# Patient Record
Sex: FEMALE | Race: WHITE | Employment: OTHER | URBAN - METROPOLITAN AREA
[De-identification: names, ages, dates, MRNs, and addresses within clinical notes are randomized per-mention and may not be internally consistent; named-entity substitution may affect disease eponyms.]

---

## 2017-01-03 ENCOUNTER — OFFICE VISIT (OUTPATIENT)
Dept: PULMONOLOGY | Age: 52
End: 2017-01-03

## 2017-01-03 VITALS
BODY MASS INDEX: 24.13 KG/M2 | DIASTOLIC BLOOD PRESSURE: 71 MMHG | HEART RATE: 86 BPM | WEIGHT: 145 LBS | SYSTOLIC BLOOD PRESSURE: 106 MMHG

## 2017-01-03 DIAGNOSIS — J38.3 VOCAL CORD DYSFUNCTION: ICD-10-CM

## 2017-01-03 DIAGNOSIS — R05.3 CHRONIC COUGH: Primary | ICD-10-CM

## 2017-01-03 DIAGNOSIS — J45.40 MODERATE PERSISTENT ASTHMA WITHOUT COMPLICATION: ICD-10-CM

## 2017-01-03 DIAGNOSIS — J18.9 CAP (COMMUNITY ACQUIRED PNEUMONIA): ICD-10-CM

## 2017-01-03 DIAGNOSIS — J45.909 RAD (REACTIVE AIRWAY DISEASE), UNSPECIFIED ASTHMA SEVERITY, UNCOMPLICATED: ICD-10-CM

## 2017-01-03 PROCEDURE — 99214 OFFICE O/P EST MOD 30 MIN: CPT | Performed by: INTERNAL MEDICINE

## 2017-01-05 RX ORDER — IPRATROPIUM BROMIDE AND ALBUTEROL SULFATE 2.5; .5 MG/3ML; MG/3ML
SOLUTION RESPIRATORY (INHALATION)
Qty: 90 ML | Refills: 1 | OUTPATIENT
Start: 2017-01-05

## 2017-01-17 DIAGNOSIS — J45.909 RAD (REACTIVE AIRWAY DISEASE), UNSPECIFIED ASTHMA SEVERITY, UNCOMPLICATED: ICD-10-CM

## 2017-01-17 RX ORDER — LEVALBUTEROL INHALATION SOLUTION 1.25 MG/3ML
SOLUTION RESPIRATORY (INHALATION)
Qty: 288 ML | Refills: 0 | OUTPATIENT
Start: 2017-01-17

## 2017-01-17 RX ORDER — IPRATROPIUM BROMIDE AND ALBUTEROL SULFATE 2.5; .5 MG/3ML; MG/3ML
1 SOLUTION RESPIRATORY (INHALATION) EVERY 6 HOURS PRN
Qty: 360 ML | Refills: 11 | Status: SHIPPED | OUTPATIENT
Start: 2017-01-17 | End: 2018-02-21 | Stop reason: SDUPTHER

## 2017-01-18 ENCOUNTER — TELEPHONE (OUTPATIENT)
Dept: PULMONOLOGY | Age: 52
End: 2017-01-18

## 2017-02-01 ENCOUNTER — TELEPHONE (OUTPATIENT)
Dept: PULMONOLOGY | Age: 52
End: 2017-02-01

## 2017-02-13 ENCOUNTER — TELEPHONE (OUTPATIENT)
Dept: PULMONOLOGY | Age: 52
End: 2017-02-13

## 2017-03-23 ENCOUNTER — HOSPITAL ENCOUNTER (OUTPATIENT)
Dept: OTHER | Age: 52
Discharge: HOME | End: 2017-03-23
Attending: NURSE PRACTITIONER | Admitting: NURSE PRACTITIONER

## 2017-03-23 ENCOUNTER — OFFICE VISIT (OUTPATIENT)
Dept: PULMONOLOGY | Age: 52
End: 2017-03-23

## 2017-03-23 VITALS
RESPIRATION RATE: 20 BRPM | SYSTOLIC BLOOD PRESSURE: 123 MMHG | DIASTOLIC BLOOD PRESSURE: 80 MMHG | WEIGHT: 148.8 LBS | BODY MASS INDEX: 24.76 KG/M2 | TEMPERATURE: 98.2 F | OXYGEN SATURATION: 97 % | HEART RATE: 67 BPM

## 2017-03-23 DIAGNOSIS — J18.9 CAP (COMMUNITY ACQUIRED PNEUMONIA): ICD-10-CM

## 2017-03-23 DIAGNOSIS — R05.3 CHRONIC COUGH: ICD-10-CM

## 2017-03-23 DIAGNOSIS — J45.40 MODERATE PERSISTENT ASTHMA WITHOUT COMPLICATION: ICD-10-CM

## 2017-03-23 DIAGNOSIS — J38.3 VOCAL CORD DYSFUNCTION: ICD-10-CM

## 2017-03-23 DIAGNOSIS — R05.3 CHRONIC COUGH: Primary | ICD-10-CM

## 2017-03-23 LAB
ANION GAP SERPL CALCULATED.3IONS-SCNC: 14 MMOL/L (ref 3–16)
BASOPHILS ABSOLUTE: 0.1 K/UL (ref 0–0.2)
BASOPHILS RELATIVE PERCENT: 0.8 %
BUN BLDV-MCNC: 12 MG/DL (ref 7–20)
CALCIUM SERPL-MCNC: 9.2 MG/DL (ref 8.3–10.6)
CHLORIDE BLD-SCNC: 102 MMOL/L (ref 99–110)
CO2: 24 MMOL/L (ref 21–32)
CREAT SERPL-MCNC: 0.6 MG/DL (ref 0.6–1.1)
EOSINOPHILS ABSOLUTE: 0.5 K/UL (ref 0–0.6)
EOSINOPHILS RELATIVE PERCENT: 6.1 %
GFR AFRICAN AMERICAN: >60
GFR NON-AFRICAN AMERICAN: >60
GLUCOSE BLD-MCNC: 96 MG/DL (ref 70–99)
HCT VFR BLD CALC: 45.7 % (ref 36–48)
HEMOGLOBIN: 15.2 G/DL (ref 12–16)
LYMPHOCYTES ABSOLUTE: 1.9 K/UL (ref 1–5.1)
LYMPHOCYTES RELATIVE PERCENT: 25.7 %
MCH RBC QN AUTO: 29.8 PG (ref 26–34)
MCHC RBC AUTO-ENTMCNC: 33.2 G/DL (ref 31–36)
MCV RBC AUTO: 89.6 FL (ref 80–100)
MONOCYTES ABSOLUTE: 0.5 K/UL (ref 0–1.3)
MONOCYTES RELATIVE PERCENT: 6.8 %
NEUTROPHILS ABSOLUTE: 4.6 K/UL (ref 1.7–7.7)
NEUTROPHILS RELATIVE PERCENT: 60.6 %
PDW BLD-RTO: 14.2 % (ref 12.4–15.4)
PLATELET # BLD: 184 K/UL (ref 135–450)
PMV BLD AUTO: 9.6 FL (ref 5–10.5)
POTASSIUM SERPL-SCNC: 4.3 MMOL/L (ref 3.5–5.1)
RBC # BLD: 5.1 M/UL (ref 4–5.2)
SODIUM BLD-SCNC: 140 MMOL/L (ref 136–145)
WBC # BLD: 7.6 K/UL (ref 4–11)

## 2017-03-23 PROCEDURE — 99214 OFFICE O/P EST MOD 30 MIN: CPT | Performed by: NURSE PRACTITIONER

## 2017-03-23 RX ORDER — DOXYCYCLINE HYCLATE 100 MG
100 TABLET ORAL 2 TIMES DAILY
Qty: 20 TABLET | Refills: 0 | Status: SHIPPED | OUTPATIENT
Start: 2017-03-23 | End: 2017-04-02

## 2017-06-08 ENCOUNTER — TELEPHONE (OUTPATIENT)
Dept: PULMONOLOGY | Age: 52
End: 2017-06-08

## 2017-06-08 ENCOUNTER — OFFICE VISIT (OUTPATIENT)
Dept: PULMONOLOGY | Age: 52
End: 2017-06-08

## 2017-06-08 VITALS
TEMPERATURE: 99 F | DIASTOLIC BLOOD PRESSURE: 85 MMHG | SYSTOLIC BLOOD PRESSURE: 135 MMHG | WEIGHT: 148.4 LBS | BODY MASS INDEX: 24.7 KG/M2 | HEART RATE: 110 BPM | OXYGEN SATURATION: 93 % | RESPIRATION RATE: 22 BRPM

## 2017-06-08 DIAGNOSIS — J44.1 ACUTE EXACERBATION OF COPD WITH ASTHMA (HCC): ICD-10-CM

## 2017-06-08 DIAGNOSIS — J45.901 ACUTE EXACERBATION OF COPD WITH ASTHMA (HCC): ICD-10-CM

## 2017-06-08 DIAGNOSIS — J96.01 ACUTE RESPIRATORY FAILURE WITH HYPOXIA (HCC): Primary | ICD-10-CM

## 2017-06-08 PROCEDURE — 99214 OFFICE O/P EST MOD 30 MIN: CPT | Performed by: NURSE PRACTITIONER

## 2017-06-08 RX ORDER — DOXYCYCLINE HYCLATE 100 MG/1
100 CAPSULE ORAL 2 TIMES DAILY
Qty: 20 CAPSULE | Refills: 0 | Status: SHIPPED | OUTPATIENT
Start: 2017-06-08 | End: 2017-06-18

## 2017-08-16 ENCOUNTER — TELEPHONE (OUTPATIENT)
Dept: PULMONOLOGY | Age: 52
End: 2017-08-16

## 2017-10-24 RX ORDER — ALBUTEROL SULFATE 90 MCG
HFA AEROSOL WITH ADAPTER (GRAM) INHALATION
Qty: 13.4 INHALER | Refills: 3 | OUTPATIENT
Start: 2017-10-24

## 2018-02-21 DIAGNOSIS — J45.909 RAD (REACTIVE AIRWAY DISEASE), UNSPECIFIED ASTHMA SEVERITY, UNCOMPLICATED: ICD-10-CM

## 2018-02-22 RX ORDER — IPRATROPIUM BROMIDE AND ALBUTEROL SULFATE 2.5; .5 MG/3ML; MG/3ML
SOLUTION RESPIRATORY (INHALATION)
Qty: 360 ML | Refills: 0 | Status: SHIPPED | OUTPATIENT
Start: 2018-02-22 | End: 2018-02-26 | Stop reason: ALTCHOICE

## 2018-02-23 ENCOUNTER — TELEPHONE (OUTPATIENT)
Dept: PULMONOLOGY | Age: 53
End: 2018-02-23

## 2018-02-23 NOTE — TELEPHONE ENCOUNTER
TIMUR for pt can see her Monday at 1:15 and that I went ahead and scheduled her. Asked for her to call us back on Monday morning if that is not going to work otherwise she is all set.

## 2018-02-26 ENCOUNTER — OFFICE VISIT (OUTPATIENT)
Dept: PULMONOLOGY | Age: 53
End: 2018-02-26

## 2018-02-26 VITALS
DIASTOLIC BLOOD PRESSURE: 72 MMHG | SYSTOLIC BLOOD PRESSURE: 116 MMHG | HEART RATE: 68 BPM | WEIGHT: 153 LBS | BODY MASS INDEX: 25.46 KG/M2

## 2018-02-26 DIAGNOSIS — J45.909 RAD (REACTIVE AIRWAY DISEASE), UNSPECIFIED ASTHMA SEVERITY, UNCOMPLICATED: ICD-10-CM

## 2018-02-26 PROCEDURE — 99213 OFFICE O/P EST LOW 20 MIN: CPT | Performed by: INTERNAL MEDICINE

## 2018-02-26 RX ORDER — LEVALBUTEROL INHALATION SOLUTION 1.25 MG/3ML
1.25 SOLUTION RESPIRATORY (INHALATION) 3 TIMES DAILY
Qty: 100 ML | Refills: 11 | Status: SHIPPED | OUTPATIENT
Start: 2018-02-26 | End: 2019-05-08

## 2018-02-26 RX ORDER — ALBUTEROL SULFATE 90 UG/1
AEROSOL, METERED RESPIRATORY (INHALATION)
Qty: 6.7 INHALER | Refills: 11 | Status: SHIPPED | OUTPATIENT
Start: 2018-02-26 | End: 2018-07-25 | Stop reason: SDUPTHER

## 2018-02-26 NOTE — PROGRESS NOTES
 Sulfacetamide Sodium Hives     Breathing problems     Prior to Visit Medications    Medication Sig Taking? Authorizing Provider   ipratropium (ATROVENT) 0.02 % nebulizer solution INHALE 1 AMPULE BY NEBULIZER EVERY 6 HOURS AS NEEDED FOR COUGH  C Naya Augustine MD   ZOLMitriptan (ZOMIG) 5 MG tablet Take 1 tablet by mouth as needed for Migraine  Vilma Blood MD   albuterol sulfate HFA (PROVENTIL HFA) 108 (90 BASE) MCG/ACT inhaler TAKE 2 PUFFS BY INHALATION EVERY 4-6 HOURS AS NEEDED FOR SHORTHNESS OF BREATH OR WHEEZING  [place]   acetaminophen (TYLENOL) 325 MG tablet Take 650 mg by mouth as needed  Historical Provider, MD   Fexofenadine-Pseudoephedrine (ALLEGRA-D 12 HOUR PO) Take  by mouth. Historical Provider, MD       Vitals:    02/26/18 1406   BP: 116/72   Pulse: 68   Weight: 153 lb (69.4 kg)     Body mass index is 25.46 kg/m².      Wt Readings from Last 3 Encounters:   02/26/18 153 lb (69.4 kg)   06/08/17 148 lb 6.4 oz (67.3 kg)   03/23/17 148 lb 12.8 oz (67.5 kg)     BP Readings from Last 3 Encounters:   02/26/18 116/72   06/08/17 135/85   03/23/17 123/80        History   Smoking Status    Former Smoker    Packs/day: 0.50    Years: 11.00    Quit date: 1/1/2000   Smokeless Tobacco    Never Used

## 2018-03-08 ENCOUNTER — OFFICE VISIT (OUTPATIENT)
Dept: PULMONOLOGY | Age: 53
End: 2018-03-08

## 2018-03-08 VITALS
OXYGEN SATURATION: 96 % | DIASTOLIC BLOOD PRESSURE: 69 MMHG | SYSTOLIC BLOOD PRESSURE: 120 MMHG | RESPIRATION RATE: 14 BRPM | WEIGHT: 157 LBS | HEART RATE: 62 BPM | BODY MASS INDEX: 26.13 KG/M2

## 2018-03-08 DIAGNOSIS — R05.9 COUGH: ICD-10-CM

## 2018-03-08 DIAGNOSIS — J45.41 MODERATE PERSISTENT ASTHMA WITH ACUTE EXACERBATION: Primary | ICD-10-CM

## 2018-03-08 DIAGNOSIS — T17.908A ASPIRATION INTO RESPIRATORY TRACT, INITIAL ENCOUNTER: ICD-10-CM

## 2018-03-08 PROCEDURE — 99214 OFFICE O/P EST MOD 30 MIN: CPT | Performed by: NURSE PRACTITIONER

## 2018-03-08 RX ORDER — DOXYCYCLINE HYCLATE 100 MG
100 TABLET ORAL 2 TIMES DAILY
Qty: 20 TABLET | Refills: 0 | Status: SHIPPED | OUTPATIENT
Start: 2018-03-08 | End: 2018-03-18

## 2018-03-08 NOTE — PROGRESS NOTES
INHALATION EVERY 4-6 HOURS AS NEEDED FOR SHORTHNESS OF BREATH OR WHEEZING 6.7 Inhaler 11    ZOLMitriptan (ZOMIG) 5 MG tablet Take 1 tablet by mouth as needed for Migraine 12 tablet 1    acetaminophen (TYLENOL) 325 MG tablet Take 650 mg by mouth as needed      Fexofenadine-Pseudoephedrine (ALLEGRA-D 12 HOUR PO) Take  by mouth. No current facility-administered medications on file prior to visit. Review of Systems   Constitutional: Positive for fatigue. Negative for chills and fever (but sweating some at night). HENT: Positive for sore throat and trouble swallowing. Negative for congestion and postnasal drip. Respiratory: Positive for cough, chest tightness, shortness of breath and wheezing. Cardiovascular: Negative for chest pain and leg swelling. Gastrointestinal: Negative for abdominal pain, constipation, diarrhea, nausea and vomiting. Genitourinary: Negative for difficulty urinating and dysuria. Musculoskeletal: Negative for arthralgias and myalgias. Skin: Negative for color change and pallor. Psychiatric/Behavioral: Negative for agitation and confusion. Objective:       VITALS:  /69   Pulse 62   Resp 14   Wt 157 lb (71.2 kg)   SpO2 96%   BMI 26.13 kg/m²  on RA    Physical Exam   Constitutional: She is oriented to person, place, and time. She appears well-developed and well-nourished. No distress. HENT:   Head: Normocephalic. Mouth/Throat: No oropharyngeal exudate. Eyes: Conjunctivae are normal. Right eye exhibits no discharge. Left eye exhibits no discharge. Neck: Normal range of motion. Neck supple. No tracheal deviation present. Cardiovascular: Normal rate and regular rhythm. Pulmonary/Chest: Effort normal. No accessory muscle usage or stridor. No tachypnea. No respiratory distress. She has wheezes. She exhibits no tenderness and no crepitus. Abdominal: Soft. Bowel sounds are normal. She exhibits no distension. There is no tenderness. Musculoskeletal: Normal range of motion. She exhibits no edema. Lymphadenopathy:     She has no cervical adenopathy. Neurological: She is alert and oriented to person, place, and time. Skin: Skin is warm and dry. No erythema. Psychiatric: She has a normal mood and affect. Thought content normal.   Vitals reviewed. DATA:      Radiology Review:  Pertinent images / reports were reviewed as a part of this visit. CT chest done 12/24/16 reveals the following:  No evidence of a pulmonary embolus. Diffuse bronchial wall thickening with scattered mucous plugs particularly in the left lower lobe. Bilateral patchy ground-glass opacity most pronounced in the lower lobes. Ground-glass opacity may represent atelectasis related to the mucus plugging and/or pneumonia. There is mild confluent left lower lobe airspace disease. Last PFTs done Oct 2016:  1. Spirometry revealed evidence of severe obstructive defect. FEV1 is 1.24  L, which is 43% of predicted. There is significant response to  bronchodilators in FEV1 and FVC. FEV1/FVC ratio of 52%. 2.  Lung volume revealed normal total lung capacity 6.01 L, which is 110% of  predicted. Evidence for air-trapping with residual volume of 3.26 L, which is  181% of predicted. 3.  Diffusion capacity is mildly decreased at 19.62, which is 79% of  predicted. 4.  Flow volume loops consistent with obstructive defect. CONCLUSION:  Severe obstructive defect with bronchodilator response,  air-trapping and mildly decreased diffusion capacity. Assessment / Plan:   1. Cough  - Acute on chronic issue for her  - She has historically not tolerated ICS or Prednisone    2.  Aspiration into respiratory tract, initial encounter  - She feels she aspirated water and has had issues with choking when drinking eating  - She refuses imaging  - Add Doxy as she reports this has helped when cough / aspiration has been a problem in the past  - She should f/u with GI for further evaluation    3.  Moderate persistent asthma with acute exacerbation  - Continue Xopenex / Atrovent as this is the only medication she can tolerate    RTC 4 weeks if symptoms persist.    Fouzia Davis MSN APRN-ACNP CCRN

## 2018-03-09 ASSESSMENT — ENCOUNTER SYMPTOMS
SORE THROAT: 1
ABDOMINAL PAIN: 0
COLOR CHANGE: 0
TROUBLE SWALLOWING: 1
WHEEZING: 1
DIARRHEA: 0
CHEST TIGHTNESS: 1
SHORTNESS OF BREATH: 1
VOMITING: 0
CONSTIPATION: 0
NAUSEA: 0
COUGH: 1

## 2018-04-02 ENCOUNTER — TELEPHONE (OUTPATIENT)
Dept: PULMONOLOGY | Age: 53
End: 2018-04-02

## 2018-07-25 ENCOUNTER — TELEPHONE (OUTPATIENT)
Dept: PULMONOLOGY | Age: 53
End: 2018-07-25

## 2018-07-25 RX ORDER — ALBUTEROL SULFATE 90 UG/1
AEROSOL, METERED RESPIRATORY (INHALATION)
Qty: 2 INHALER | Refills: 4 | Status: SHIPPED | OUTPATIENT
Start: 2018-07-25 | End: 2019-02-19 | Stop reason: SDUPTHER

## 2018-09-20 ENCOUNTER — TELEPHONE (OUTPATIENT)
Dept: PULMONOLOGY | Age: 53
End: 2018-09-20

## 2018-09-20 NOTE — TELEPHONE ENCOUNTER
REFILLS:     Type of Medication: ProVentil     Dosage: 108 mcg    How are you taking: inhaler    Pharmacy and Location: [facility]. Pharmacy phone number: [phone]    How many doses do you have left: 10 dose    When was the last appointment: 7/25/18    Patient said that she normally receive 2 inhaler a month but this time she only receive enough to cover two months and she's now out of refills.

## 2018-10-09 ENCOUNTER — TELEPHONE (OUTPATIENT)
Dept: PULMONOLOGY | Age: 53
End: 2018-10-09

## 2018-11-12 ENCOUNTER — TELEPHONE (OUTPATIENT)
Dept: PULMONOLOGY | Age: 53
End: 2018-11-12

## 2018-12-18 ENCOUNTER — TELEPHONE (OUTPATIENT)
Dept: PULMONOLOGY | Age: 53
End: 2018-12-18

## 2019-02-19 ENCOUNTER — OFFICE VISIT (OUTPATIENT)
Dept: PULMONOLOGY | Age: 54
End: 2019-02-19
Payer: COMMERCIAL

## 2019-02-19 VITALS
DIASTOLIC BLOOD PRESSURE: 72 MMHG | SYSTOLIC BLOOD PRESSURE: 116 MMHG | BODY MASS INDEX: 26.29 KG/M2 | HEART RATE: 66 BPM | WEIGHT: 158 LBS

## 2019-02-19 DIAGNOSIS — J45.40 MODERATE PERSISTENT ASTHMA WITHOUT COMPLICATION: ICD-10-CM

## 2019-02-19 DIAGNOSIS — J38.3 VOCAL CORD DYSFUNCTION: ICD-10-CM

## 2019-02-19 DIAGNOSIS — R05.3 CHRONIC COUGH: Primary | ICD-10-CM

## 2019-02-19 PROCEDURE — G8427 DOCREV CUR MEDS BY ELIG CLIN: HCPCS | Performed by: INTERNAL MEDICINE

## 2019-02-19 PROCEDURE — 99213 OFFICE O/P EST LOW 20 MIN: CPT | Performed by: INTERNAL MEDICINE

## 2019-02-19 PROCEDURE — G8484 FLU IMMUNIZE NO ADMIN: HCPCS | Performed by: INTERNAL MEDICINE

## 2019-02-19 PROCEDURE — 1036F TOBACCO NON-USER: CPT | Performed by: INTERNAL MEDICINE

## 2019-02-19 PROCEDURE — G8419 CALC BMI OUT NRM PARAM NOF/U: HCPCS | Performed by: INTERNAL MEDICINE

## 2019-02-19 PROCEDURE — 3017F COLORECTAL CA SCREEN DOC REV: CPT | Performed by: INTERNAL MEDICINE

## 2019-02-19 RX ORDER — FORMOTEROL FUMARATE 20 UG/2ML
20 SOLUTION RESPIRATORY (INHALATION) 2 TIMES DAILY
Qty: 360 ML | Refills: 11 | Status: SHIPPED | OUTPATIENT
Start: 2019-02-19 | End: 2019-05-08

## 2019-02-19 RX ORDER — ALBUTEROL SULFATE 90 UG/1
AEROSOL, METERED RESPIRATORY (INHALATION)
Qty: 2 INHALER | Refills: 4 | Status: SHIPPED | OUTPATIENT
Start: 2019-02-19 | End: 2019-09-13 | Stop reason: SDUPTHER

## 2019-04-11 ENCOUNTER — TELEPHONE (OUTPATIENT)
Dept: PULMONOLOGY | Age: 54
End: 2019-04-11

## 2019-04-11 RX ORDER — DOXYCYCLINE HYCLATE 100 MG/1
100 CAPSULE ORAL 2 TIMES DAILY
Qty: 14 CAPSULE | Refills: 0 | OUTPATIENT
Start: 2019-04-11 | End: 2019-04-18

## 2019-04-11 NOTE — TELEPHONE ENCOUNTER
Spoke to patient and informed of notes, patient would like to know if we can also prescribe diflucan for yeast infection which she gets after taking antibiotics?

## 2019-04-11 NOTE — TELEPHONE ENCOUNTER
Patient called in stated she is confused who she needs to call Dr. Wilfredo De La Rosa or her ENT. She stated her pcp suggested for her to call us. For the past 2 weeks patient has been having cough and sore throat, she was tested for strep which came back negative. Patient is coughing less now, but is still coughing up clear phlegm, but has drainage from her nose which is yellow. Patient did have low grade fever on Tuesday. She has been using her nebulizer and rescue inhaler more because she feels she is wheezing.   Patient would like to know what she can do?  [phone]

## 2019-04-11 NOTE — TELEPHONE ENCOUNTER
I normally don't do that. There is more of a risk if steroids are used. She can call in if she has any symptoms that come up.

## 2019-04-11 NOTE — TELEPHONE ENCOUNTER
Prescribe a short course of doxycyline 100mg bid x 7 days for URI. I see that she is intolerant to prednisone. Continue with inhalers and nebulizers. May need to be seen if her symptoms get worse.

## 2019-05-07 ENCOUNTER — TELEPHONE (OUTPATIENT)
Dept: PULMONOLOGY | Age: 54
End: 2019-05-07

## 2019-05-07 NOTE — TELEPHONE ENCOUNTER
Went to [facility] told sinus infection and going into Bronchitis Given Azithromycin 500 mg a day x 3 days and she is starting to feel better but still has a tight scratchy feeling in her lower lungs. O2 sat 91% at [facility]. Should she see Juan Jose Antony for a follow up? Using nebulizer q4h Fever broke on Sunday. Phlegm starts out dark green in morning s and then goes to clear by midday.

## 2019-05-07 NOTE — TELEPHONE ENCOUNTER
Pt called in requesting a call back from Scott. Pt stated that she is currently in the middle of taking antibiotics, she has bronchitis and sinus infection. Needing some advice.      Pt # [phone]

## 2019-05-08 ENCOUNTER — OFFICE VISIT (OUTPATIENT)
Dept: PULMONOLOGY | Age: 54
End: 2019-05-08
Payer: COMMERCIAL

## 2019-05-08 VITALS
SYSTOLIC BLOOD PRESSURE: 116 MMHG | DIASTOLIC BLOOD PRESSURE: 68 MMHG | BODY MASS INDEX: 25.79 KG/M2 | OXYGEN SATURATION: 95 % | WEIGHT: 155 LBS | HEART RATE: 63 BPM

## 2019-05-08 DIAGNOSIS — J20.9 ACUTE BRONCHITIS, UNSPECIFIED ORGANISM: Primary | ICD-10-CM

## 2019-05-08 DIAGNOSIS — J45.40 MODERATE PERSISTENT ASTHMA WITHOUT COMPLICATION: ICD-10-CM

## 2019-05-08 DIAGNOSIS — J38.3 VOCAL CORD DYSFUNCTION: ICD-10-CM

## 2019-05-08 PROCEDURE — 99214 OFFICE O/P EST MOD 30 MIN: CPT | Performed by: NURSE PRACTITIONER

## 2019-05-08 PROCEDURE — 3017F COLORECTAL CA SCREEN DOC REV: CPT | Performed by: NURSE PRACTITIONER

## 2019-05-08 PROCEDURE — 1036F TOBACCO NON-USER: CPT | Performed by: NURSE PRACTITIONER

## 2019-05-08 PROCEDURE — G8419 CALC BMI OUT NRM PARAM NOF/U: HCPCS | Performed by: NURSE PRACTITIONER

## 2019-05-08 PROCEDURE — G8427 DOCREV CUR MEDS BY ELIG CLIN: HCPCS | Performed by: NURSE PRACTITIONER

## 2019-05-08 RX ORDER — AZITHROMYCIN 250 MG/1
TABLET, FILM COATED ORAL
Qty: 1 PACKET | Refills: 0 | Status: SHIPPED | OUTPATIENT
Start: 2019-05-08 | End: 2019-05-18

## 2019-05-08 ASSESSMENT — ENCOUNTER SYMPTOMS
ABDOMINAL PAIN: 0
COLOR CHANGE: 0
CONSTIPATION: 0
SHORTNESS OF BREATH: 1
COUGH: 1

## 2019-05-08 NOTE — PROGRESS NOTES
[facility]    Subjective:   CHIEF COMPLAINT / HPI: Asthma, pleuritic pain, recent flare / bronchitis / sinusitis    The patient is 46 y.o. female who presents today for a routine follow up visit related to the above mentioned issues. There is a PMH of asthma / chronic cough, vocal cord dysfunction and previous sinus surgery. She was recently treated for presumed bronchitis / sinusitis at the [facility] Zithromax. Prior to that we prescribed her Doxy x 7 days on . Presently she is improved since she was seen at the [facility] but is still not back to baseline. She is frustrated because she has been slow to improve and she cannot tolerate many medications. She continues to cough up mucous. Sometimes this is clear, other times is green or brown. She denies fevers and chills. She feels like the Doxycyline has made issues with her vocal cords worse though she does seem to feel better on antibiotics. She feels her chest is tight but her head congestion is much better. She is only taking Proventil and regular Allergra. She hasn't tolerated other inhaled medications or Prednisone.          Past Medical History:   Diagnosis Date    Asthma     Migraines     Vocal cord dysfunction      Social History:    Social History     Tobacco Use   Smoking Status Former Smoker    Packs/day: 0.50    Years: 11.00    Pack years: 5.50    Last attempt to quit: 2000    Years since quittin.3   Smokeless Tobacco Never Used     Current Medications:     Current Outpatient Medications on File Prior to Visit   Medication Sig Dispense Refill    albuterol sulfate HFA (PROVENTIL HFA) 108 (90 Base) MCG/ACT inhaler TAKE 2 PUFFS BY INHALATION EVERY 4-6 HOURS AS NEEDED FOR SHORTHNESS OF BREATH OR WHEEZING 2 Inhaler 4    ZOLMitriptan (ZOMIG) 5 MG tablet Take 1 tablet by mouth as needed for Migraine 12 tablet 1    acetaminophen (TYLENOL) 325 MG tablet Take 650 mg by mouth as needed      Fexofenadine-Pseudoephedrine (ALLEGRA-D 12 HOUR PO) Take  by mouth. No current facility-administered medications on file prior to visit. Review of Systems   Constitutional: Negative for chills and fever. HENT: Negative for congestion and postnasal drip. Respiratory: Positive for cough and shortness of breath. Cardiovascular: Negative for chest pain and leg swelling. Gastrointestinal: Negative for abdominal pain and constipation. Musculoskeletal: Negative for arthralgias and joint swelling. Skin: Negative for color change and pallor. Allergic/Immunologic: Negative for environmental allergies and food allergies. Psychiatric/Behavioral: Negative for agitation and confusion. Objective:       VITALS:  /68   Pulse 63   Wt 155 lb (70.3 kg)   SpO2 95%   BMI 25.79 kg/m²  on RA    Physical Exam   Constitutional: She is oriented to person, place, and time. She appears well-developed and well-nourished. No distress. HENT:   Head: Normocephalic. Mouth/Throat: No oropharyngeal exudate. Eyes: Pupils are equal, round, and reactive to light. Conjunctivae are normal. Right eye exhibits no discharge. Left eye exhibits no discharge. Neck: Normal range of motion. Neck supple. No tracheal deviation present. Cardiovascular: Normal rate, regular rhythm and intact distal pulses. Exam reveals no friction rub. Pulmonary/Chest: Effort normal. No accessory muscle usage or stridor. No tachypnea. No respiratory distress. She has wheezes. She has no rales. She exhibits no tenderness and no crepitus. Abdominal: Soft. Bowel sounds are normal. She exhibits no distension. There is no tenderness. Musculoskeletal: Normal range of motion. She exhibits no edema. Lymphadenopathy:     She has no cervical adenopathy. Neurological: She is alert and oriented to person, place, and time. Skin: Skin is warm and dry. No erythema. Psychiatric: She has a normal mood and affect.  Thought content normal.   Vitals reviewed. DATA:      Radiology Review:  Pertinent images / reports were reviewed as a part of this visit. CT chest done 2016 reveals the following:  No evidence of a pulmonary embolus. Diffuse bronchial wall thickening with scattered mucous plugs particularly in the left lower lobe. Bilateral patchy ground-glass opacity most pronounced in the lower lobes. Ground-glass opacity may represent atelectasis related to the mucus plugging and/or pneumonia. There is mild confluent left lower lobe airspace disease. Last PFTs done 2016:  1. Spirometry revealed evidence of severe obstructive defect. FEV1 is 1.24  L, which is 43% of predicted. There is significant response to  bronchodilators in FEV1 and FVC. FEV1/FVC ratio of 52%. 2.  Lung volume revealed normal total lung capacity 6.01 L, which is 110% of  predicted. Evidence for air-trapping with residual volume of 3.26 L, which is  181% of predicted. 3.  Diffusion capacity is mildly decreased at 19.62, which is 79% of  predicted. 4.  Flow volume loops consistent with obstructive defect. CONCLUSION:  Severe obstructive defect with bronchodilator response,  air-trapping and mildly decreased diffusion capacity. Assessment / Plan:   1. Moderate persistent asthma without complication  - She has struggled this spring to manage symptoms  - She does not tolerate inhaled medications apart from Albuterol HFA  - She is wheezing on exam but does not tolerate Prednisone    2.  Acute bronchitis, unspecified organism  - She has had a few rounds of antibiotics and is improved but worsens again when she is off of them  - We discussed chest imaging + culture, she would like to proceed with just culture  - Sputum culture  - We also discussed a longer course of abx but she has reservations because she can only tolerate a couple antibiotics and is afraid of resistance which is a reasonable concern  - Add azithromycin (ZITHROMAX Z-MARITZA) 250 MG tablet; Take 2 tabs on day 1, then take 1 tab daily until complete  Dispense: 1 packet; Refill: 0  - Could consider longer course if needed, await culture data    3. Vocal cord dysfunction  - She feels Doxy has made her vocal cord issues worse and plans to see her ENT next week     Return in about 2 weeks (around 5/22/2019) for short term follow up of symptoms. RTC sooner if symptoms worsen acutely.     Carlin Salcido MSN APRN-ACNP CCRN

## 2019-06-14 ENCOUNTER — TELEPHONE (OUTPATIENT)
Dept: PULMONOLOGY | Age: 54
End: 2019-06-14

## 2019-06-24 ENCOUNTER — TELEPHONE (OUTPATIENT)
Dept: PULMONOLOGY | Age: 54
End: 2019-06-24

## 2019-09-13 RX ORDER — ALBUTEROL SULFATE 90 MCG
HFA AEROSOL WITH ADAPTER (GRAM) INHALATION
Qty: 2 INHALER | Refills: 3 | Status: SHIPPED | OUTPATIENT
Start: 2019-09-13 | End: 2020-02-05 | Stop reason: SDUPTHER

## 2020-02-05 RX ORDER — ALBUTEROL SULFATE 90 MCG
HFA AEROSOL WITH ADAPTER (GRAM) INHALATION
Qty: 2 INHALER | Refills: 0 | Status: SHIPPED | OUTPATIENT
Start: 2020-02-05 | End: 2021-03-08 | Stop reason: SINTOL

## 2020-02-20 ENCOUNTER — TELEPHONE (OUTPATIENT)
Dept: PULMONOLOGY | Age: 55
End: 2020-02-20

## 2020-02-20 RX ORDER — AZITHROMYCIN 250 MG/1
250 TABLET, FILM COATED ORAL SEE ADMIN INSTRUCTIONS
Qty: 6 PACKET | Refills: 0 | OUTPATIENT
Start: 2020-02-20 | End: 2020-02-25

## 2020-03-06 ENCOUNTER — OFFICE VISIT (OUTPATIENT)
Dept: PULMONOLOGY | Age: 55
End: 2020-03-06
Payer: COMMERCIAL

## 2020-03-06 VITALS — SYSTOLIC BLOOD PRESSURE: 119 MMHG | OXYGEN SATURATION: 97 % | DIASTOLIC BLOOD PRESSURE: 67 MMHG | HEART RATE: 74 BPM

## 2020-03-06 PROCEDURE — 99213 OFFICE O/P EST LOW 20 MIN: CPT | Performed by: INTERNAL MEDICINE

## 2020-03-06 PROCEDURE — 1036F TOBACCO NON-USER: CPT | Performed by: INTERNAL MEDICINE

## 2020-03-06 PROCEDURE — G8419 CALC BMI OUT NRM PARAM NOF/U: HCPCS | Performed by: INTERNAL MEDICINE

## 2020-03-06 PROCEDURE — 3017F COLORECTAL CA SCREEN DOC REV: CPT | Performed by: INTERNAL MEDICINE

## 2020-03-06 PROCEDURE — G8427 DOCREV CUR MEDS BY ELIG CLIN: HCPCS | Performed by: INTERNAL MEDICINE

## 2020-03-06 PROCEDURE — G8484 FLU IMMUNIZE NO ADMIN: HCPCS | Performed by: INTERNAL MEDICINE

## 2020-03-06 RX ORDER — ARFORMOTEROL TARTRATE 15 UG/2ML
1 SOLUTION RESPIRATORY (INHALATION) 2 TIMES DAILY
Qty: 120 ML | Refills: 3 | Status: SHIPPED | OUTPATIENT
Start: 2020-03-06 | End: 2021-03-08

## 2020-03-06 RX ORDER — LEVALBUTEROL TARTRATE 45 UG/1
2 AEROSOL, METERED ORAL EVERY 6 HOURS PRN
Qty: 1 INHALER | Refills: 5 | Status: SHIPPED | OUTPATIENT
Start: 2020-03-06 | End: 2020-05-28 | Stop reason: SDUPTHER

## 2020-03-06 NOTE — PROGRESS NOTES
[facility]  Progress Note  Soheila Nascimento MD       [facility]   YOB: 1965    Date of Visit:  3/6/2020    Assessment/Plan:  1. Chronic cough  She is at baseline    2. Moderate persistent asthma without complication  Continue nebulizer treatment  This has worked the best for her. I think she would benefit from nebulized Brovana or Perforomist. She is scared of this because Celso Mink \"sent me to the ER. \"  She is mostly afraid of the steroid  Will give her Brovana to try, certainly would be better with steroid but she is not willing to try that. 3. Vocal cord dysfunction  Stable but does continue to have symptoms    FOLLOW UP: 12 months      Chief Complaint   Patient presents with    Cough     having good and bad days but nebulizer helps Still has \"attacks\" everday       HPI  The patient presents with a chief complaint of moderate shortness of breath related to mild asthma of many years duration. He has mild associated cough. Exertion is a modifying factor. She has good days and bad days but overall pretty stable. She continues to take Atrovent/Xopenex in the [facility]. Had prescribed Brovana and Pulmicort but she was scared of it because of a reaction she had to Celso Mink. She is not having fevers or chills. Review of Systems  As documented in HPI     Physical Exam:  Well developed, well nourished  Alert and oriented  Sclera is clear  No cervical adenopathy  No JVD. Chest examination is few inspiratory squeaks. Cardiac examination reveals regular rate and rhythm without murmur, gallop or rub. The abdomen is soft, nontender and nondistended. There is no clubbing, cyanosis or edema of the extremities. There is no obvious skin rash.   No focal neuro deficicts  Normal mood and affect    Allergies   Allergen Reactions    Breo Ellipta [Fluticasone Furoate-Vilanterol] Shortness Of Breath and Other (See Comments)     Difficulty breathing    Penicillins Anaphylaxis    Pulmicort [Budesonide] Shortness Of Breath     Difficulty breathing/asthma attack per patient    Avelox [Moxifloxacin]     Cephalosporins     Clindamycin/Lincomycin      Stomach pain      Codeine Itching    Levaquin [Levofloxacin In D5w]      Joint pains      Lincomycin Hcl      Stomach pain    Prednisone Other (See Comments)     Swelling head hurts neause  Skin hurts    Sulfa Antibiotics Hives    Sulfacetamide Sodium Hives     Breathing problems     Prior to Visit Medications    Medication Sig Taking? Authorizing Provider   PROVENTIL  (90 Base) MCG/ACT inhaler INHALE 2 PUFFS EVERY 4 HOURS AS NEEDED  Serge Cogan, MD   ZOLMitriptan (ZOMIG) 5 MG tablet Take 1 tablet by mouth as needed for Migraine  Patito Bonilla MD   acetaminophen (TYLENOL) 325 MG tablet Take 650 mg by mouth as needed  Historical Provider, MD   Fexofenadine-Pseudoephedrine (ALLEGRA-D 12 HOUR PO) Take  by mouth. Historical Provider, MD       Vitals:    20 1617   BP: 119/67   Pulse: 74   SpO2: 97%     There is no height or weight on file to calculate BMI.      Wt Readings from Last 3 Encounters:   19 155 lb (70.3 kg)   19 158 lb (71.7 kg)   18 157 lb (71.2 kg)     BP Readings from Last 3 Encounters:   20 119/67   19 116/68   19 116/72        Social History     Tobacco Use   Smoking Status Former Smoker    Packs/day: 0.50    Years: 11.00    Pack years: 5.50    Last attempt to quit: 2000    Years since quittin.1   Smokeless Tobacco Never Used

## 2020-03-12 ENCOUNTER — TELEPHONE (OUTPATIENT)
Dept: PULMONOLOGY | Age: 55
End: 2020-03-12

## 2020-03-12 RX ORDER — LEVALBUTEROL INHALATION SOLUTION 1.25 MG/3ML
3 SOLUTION RESPIRATORY (INHALATION) EVERY 4 HOURS PRN
Qty: 360 ML | Refills: 11 | Status: SHIPPED | OUTPATIENT
Start: 2020-03-12 | End: 2022-01-14

## 2020-03-12 NOTE — TELEPHONE ENCOUNTER
Pt called because she is waiting on approval for her Xopenex Inhaler and does not know what to do in the mean time. Suggest to her to use her nebulizer in the meant time. She stated she would do this but needs refills on these medications.  Per Dr Candance Mall refills sent

## 2020-05-29 RX ORDER — LEVALBUTEROL TARTRATE 45 UG/1
2 AEROSOL, METERED ORAL EVERY 6 HOURS PRN
Qty: 1 INHALER | Refills: 6 | Status: SHIPPED | OUTPATIENT
Start: 2020-05-29 | End: 2020-07-24

## 2020-09-02 ENCOUNTER — TELEPHONE (OUTPATIENT)
Dept: PULMONOLOGY | Age: 55
End: 2020-09-02

## 2020-09-02 NOTE — TELEPHONE ENCOUNTER
Patient's  has been tested for [address] and they are awaiting his results. She is self-quarantining until then. She wanted to know if Dr. Harriet Randall would write an order for her if he comes back positive. Please call patient at [phone].

## 2020-12-10 ENCOUNTER — TELEPHONE (OUTPATIENT)
Dept: PULMONOLOGY | Age: 55
End: 2020-12-10

## 2020-12-10 NOTE — TELEPHONE ENCOUNTER
Pt c/o coughing up dark yellow for 2 to 3 weeks / no fever / chest congestion / clear to dark yellow sinus drainage / fatigue / some sob /     Pt can take Doxycycline or Nelma Ko     Dr Jennifer Lopez please advise - pt knows that this will be addressed tomorrow since [employer] has left for the day

## 2020-12-10 NOTE — TELEPHONE ENCOUNTER
Pt left a VM requesting a call back from Meli Echeverria or Javier Gaucher about how her \"lungs are acting\".     Pt # [phone]

## 2020-12-11 ENCOUNTER — TELEPHONE (OUTPATIENT)
Dept: PULMONOLOGY | Age: 55
End: 2020-12-11

## 2020-12-11 RX ORDER — DOXYCYCLINE HYCLATE 100 MG
100 TABLET ORAL DAILY
Qty: 10 TABLET | Refills: 0 | Status: SHIPPED | OUTPATIENT
Start: 2020-12-11 | End: 2020-12-21

## 2020-12-11 RX ORDER — FLUCONAZOLE 100 MG/1
100 TABLET ORAL DAILY
Qty: 7 TABLET | Refills: 0 | Status: SHIPPED | OUTPATIENT
Start: 2020-12-11 | End: 2020-12-18

## 2020-12-11 RX ORDER — LEVALBUTEROL TARTRATE 45 UG/1
1 AEROSOL, METERED ORAL EVERY 4 HOURS PRN
Qty: 1 INHALER | Refills: 6 | Status: SHIPPED | OUTPATIENT
Start: 2020-12-11 | End: 2021-03-08 | Stop reason: SDUPTHER

## 2020-12-11 NOTE — TELEPHONE ENCOUNTER
Pt requesting a refill on Levalbuterol HFA. Pt # [phone]    Pt uses [facility] on [address].

## 2020-12-28 ENCOUNTER — TELEPHONE (OUTPATIENT)
Dept: PULMONOLOGY | Age: 55
End: 2020-12-28

## 2020-12-28 RX ORDER — AZITHROMYCIN 250 MG/1
250 TABLET, FILM COATED ORAL SEE ADMIN INSTRUCTIONS
Qty: 6 TABLET | Refills: 0 | Status: SHIPPED | OUTPATIENT
Start: 2020-12-28 | End: 2021-01-02

## 2020-12-28 NOTE — TELEPHONE ENCOUNTER
Doxycycline was prescribed 12-11-20 for 10 days was feeling better and then over Jayden sx's returned and she had to use nebulizer more(5x's a day along with her rescue inhaler). ? If she should ride this out or if she should have another round of antibiotics. 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 Current sx's are wheezing, no fever, no sinus drainage, more short of breath then usual and tired.  O2 sats are in the 90's

## 2020-12-31 ENCOUNTER — TELEPHONE (OUTPATIENT)
Dept: PULMONOLOGY | Age: 55
End: 2020-12-31

## 2020-12-31 ENCOUNTER — VIRTUAL VISIT (OUTPATIENT)
Dept: PULMONOLOGY | Age: 55
End: 2020-12-31
Payer: COMMERCIAL

## 2020-12-31 PROCEDURE — 99442 PR PHYS/QHP TELEPHONE EVALUATION 11-20 MIN: CPT | Performed by: NURSE PRACTITIONER

## 2020-12-31 RX ORDER — PREDNISONE 20 MG/1
20 TABLET ORAL DAILY
Qty: 5 TABLET | Refills: 0 | Status: SHIPPED | OUTPATIENT
Start: 2020-12-31 | End: 2021-01-05

## 2020-12-31 NOTE — PROGRESS NOTES
[facility] Follow Up Note  Pulmonology Telephone Visit    Pursuant to the emergency declaration under the [facility], [facility] waiver authority and the [employer] Act this Telephone Visit was insisted, with patient's consent, to reduce the patient's risk of exposure to COVID-19 and provide continuity of care for an established patient. The patient was at home, while the provider was at the clinic. Services were provided through a synchronous discussion through a Telephone Call to substitute for in-person clinic visit, and coded as such. Total time spent with patient was 15 minutes. Subjective:   CHIEF COMPLAINT / HPI: Vocal cord irritation    The patient is 63 y.o. female who presents today for a routine follow up visit related to the above mentioned issues. She has a PMH significant for Asthma, vocal cord dysfunction. She has chronic cough and had a flare of her asthma last week. We ordered Janit Boris. She reports she is much improved with regard to breathing but her vocal cords feel inflamed from all the coughing she has done. She historically has tolerated oral and inhaled steroids poorly but has tolerated lower doses a bit better. There are no fevers or chills. She does report some PND but hasn't tolerated nasal sprays either. She does have an ENT and plans video visit next week. She is using Albuterol. She has not tolerated other inhaled medications.         Past Medical History:   Diagnosis Date    Asthma     Migraines     Vocal cord dysfunction      Social History:    Social History     Tobacco Use   Smoking Status Former Smoker    Packs/day: 0.50    Years: 11.00    Pack years: 5.50    Quit date: 2000    Years since quittin.0   Smokeless Tobacco Never Used     Current Medications:     Current Outpatient Medications on File Prior to Visit   Medication Sig Dispense Refill  azithromycin (ZITHROMAX) 250 MG tablet Take 1 tablet by mouth See Admin Instructions for 5 days 500mg on day 1 followed by 250mg on days 2 - 5 6 tablet 0    levalbuterol (XOPENEX HFA) 45 MCG/ACT inhaler Inhale 1 puff into the lungs every 4 hours as needed for Wheezing 1 Inhaler 6    levalbuterol (XOPENEX HFA) 45 MCG/ACT inhaler INHALE 2 PUFFS BY MOUTH EVERY 6 HOURS AS NEEDED FOR WHEEZING 15 g 9    ipratropium (ATROVENT) 0.02 % nebulizer solution Take 2.5 mLs by nebulization 4 times daily DX:J45.40 Asthma 360 mL 11    Arformoterol Tartrate (BROVANA) 15 MCG/2ML NEBU Take 1 ampule by nebulization 2 times daily 120 mL 3    PROVENTIL  (90 Base) MCG/ACT inhaler INHALE 2 PUFFS EVERY 4 HOURS AS NEEDED 2 Inhaler 0    ZOLMitriptan (ZOMIG) 5 MG tablet Take 1 tablet by mouth as needed for Migraine 12 tablet 1    acetaminophen (TYLENOL) 325 MG tablet Take 650 mg by mouth as needed      Fexofenadine-Pseudoephedrine (ALLEGRA-D 12 HOUR PO) Take  by mouth.  levalbuterol (XOPENEX) 1.25 MG/3ML nebulizer solution Take 3 mLs by nebulization every 4 hours as needed for Wheezing DX:J45.40 Asthma 360 mL 11     No current facility-administered medications on file prior to visit. Review of Systems   Constitutional: Negative for chills and fever. HENT: Positive for congestion and postnasal drip. Respiratory: Positive for cough, choking and wheezing. Negative for shortness of breath. Cardiovascular: Negative for chest pain and leg swelling. Gastrointestinal: Negative for abdominal pain and constipation. Musculoskeletal: Negative for arthralgias and joint swelling. Skin: Negative for color change and pallor. Allergic/Immunologic: Negative for environmental allergies and food allergies. Psychiatric/Behavioral: Negative for agitation and confusion. Objective:       VITALS:  There were no vitals taken for this visit.      Physical exam: No in-personal physical exam was conducted as visit was done via telephone. The patient was alert and oriented throughout our conversation. She was not SOB with speaking or otherwise distressed but could hear her voice breaking during our discussion. While on the phone I did talk her through exam of the back of her throat. She reports there are no white patches or exudate. DATA:      Radiology Review:  Pertinent images / reports were reviewed as a part of this visit. CT chest done 12/24/16 reveals the following:  No evidence of a pulmonary embolus. Diffuse bronchial wall thickening with scattered mucous plugs particularly in the left lower lobe. Bilateral patchy ground-glass opacity most pronounced in the lower lobes. Ground-glass opacity may represent atelectasis related to the mucus plugging and/or pneumonia. There is mild confluent left lower lobe airspace disease. Last PFTs done 2016:  1. Spirometry revealed evidence of severe obstructive defect. FEV1 is 1.24  L, which is 43% of predicted. There is significant response to  bronchodilators in FEV1 and FVC. FEV1/FVC ratio of 52%. 2.  Lung volume revealed normal total lung capacity 6.01 L, which is 110% of  predicted. Evidence for air-trapping with residual volume of 3.26 L, which is  181% of predicted. 3.  Diffusion capacity is mildly decreased at 19.62, which is 79% of  predicted. 4.  Flow volume loops consistent with obstructive defect. CONCLUSION:  Severe obstructive defect with bronchodilator response,  air-trapping and mildly decreased diffusion capacity. Assessment / Plan:   1. Chronic cough  - Had been worse but improved s/p Zpak    2. Moderate persistent asthma without complication  - SOB is stable  - She does have some allergy symptoms and is using Mucinex D presently    3.  Vocal cord dysfunction  - Can hear her voice breaking on the line  - She denies exudate on the guided exam she conducted on herself

## 2021-03-08 ENCOUNTER — OFFICE VISIT (OUTPATIENT)
Dept: PULMONOLOGY | Age: 56
End: 2021-03-08
Payer: COMMERCIAL

## 2021-03-08 VITALS — HEART RATE: 75 BPM | OXYGEN SATURATION: 95 %

## 2021-03-08 DIAGNOSIS — J38.3 VOCAL CORD DYSFUNCTION: ICD-10-CM

## 2021-03-08 DIAGNOSIS — J45.40 MODERATE PERSISTENT ASTHMA WITHOUT COMPLICATION: ICD-10-CM

## 2021-03-08 DIAGNOSIS — R05.3 CHRONIC COUGH: Primary | ICD-10-CM

## 2021-03-08 PROCEDURE — 3017F COLORECTAL CA SCREEN DOC REV: CPT | Performed by: INTERNAL MEDICINE

## 2021-03-08 PROCEDURE — G8421 BMI NOT CALCULATED: HCPCS | Performed by: INTERNAL MEDICINE

## 2021-03-08 PROCEDURE — 99213 OFFICE O/P EST LOW 20 MIN: CPT | Performed by: INTERNAL MEDICINE

## 2021-03-08 PROCEDURE — G8484 FLU IMMUNIZE NO ADMIN: HCPCS | Performed by: INTERNAL MEDICINE

## 2021-03-08 PROCEDURE — 1036F TOBACCO NON-USER: CPT | Performed by: INTERNAL MEDICINE

## 2021-03-08 PROCEDURE — G8427 DOCREV CUR MEDS BY ELIG CLIN: HCPCS | Performed by: INTERNAL MEDICINE

## 2021-03-08 RX ORDER — FEXOFENADINE HCL 180 MG/1
180 TABLET ORAL DAILY
COMMUNITY

## 2021-03-08 RX ORDER — LEVALBUTEROL TARTRATE 45 UG/1
1 AEROSOL, METERED ORAL EVERY 4 HOURS PRN
Qty: 1 INHALER | Refills: 6 | Status: SHIPPED | OUTPATIENT
Start: 2021-03-08 | End: 2022-01-14

## 2021-03-08 RX ORDER — LEVALBUTEROL INHALATION SOLUTION 1.25 MG/3ML
1 SOLUTION RESPIRATORY (INHALATION) EVERY 4 HOURS PRN
COMMUNITY
End: 2022-01-14

## 2021-03-08 NOTE — PROGRESS NOTES
[facility]  Progress Note  Georgina Dooley MD       Kenisha Garrido   YOB: 1965    Date of Visit:  3/8/2021    Assessment/Plan:  1. Chronic cough  She is at baseline    2. Moderate persistent asthma without complication  Continue nebulizer treatment  This has worked the best for her. She never tried the [place] Mg 2/2 reaction she had previously to [address] has kept her stable. 3. Vocal cord dysfunction  Stable but does continue to have symptoms    FOLLOW UP: 12 months      Chief Complaint   Patient presents with    Cough     1 year       HPI  The patient presents with a chief complaint of moderate shortness of breath related to mild asthma of many years duration. He has mild associated cough. Exertion is a modifying factor. She has good days and bad days but overall pretty stable. She continues to take Atrovent/Xopenex in the [facility]. Had prescribed Brovana and Pulmicort but she was scared of it because of a reaction she had to [place]. She is not having fevers or chills. Review of Systems  As documented in HPI     Physical Exam:  Well developed, well nourished  Alert and oriented  Sclera is clear  No cervical adenopathy  No JVD. Chest examination is few inspiratory squeaks. Cardiac examination reveals regular rate and rhythm without murmur, gallop or rub. The abdomen is soft, nontender and nondistended. There is no clubbing, cyanosis or edema of the extremities. There is no obvious skin rash.   No focal neuro deficicts  Normal mood and affect    Allergies   Allergen Reactions    Breo Ellipta [Fluticasone Furoate-Vilanterol] Shortness Of Breath and Other (See Comments)     Difficulty breathing    Penicillins Anaphylaxis    Pulmicort [Budesonide] Shortness Of Breath     Difficulty breathing/asthma attack per patient    Avelox [Moxifloxacin]     Cephalosporins     Clindamycin/Lincomycin      Stomach pain      Codeine Itching  Levaquin [Levofloxacin In D5w]      Joint pains      Lincomycin Hcl      Stomach pain    Prednisone Other (See Comments)     Swelling head hurts neause  Skin hurts    Sulfa Antibiotics Hives    Sulfacetamide Sodium Hives     Breathing problems     Prior to Visit Medications    Medication Sig Taking? Authorizing Provider   levalbuterol (XOPENEX) 1.25 MG/3ML nebulizer solution Take 1 ampule by nebulization every 4 hours as needed for Wheezing Yes Historical Provider, MD   fexofenadine (ALLEGRA ALLERGY) 180 MG tablet Take 180 mg by mouth daily Yes Historical Provider, MD   levalbuterol (XOPENEX HFA) 45 MCG/ACT inhaler Inhale 1 puff into the lungs every 4 hours as needed for Wheezing Yes Delonte Coto MD   ipratropium (ATROVENT) 0.02 % nebulizer solution Take 2.5 mLs by nebulization 4 times daily DX:J45.40 Asthma Yes Delonte Coto MD   acetaminophen (TYLENOL) 325 MG tablet Take 650 mg by mouth as needed Yes Historical Provider, MD   levalbuterol (XOPENEX) 1.25 MG/3ML nebulizer solution Take 3 mLs by nebulization every 4 hours as needed for Wheezing DX:J45.40 Asthma  Delonte Coto MD   Arformoterol Tartrate (BROVANA) 15 MCG/2ML NEBU Take 1 ampule by nebulization 2 times daily  Patient not taking: Reported on 3/8/2021  Delonte Coto MD   ZOLMitriptan (ZOMIG) 5 MG tablet Take 1 tablet by mouth as needed for Migraine  Selene Patterson MD       Vitals:    21 1604   Pulse: 75   SpO2: 95%     There is no height or weight on file to calculate BMI.      Wt Readings from Last 3 Encounters:   19 155 lb (70.3 kg)   19 158 lb (71.7 kg)   18 157 lb (71.2 kg)     BP Readings from Last 3 Encounters:   20 119/67   19 116/68   19 116/72        Social History     Tobacco Use   Smoking Status Former Smoker    Packs/day: 0.50    Years: 11.00    Pack years: 5.50    Quit date: 2000    Years since quittin.1   Smokeless Tobacco Never Used

## 2021-06-28 ENCOUNTER — TELEPHONE (OUTPATIENT)
Dept: PULMONOLOGY | Age: 56
End: 2021-06-28

## 2021-06-28 RX ORDER — LEVALBUTEROL TARTRATE 45 UG/1
1 AEROSOL, METERED ORAL EVERY 4 HOURS PRN
Qty: 1 INHALER | Refills: 6 | Status: SHIPPED | OUTPATIENT
Start: 2021-06-28 | End: 2021-07-26

## 2021-06-28 NOTE — TELEPHONE ENCOUNTER
Received PA request for Xopenex [facility] Inhaler Called [phone] to place PA Pt DM#[id number]  Told it will go onto review Case # [id number]

## 2021-06-28 NOTE — TELEPHONE ENCOUNTER
Pt needs a refill of levalbuterol [facility]) 45 MCG/ACT inhaler [[id number]] sent to [facility], [facility]

## 2021-09-01 RX ORDER — LEVALBUTEROL TARTRATE 45 UG/1
2 AEROSOL, METERED ORAL EVERY 4 HOURS PRN
Qty: 1 EACH | Refills: 11 | Status: SHIPPED | OUTPATIENT
Start: 2021-09-01 | End: 2022-01-14

## 2021-11-22 ENCOUNTER — TELEPHONE (OUTPATIENT)
Dept: PULMONOLOGY | Age: 56
End: 2021-11-22

## 2021-11-22 RX ORDER — DOXYCYCLINE HYCLATE 100 MG
100 TABLET ORAL DAILY
Qty: 10 TABLET | Refills: 0 | Status: SHIPPED | OUTPATIENT
Start: 2021-11-22 | End: 2021-12-02

## 2021-11-22 NOTE — TELEPHONE ENCOUNTER
Patient called said she is having some congestion in her chest which is normal for her this time of year, like it is the start of bronchitis. She was exposed to COVID, but tested negative. She is using her nebulizer 4 times a day now. She has no other symptoms. Please call patient at [phone].

## 2022-01-13 DIAGNOSIS — J45.40 MODERATE PERSISTENT ASTHMA WITHOUT COMPLICATION: Primary | ICD-10-CM

## 2022-01-14 RX ORDER — LEVALBUTEROL TARTRATE 45 UG/1
1 AEROSOL, METERED ORAL EVERY 4 HOURS PRN
Qty: 15 G | Refills: 2 | Status: SHIPPED | OUTPATIENT
Start: 2022-01-14 | End: 2023-01-14

## 2022-02-18 RX ORDER — LEVALBUTEROL TARTRATE 45 UG/1
2 AEROSOL, METERED ORAL EVERY 4 HOURS PRN
Qty: 1 EACH | Refills: 3 | Status: SHIPPED | OUTPATIENT
Start: 2022-02-18 | End: 2022-03-14 | Stop reason: SDUPTHER

## 2022-03-14 ENCOUNTER — OFFICE VISIT (OUTPATIENT)
Dept: PULMONOLOGY | Age: 57
End: 2022-03-14
Payer: COMMERCIAL

## 2022-03-14 VITALS — OXYGEN SATURATION: 94 % | HEART RATE: 74 BPM

## 2022-03-14 DIAGNOSIS — J38.3 VOCAL CORD DYSFUNCTION: ICD-10-CM

## 2022-03-14 DIAGNOSIS — J45.40 MODERATE PERSISTENT ASTHMA WITHOUT COMPLICATION: ICD-10-CM

## 2022-03-14 DIAGNOSIS — R05.3 CHRONIC COUGH: Primary | ICD-10-CM

## 2022-03-14 PROCEDURE — 99213 OFFICE O/P EST LOW 20 MIN: CPT | Performed by: INTERNAL MEDICINE

## 2022-03-14 PROCEDURE — 1036F TOBACCO NON-USER: CPT | Performed by: INTERNAL MEDICINE

## 2022-03-14 PROCEDURE — G8427 DOCREV CUR MEDS BY ELIG CLIN: HCPCS | Performed by: INTERNAL MEDICINE

## 2022-03-14 PROCEDURE — G8484 FLU IMMUNIZE NO ADMIN: HCPCS | Performed by: INTERNAL MEDICINE

## 2022-03-14 PROCEDURE — 3017F COLORECTAL CA SCREEN DOC REV: CPT | Performed by: INTERNAL MEDICINE

## 2022-03-14 PROCEDURE — G8421 BMI NOT CALCULATED: HCPCS | Performed by: INTERNAL MEDICINE

## 2022-03-14 RX ORDER — LEVALBUTEROL TARTRATE 45 UG/1
2 AEROSOL, METERED ORAL EVERY 4 HOURS PRN
Qty: 1 EACH | Refills: 11 | Status: SHIPPED | OUTPATIENT
Start: 2022-03-14 | End: 2023-03-14

## 2022-03-14 NOTE — PROGRESS NOTES
[facility] [place]  Progress Note  Karly Bronson MD       Inessa Abdi   YOB: 1965    Date of Visit:  3/14/2022    Assessment/Plan:  1. Chronic cough  She is at baseline    2. Moderate persistent asthma without complication  Continue nebulizer treatment  This has worked the best for her. She never tried the [place] Mg 2/2 reaction she had previously to [address] has kept her stable. She takes NAC at times and feels like it benefits her  Discussed medicine such as to pick sent but she is afraid to try anything new    3. Vocal cord dysfunction  Stable but does continue to have symptoms    FOLLOW UP: 12 months      Chief Complaint   Patient presents with    Cough     yearly        HPI  The patient presents with a chief complaint of moderate shortness of breath related to mild asthma of many years duration. He has mild associated cough. Exertion is a modifying factor. She has good days and bad days but overall pretty stable. She continues to take Atrovent[facility]openex in the [facility]. Had prescribed Brovana and Pulmicort but she was scared of it because of a reaction she had to [place]. She is not having fevers or chills. Review of Systems  As documented in HPI     Physical Exam:  Well developed, well nourished  Alert and oriented  Sclera is clear  No cervical adenopathy  No JVD. Chest examination is few inspiratory squeaks. Cardiac examination reveals regular rate and rhythm without murmur, gallop or rub. The abdomen is soft, nontender and nondistended. There is no clubbing, cyanosis or edema of the extremities. There is no obvious skin rash.   No focal neuro deficicts  Normal mood and affect    Allergies   Allergen Reactions    Breo Ellipta [Fluticasone Furoate-Vilanterol] Shortness Of Breath and Other (See Comments)     Difficulty breathing    Penicillins Anaphylaxis    Pulmicort [Budesonide] Shortness Of Breath     Difficulty breathing/asthma attack per patient    Avelox [Moxifloxacin]     Cephalosporins     Clindamycin/Lincomycin      Stomach pain      Codeine Itching    Levaquin [Levofloxacin In D5w]      Joint pains      Lincomycin Hcl      Stomach pain    Prednisone Other (See Comments)     Swelling head hurts neause  Skin hurts    Sulfa Antibiotics Hives    Sulfacetamide Sodium Hives     Breathing problems     Prior to Visit Medications    Medication Sig Taking? Authorizing Provider   levalbuterol [facility] HFA) 45 MCG/ACT inhaler Inhale 2 puffs into the lungs every 4 hours as needed for Wheezing  Pennie Vance MD   levalbuterol (XOPENEX HFA) 45 MCG/ACT inhaler INHALE 1 PUFF INTO THE LUNGS EVERY 4 HOURS AS NEEDED FOR WHEEZING  Pennie Vance MD   fexofenadine (ALLEGRA ALLERGY) 180 MG tablet Take 180 mg by mouth daily  Historical Provider, MD   ipratropium (ATROVENT) 0.02 % nebulizer solution Take 2.5 mLs by nebulization 4 times daily DX:J45.40 Asthma  Pennie Vance MD   ZOLMitriptan (ZOMIG) 5 MG tablet Take 1 tablet by mouth as needed for Migraine  Scherry MD Kevin   acetaminophen (TYLENOL) 325 MG tablet Take 650 mg by mouth as needed  Historical Provider, MD       Vitals:    22 1553   Pulse: 74   SpO2: 94%     There is no height or weight on file to calculate BMI.      Wt Readings from Last 3 Encounters:   19 155 lb (70.3 kg)   19 158 lb (71.7 kg)   18 157 lb (71.2 kg)     BP Readings from Last 3 Encounters:   20 119/67   19 116/68   19 116/72        Social History     Tobacco Use   Smoking Status Former Smoker    Packs/day: 0.50    Years: 11.00    Pack years: 5.50    Quit date: 2000    Years since quittin.2   Smokeless Tobacco Never Used

## 2022-07-29 ENCOUNTER — TELEPHONE (OUTPATIENT)
Dept: PULMONOLOGY | Age: 57
End: 2022-07-29

## 2022-07-29 NOTE — TELEPHONE ENCOUNTER
Pt called and needs refills on:    levalbuterol [facility]) 45 MCG/ACT inhaler       Send to:    [facility] [address] [facility], [address] [place] [id number]   Phone:  [phone]  Fax:  [phone]

## 2022-09-07 ENCOUNTER — TELEPHONE (OUTPATIENT)
Dept: PULMONOLOGY | Age: 57
End: 2022-09-07

## 2022-09-07 NOTE — TELEPHONE ENCOUNTER
Pt called francine and I went over the options of getting a nebulizer Either thru [facility](bill insurance) or local pharmacy(out of pocket) She is going to check with her new insurance and see what the better option is and call us back

## 2022-09-07 NOTE — TELEPHONE ENCOUNTER
Patient called and said she needs and new nebulizer and just has a few questions before an order would be placed for a new nebulizer.       Please give her a call at    [facility] 30: [phone]

## 2022-09-19 ENCOUNTER — TELEPHONE (OUTPATIENT)
Dept: PULMONOLOGY | Age: 57
End: 2022-09-19

## 2022-09-19 NOTE — TELEPHONE ENCOUNTER
Patient called and wanted to know if her diagnosis for COPD is on record she is about to apply for a supplement insurance and she needs something that states she has COPD.        [facility]: [phone]

## 2022-11-11 ENCOUNTER — APPOINTMENT (OUTPATIENT)
Dept: GENERAL RADIOLOGY | Age: 57
DRG: 871 | End: 2022-11-11
Payer: COMMERCIAL

## 2022-11-11 ENCOUNTER — HOSPITAL ENCOUNTER (INPATIENT)
Age: 57
LOS: 4 days | Discharge: HOME | DRG: 871 | End: 2022-11-15
Attending: EMERGENCY MEDICINE | Admitting: INTERNAL MEDICINE
Payer: COMMERCIAL

## 2022-11-11 ENCOUNTER — TELEPHONE (OUTPATIENT)
Dept: PULMONOLOGY | Age: 57
End: 2022-11-11

## 2022-11-11 DIAGNOSIS — R09.02 HYPOXIA: ICD-10-CM

## 2022-11-11 DIAGNOSIS — E87.1 HYPONATREMIA: ICD-10-CM

## 2022-11-11 DIAGNOSIS — J69.0 ACUTE ASPIRATION PNEUMONIA (HCC): Primary | ICD-10-CM

## 2022-11-11 LAB
A/G RATIO: 1.4 (ref 1.1–2.2)
ALBUMIN SERPL-MCNC: 4.3 G/DL (ref 3.4–5)
ALP BLD-CCNC: 59 U/L (ref 40–129)
ALT SERPL-CCNC: 42 U/L (ref 10–40)
AMORPHOUS: NORMAL /HPF
ANION GAP SERPL CALCULATED.3IONS-SCNC: 14 MMOL/L (ref 3–16)
AST SERPL-CCNC: 52 U/L (ref 15–37)
BASOPHILS ABSOLUTE: 0 K/UL (ref 0–0.2)
BASOPHILS RELATIVE PERCENT: 0.3 %
BILIRUB SERPL-MCNC: 0.5 MG/DL (ref 0–1)
BILIRUBIN URINE: NEGATIVE
BLOOD, URINE: ABNORMAL
BUN BLDV-MCNC: 9 MG/DL (ref 7–20)
CALCIUM SERPL-MCNC: 9.1 MG/DL (ref 8.3–10.6)
CHLORIDE BLD-SCNC: 96 MMOL/L (ref 99–110)
CLARITY: CLEAR
CO2: 21 MMOL/L (ref 21–32)
COLOR: ABNORMAL
CREAT SERPL-MCNC: 0.6 MG/DL (ref 0.6–1.1)
EOSINOPHILS ABSOLUTE: 0 K/UL (ref 0–0.6)
EOSINOPHILS RELATIVE PERCENT: 0.2 %
GFR SERPL CREATININE-BSD FRML MDRD: >60 ML/MIN/{1.73_M2}
GLUCOSE BLD-MCNC: 101 MG/DL (ref 70–99)
GLUCOSE URINE: NEGATIVE MG/DL
HCT VFR BLD CALC: 43.5 % (ref 36–48)
HEMOGLOBIN: 14.3 G/DL (ref 12–16)
KETONES, URINE: ABNORMAL MG/DL
LACTIC ACID, SEPSIS: 0.9 MMOL/L (ref 0.4–1.9)
LEUKOCYTE ESTERASE, URINE: NEGATIVE
LYMPHOCYTES ABSOLUTE: 0.5 K/UL (ref 1–5.1)
LYMPHOCYTES RELATIVE PERCENT: 6.8 %
MCH RBC QN AUTO: 29.4 PG (ref 26–34)
MCHC RBC AUTO-ENTMCNC: 32.9 G/DL (ref 31–36)
MCV RBC AUTO: 89.4 FL (ref 80–100)
MICROSCOPIC EXAMINATION: YES
MONOCYTES ABSOLUTE: 0.7 K/UL (ref 0–1.3)
MONOCYTES RELATIVE PERCENT: 10.7 %
NEUTROPHILS ABSOLUTE: 5.6 K/UL (ref 1.7–7.7)
NEUTROPHILS RELATIVE PERCENT: 82 %
NITRITE, URINE: NEGATIVE
PDW BLD-RTO: 13.7 % (ref 12.4–15.4)
PH UA: 6 (ref 5–8)
PLATELET # BLD: 217 K/UL (ref 135–450)
PMV BLD AUTO: 7.8 FL (ref 5–10.5)
POTASSIUM REFLEX MAGNESIUM: 4 MMOL/L (ref 3.5–5.1)
PROCALCITONIN: 0.23 NG/ML (ref 0–0.15)
PROTEIN UA: NEGATIVE MG/DL
RAPID INFLUENZA  B AGN: NEGATIVE
RAPID INFLUENZA A AGN: NEGATIVE
RBC # BLD: 4.87 M/UL (ref 4–5.2)
RBC UA: NORMAL /HPF (ref 0–4)
SARS-COV-2, NAAT: NOT DETECTED
SODIUM BLD-SCNC: 131 MMOL/L (ref 136–145)
SPECIFIC GRAVITY UA: <=1.005 (ref 1–1.03)
TOTAL PROTEIN: 7.3 G/DL (ref 6.4–8.2)
URINE REFLEX TO CULTURE: ABNORMAL
URINE TYPE: ABNORMAL
UROBILINOGEN, URINE: 0.2 E.U./DL
WBC # BLD: 6.9 K/UL (ref 4–11)
WBC UA: NORMAL /HPF (ref 0–5)

## 2022-11-11 PROCEDURE — 2580000003 HC RX 258: Performed by: INTERNAL MEDICINE

## 2022-11-11 PROCEDURE — 85025 COMPLETE CBC W/AUTO DIFF WBC: CPT

## 2022-11-11 PROCEDURE — 6360000002 HC RX W HCPCS: Performed by: INTERNAL MEDICINE

## 2022-11-11 PROCEDURE — 94761 N-INVAS EAR/PLS OXIMETRY MLT: CPT

## 2022-11-11 PROCEDURE — 2500000003 HC RX 250 WO HCPCS: Performed by: EMERGENCY MEDICINE

## 2022-11-11 PROCEDURE — 93005 ELECTROCARDIOGRAM TRACING: CPT | Performed by: PHYSICIAN ASSISTANT

## 2022-11-11 PROCEDURE — 96365 THER/PROPH/DIAG IV INF INIT: CPT

## 2022-11-11 PROCEDURE — 6370000000 HC RX 637 (ALT 250 FOR IP): Performed by: INTERNAL MEDICINE

## 2022-11-11 PROCEDURE — 71045 X-RAY EXAM CHEST 1 VIEW: CPT

## 2022-11-11 PROCEDURE — 2580000003 HC RX 258: Performed by: PHYSICIAN ASSISTANT

## 2022-11-11 PROCEDURE — 96367 TX/PROPH/DG ADDL SEQ IV INF: CPT

## 2022-11-11 PROCEDURE — 6360000002 HC RX W HCPCS: Performed by: PHYSICIAN ASSISTANT

## 2022-11-11 PROCEDURE — 83605 ASSAY OF LACTIC ACID: CPT

## 2022-11-11 PROCEDURE — 81001 URINALYSIS AUTO W/SCOPE: CPT

## 2022-11-11 PROCEDURE — 2580000003 HC RX 258: Performed by: EMERGENCY MEDICINE

## 2022-11-11 PROCEDURE — 87635 SARS-COV-2 COVID-19 AMP PRB: CPT

## 2022-11-11 PROCEDURE — 2700000000 HC OXYGEN THERAPY PER DAY

## 2022-11-11 PROCEDURE — 2500000003 HC RX 250 WO HCPCS: Performed by: INTERNAL MEDICINE

## 2022-11-11 PROCEDURE — 87040 BLOOD CULTURE FOR BACTERIA: CPT

## 2022-11-11 PROCEDURE — 80053 COMPREHEN METABOLIC PANEL: CPT

## 2022-11-11 PROCEDURE — 84145 PROCALCITONIN (PCT): CPT

## 2022-11-11 PROCEDURE — 1200000000 HC SEMI PRIVATE

## 2022-11-11 PROCEDURE — 99285 EMERGENCY DEPT VISIT HI MDM: CPT

## 2022-11-11 PROCEDURE — 87804 INFLUENZA ASSAY W/OPTIC: CPT

## 2022-11-11 PROCEDURE — 94640 AIRWAY INHALATION TREATMENT: CPT

## 2022-11-11 RX ORDER — ONDANSETRON 4 MG/1
4 TABLET, ORALLY DISINTEGRATING ORAL EVERY 8 HOURS PRN
Status: DISCONTINUED | OUTPATIENT
Start: 2022-11-11 | End: 2022-11-15 | Stop reason: HOSPADM

## 2022-11-11 RX ORDER — ACETAMINOPHEN 650 MG/1
650 SUPPOSITORY RECTAL EVERY 6 HOURS PRN
Status: DISCONTINUED | OUTPATIENT
Start: 2022-11-11 | End: 2022-11-15 | Stop reason: HOSPADM

## 2022-11-11 RX ORDER — SODIUM CHLORIDE 9 MG/ML
INJECTION, SOLUTION INTRAVENOUS PRN
Status: DISCONTINUED | OUTPATIENT
Start: 2022-11-11 | End: 2022-11-15 | Stop reason: HOSPADM

## 2022-11-11 RX ORDER — ENOXAPARIN SODIUM 100 MG/ML
40 INJECTION SUBCUTANEOUS DAILY
Status: DISCONTINUED | OUTPATIENT
Start: 2022-11-12 | End: 2022-11-15 | Stop reason: HOSPADM

## 2022-11-11 RX ORDER — SODIUM CHLORIDE 9 MG/ML
INJECTION, SOLUTION INTRAVENOUS CONTINUOUS
Status: ACTIVE | OUTPATIENT
Start: 2022-11-11 | End: 2022-11-12

## 2022-11-11 RX ORDER — ALBUTEROL SULFATE 2.5 MG/3ML
5 SOLUTION RESPIRATORY (INHALATION) ONCE
Status: COMPLETED | OUTPATIENT
Start: 2022-11-11 | End: 2022-11-11

## 2022-11-11 RX ORDER — GUAIFENESIN 600 MG/1
600 TABLET, EXTENDED RELEASE ORAL 2 TIMES DAILY
Status: DISCONTINUED | OUTPATIENT
Start: 2022-11-11 | End: 2022-11-15 | Stop reason: HOSPADM

## 2022-11-11 RX ORDER — LEVALBUTEROL TARTRATE 45 UG/1
2 AEROSOL, METERED ORAL EVERY 6 HOURS PRN
Status: DISCONTINUED | OUTPATIENT
Start: 2022-11-11 | End: 2022-11-15 | Stop reason: HOSPADM

## 2022-11-11 RX ORDER — ACETAMINOPHEN 325 MG/1
650 TABLET ORAL EVERY 6 HOURS PRN
Status: DISCONTINUED | OUTPATIENT
Start: 2022-11-11 | End: 2022-11-15 | Stop reason: HOSPADM

## 2022-11-11 RX ORDER — LEVALBUTEROL TARTRATE 45 UG/1
AEROSOL, METERED ORAL
Qty: 15 EACH | Refills: 6 | Status: SHIPPED | OUTPATIENT
Start: 2022-11-11

## 2022-11-11 RX ORDER — SODIUM CHLORIDE 0.9 % (FLUSH) 0.9 %
5-40 SYRINGE (ML) INJECTION PRN
Status: DISCONTINUED | OUTPATIENT
Start: 2022-11-11 | End: 2022-11-15 | Stop reason: HOSPADM

## 2022-11-11 RX ORDER — ONDANSETRON 2 MG/ML
4 INJECTION INTRAMUSCULAR; INTRAVENOUS EVERY 6 HOURS PRN
Status: DISCONTINUED | OUTPATIENT
Start: 2022-11-11 | End: 2022-11-15 | Stop reason: HOSPADM

## 2022-11-11 RX ORDER — FEXOFENADINE HCL 180 MG/1
180 TABLET ORAL DAILY
Status: DISCONTINUED | OUTPATIENT
Start: 2022-11-11 | End: 2022-11-15 | Stop reason: HOSPADM

## 2022-11-11 RX ORDER — ZOLMITRIPTAN 5 MG/1
5 TABLET, FILM COATED ORAL PRN
Status: DISCONTINUED | OUTPATIENT
Start: 2022-11-11 | End: 2022-11-15 | Stop reason: HOSPADM

## 2022-11-11 RX ORDER — SODIUM CHLORIDE, SODIUM LACTATE, POTASSIUM CHLORIDE, AND CALCIUM CHLORIDE .6; .31; .03; .02 G/100ML; G/100ML; G/100ML; G/100ML
30 INJECTION, SOLUTION INTRAVENOUS ONCE
Status: COMPLETED | OUTPATIENT
Start: 2022-11-11 | End: 2022-11-11

## 2022-11-11 RX ORDER — SODIUM CHLORIDE 0.9 % (FLUSH) 0.9 %
5-40 SYRINGE (ML) INJECTION EVERY 12 HOURS SCHEDULED
Status: DISCONTINUED | OUTPATIENT
Start: 2022-11-11 | End: 2022-11-15 | Stop reason: HOSPADM

## 2022-11-11 RX ORDER — POLYETHYLENE GLYCOL 3350 17 G/17G
17 POWDER, FOR SOLUTION ORAL DAILY PRN
Status: DISCONTINUED | OUTPATIENT
Start: 2022-11-11 | End: 2022-11-15 | Stop reason: HOSPADM

## 2022-11-11 RX ADMIN — ACETAMINOPHEN 650 MG: 325 TABLET ORAL at 21:13

## 2022-11-11 RX ADMIN — IPRATROPIUM BROMIDE 0.5 MG: 0.5 SOLUTION RESPIRATORY (INHALATION) at 20:56

## 2022-11-11 RX ADMIN — SODIUM CHLORIDE: 9 INJECTION, SOLUTION INTRAVENOUS at 21:20

## 2022-11-11 RX ADMIN — AZTREONAM 2000 MG: 2 INJECTION, POWDER, LYOPHILIZED, FOR SOLUTION INTRAMUSCULAR; INTRAVENOUS at 16:09

## 2022-11-11 RX ADMIN — SODIUM CHLORIDE, PRESERVATIVE FREE 10 ML: 5 INJECTION INTRAVENOUS at 23:04

## 2022-11-11 RX ADMIN — ALBUTEROL SULFATE 5 MG: 2.5 SOLUTION RESPIRATORY (INHALATION) at 15:24

## 2022-11-11 RX ADMIN — SODIUM CHLORIDE, POTASSIUM CHLORIDE, SODIUM LACTATE AND CALCIUM CHLORIDE 2235 ML: 600; 310; 30; 20 INJECTION, SOLUTION INTRAVENOUS at 16:09

## 2022-11-11 RX ADMIN — DOXYCYCLINE 200 MG: 100 INJECTION, POWDER, LYOPHILIZED, FOR SOLUTION INTRAVENOUS at 16:43

## 2022-11-11 RX ADMIN — AZTREONAM 2000 MG: 2 INJECTION, POWDER, LYOPHILIZED, FOR SOLUTION INTRAMUSCULAR; INTRAVENOUS at 23:15

## 2022-11-11 ASSESSMENT — ENCOUNTER SYMPTOMS
COUGH: 1
BACK PAIN: 0
NAUSEA: 1
VOMITING: 0
CONSTIPATION: 0
SORE THROAT: 0
SHORTNESS OF BREATH: 1
DIARRHEA: 0
ABDOMINAL PAIN: 0
RHINORRHEA: 0
EYE PAIN: 0
TACHYPNEA: 1

## 2022-11-11 ASSESSMENT — LIFESTYLE VARIABLES: HOW OFTEN DO YOU HAVE A DRINK CONTAINING ALCOHOL: NEVER

## 2022-11-11 ASSESSMENT — PAIN - FUNCTIONAL ASSESSMENT: PAIN_FUNCTIONAL_ASSESSMENT: NONE - DENIES PAIN

## 2022-11-11 NOTE — TELEPHONE ENCOUNTER
Patient called and said she started coughing yesterday. Said overnight felt like she aspirating moo a pop or rattle in vocal cord. Coughing up phlemCarmell Car) tested negative for covid, Pulse ox is 88 or below, Using nebulizer and rescue in hailer. Feels like she is going down hill.        Please give her a call at  [facility] 30: [phone]

## 2022-11-11 NOTE — H&P
Hospital Medicine History & Physical      PCP: No primary care provider on file. Date of Admission: 11/11/2022    Date of Service: Pt seen/examined on 11/11/22 and Admitted to Inpatient with expected LOS greater than two midnights due to medical therapy. Chief Complaint:  I was aspirating      History Of Present Illness:     61 y.o. female with vocal cord dysfcn, asthma who presented to [facility] with concerns for sob. Pt had possible aspiration event on Wed around mid-morning where she inhaled saliva. She then noted gradually worsening sob(baseline o2 is around 94%). She then dropped to 88% and noted inc'd DORADO. She would note sats in high 70s intermittently. She tried her nebulizer/rescue inhaler/oxygen from [facility], w/o much improvement. She noted low fever of 99.9 today. No n/v/diarrhea. She had noted clear sputum but then today had coughing spell and expectorated brownish sputum. She called her pulmonologist who instructed her to come to ER given hypoxia. -of note, she recalls Last dilated esophagus 5 years ago for hx of esophagial webs    Past Medical History:          Diagnosis Date    Asthma     Migraines     Vocal cord dysfunction        Past Surgical History:          Procedure Laterality Date    CERVICAL DISCECTOMY      CERVICAL FUSION  3/29/10    C5,6,7; [facility]    SINUS SURGERY      x2    TUBAL LIGATION         Medications Prior to Admission:      Prior to Admission medications    Medication Sig Start Date End Date Taking?  Authorizing Provider   levalbuterol [facility]) 45 MCG/ACT inhaler TAKE 2 PUFFS BY MOUTH EVERY 6 HOURS AS NEEDED FOR WHEEZE 11/11/22   Demetrio Toledo MD   levalbuterol [facility] HFA) 45 MCG/ACT inhaler Inhale 2 puffs into the lungs every 4 hours as needed for Wheezing 3/14/22 3/14/23  Demetrio Toledo MD   ipratropium (ATROVENT) 0.02 % nebulizer solution Take 2.5 mLs by nebulization 4 times daily DX:J45.40 Asthma 3/14/22   Demetrio Toledo MD levalbuterol (XOPENEX HFA) 45 MCG/ACT inhaler INHALE 1 PUFF INTO THE LUNGS EVERY 4 HOURS AS NEEDED FOR WHEEZING 1/14/22 1/14/23  Aleta Guerra MD   fexofenadine [facility] ALLERGY) 180 MG tablet Take 180 mg by mouth daily    Historical Provider, MD   ZOLMitriptan (ZOMIG) 5 MG tablet Take 1 tablet by mouth as needed for Migraine 11/14/16   Nickie Rucker MD   acetaminophen (TYLENOL) 325 MG tablet Take 650 mg by mouth as needed 5/6/16   Historical Provider, MD       Allergies:  Gayl Blonder [fluticasone furoate-vilanterol], Penicillins, Pulmicort [budesonide], Avelox [moxifloxacin], Cephalosporins, Clindamycin/lincomycin, Codeine, Levaquin [levofloxacin in d5w], Lincomycin hcl, Prednisone, Sulfa antibiotics, and Sulfacetamide sodium    Social History:      The patient currently lives at home    TOBACCO:   reports that she quit smoking about 22 years ago. Her smoking use included cigarettes. She has a 5.50 pack-year smoking history. She has never used smokeless tobacco.  ETOH:   reports current alcohol use of about 4.0 standard drinks per week. E-cigarette/Vaping       Questions Responses    E-cigarette/Vaping Use Never User    Start Date     Passive Exposure     Quit Date     Counseling Given     Comments               Family History:       Reviewed and negative in regards to presenting illness/complaint. Problem Relation Age of Onset    Alzheimer's Disease Mother     Rheum Arthritis Mother     Kidney Disease Mother     Heart Surgery Mother     Heart Disease Mother         MI in early 63's    Diabetes Maternal Grandmother     Thyroid Disease Sister        REVIEW OF SYSTEMS COMPLETED:   Pertinent positives as noted in the HPI. All other systems reviewed and negative.     PHYSICAL EXAM PERFORMED:    /68   Pulse 91   Temp 98.9 °F (37.2 °C) (Oral)   Resp 18   Ht 5' 5\" (1.651 m)   Wt 164 lb 3.2 oz (74.5 kg)   LMP 11/02/2016 Comment: prior to this one, it was 2 years ago  SpO2 98%   BMI 27.32 kg/m²     General appearance:  No apparent distress, appears stated age and cooperative. HEENT:  Normal cephalic, atraumatic without obvious deformity. Pupils equal, round, and reactive to light. Extra ocular muscles intact. Conjunctivae/corneas clear. Neck: Supple, with full range of motion. No jugular venous distention. Trachea midline. Respiratory:  Normal respiratory effort. Clear to auscultation, bilaterally without Rales/Wheezes/Rhonchi. Cardiovascular:  Regular rate and rhythm with normal S1/S2 without murmurs, rubs or gallops. Abdomen: Soft, non-tender, non-distended with normal bowel sounds. Musculoskeletal:  No clubbing, cyanosis or edema bilaterally. Full range of motion without deformity. Skin: Skin color, texture, turgor normal.  No rashes or lesions. Neurologic:  Neurovascularly intact without any focal sensory/motor deficits. Cranial nerves: II-XII intact, grossly non-focal.  Psychiatric:  Alert and oriented, thought content appropriate, normal insight  Capillary Refill: Brisk,3 seconds, normal  Peripheral Pulses: +2 palpable, equal bilaterally       Labs:     Recent Labs     11/11/22  1542   WBC 6.9   HGB 14.3   HCT 43.5        Recent Labs     11/11/22  1542   *   K 4.0   CL 96*   CO2 21   BUN 9   CREATININE 0.6   CALCIUM 9.1     Recent Labs     11/11/22  1542   AST 52*   ALT 42*   BILITOT 0.5   ALKPHOS 59     No results for input(s): INR in the last 72 hours. No results for input(s): Randene Holes in the last 72 hours.     Urinalysis:      Lab Results   Component Value Date/Time    NITRU Negative 11/11/2022 03:14 PM    WBCUA None seen 11/11/2022 03:14 PM    RBCUA 0-2 11/11/2022 03:14 PM    BLOODU TRACE-INTACT 11/11/2022 03:14 PM    SPECGRAV <=1.005 11/11/2022 03:14 PM    GLUCOSEU Negative 11/11/2022 03:14 PM       Radiology:     EKG:  I have reviewed the EKG with the following interpretation: nsr, rate of 90, nl axis, no gross ischemic changes noted    XR CHEST PORTABLE Final Result   Patchy left perihilar airspace disease may indicate an evolving pneumonia. Close radiographic follow-up recommended to ensure resolution. Consults:    None    ASSESSMENT:    Active Hospital Problems    Diagnosis Date Noted    Hypoxia [R09.02] 11/11/2022     Priority: Medium         PLAN:      Hypoxia- 86% on ra, unclear etiology, possible pneumonitis vs early aspiration pneumonia  -procal was slightly up  -multiple drug allergies noted  -stasrte on doxycycline q12h/aztreonam q8h  -albuterol nebs  -on home Prn xopenex  -bcx ordered in ER  -repeat am cxr  -mucinex  -given gentle ivfs    Asthma- moderate persistent per note, sees [facility] pulm(Dr. Hendrix)  -mgmt as outpt  -pt cannot get steroids as does not tolerate(was seen by allergy/immunology for 1 week of inpt densensitization but pt declined)    Vocal cord dysfcn-mgmt per outpt      DVT Prophylaxis: lovenox  Diet: General  Code Status: FULL    PT/OT Eval Status: not ordered    Dispo - pending workup, off oxygen       Ashley Ochoa MD    Thank you No primary care provider on file. for the opportunity to be involved in this patient's care. If you have any questions or concerns please feel free to contact me at [phone].

## 2022-11-11 NOTE — ED NOTES
Post ambulation on RA, O2 sat 89%.  No icreased WOB, no s/s of distress     Mary Grace Sanchez RN  11/11/22 1770

## 2022-11-11 NOTE — ED PROVIDER NOTES
[address] [facility]  ED  EMERGENCY DEPARTMENT ENCOUNTER        Pt Name: John Phan  MRN: [id number]  Sravanigfluis 1965  Date of evaluation: 11/11/2022  Provider: Shauna Zavala PA-C  PCP: No primary care provider on file. Note Started: 5:16 PM EST11/11/2022        I have seen and evaluated this patient with my supervising physician Pau Ureña MD.      Juaquin Mendoza.       Chief Complaint   Patient presents with    Shortness of Breath     Patient states \"she aspirated saliva yesterday\" due to vocal cord dysfunction. Patient with cough, shortness of breath and states had brown phlegm today. Patient with inhalers and nebulizer treatments without improvement; spoke with pulmonologist (advised to come in to ER). Patient states O2 sats at 90% or below with home oximeter. HISTORY OF PRESENT ILLNESS   (Location/Symptom, Timing/Onset, Context/Setting, Quality, Duration, Modifying Factors, Severity)  Note limiting factors. Chief Complaint: Shortness of breath    John Phan is a 61 y.o. female who presents to the emergency department, this patient has a history of vocal cord dysfunction, and on Monday she felt as if she choked and aspirated a significant amount of saliva. She states that throughout the week she watched her oxygen saturation fall from the mid to high 90s all the way down to the 70s at times. She does have a history of asthma, she has been using her lev albuterol and it is not maintaining her oxygen saturation rate. She sees Dr. Neeta Dickerson at [facility], as her ENT. She admits to fevers, cough, shortness of breath, nausea and generally not feeling well. She is concerned that she might have aspiration pneumonia. Nursing Notes were all reviewed and agreed with or any disagreements were addressed in the HPI.     REVIEW OF SYSTEMS    (2-9 systems for level 4, 10 or more for level 5)     Review of Systems   Constitutional:  Positive for fever. Negative for chills and diaphoresis. HENT:  Negative for congestion, ear pain, rhinorrhea and sore throat. Eyes:  Negative for pain and visual disturbance. Respiratory:  Positive for cough and shortness of breath. Cardiovascular:  Negative for chest pain, palpitations and leg swelling. Gastrointestinal:  Positive for nausea. Negative for abdominal pain, constipation, diarrhea and vomiting. Genitourinary:  Negative for decreased urine volume, dysuria, frequency and urgency. Musculoskeletal:  Negative for back pain and neck pain. Skin:  Negative for rash and wound. Neurological:  Negative for dizziness and light-headedness.      PAST MEDICAL HISTORY     Past Medical History:   Diagnosis Date    Asthma     Migraines     Vocal cord dysfunction        SURGICAL HISTORY     Past Surgical History:   Procedure Laterality Date    CERVICAL DISCECTOMY      CERVICAL FUSION  3/29/10    C5,6,7; [facility]    SINUS SURGERY      x2    TUBAL LIGATION         CURRENTMEDICATIONS       Previous Medications    ACETAMINOPHEN (TYLENOL) 325 MG TABLET    Take 650 mg by mouth as needed    FEXOFENADINE (ALLEGRA ALLERGY) 180 MG TABLET    Take 180 mg by mouth daily    IPRATROPIUM (ATROVENT) 0.02 % NEBULIZER SOLUTION    Take 2.5 mLs by nebulization 4 times daily DX:J45.40 Asthma    LEVALBUTEROL (XOPENEX HFA) 45 MCG/ACT INHALER    INHALE 1 PUFF INTO THE LUNGS EVERY 4 HOURS AS NEEDED FOR WHEEZING    LEVALBUTEROL (XOPENEX HFA) 45 MCG/ACT INHALER    Inhale 2 puffs into the lungs every 4 hours as needed for Wheezing    LEVALBUTEROL (XOPENEX HFA) 45 MCG/ACT INHALER    TAKE 2 PUFFS BY MOUTH EVERY 6 HOURS AS NEEDED FOR WHEEZE    ZOLMITRIPTAN (ZOMIG) 5 MG TABLET    Take 1 tablet by mouth as needed for Migraine       ALLERGIES     Breo ellipta [fluticasone furoate-vilanterol], Penicillins, Pulmicort [budesonide], Avelox [moxifloxacin], Cephalosporins, Clindamycin/lincomycin, Codeine, Levaquin [levofloxacin in d5w], Lincomycin hcl, Prednisone, Sulfa antibiotics, and Sulfacetamide sodium    FAMILYHISTORY       Family History   Problem Relation Age of Onset    Alzheimer's Disease Mother     Rheum Arthritis Mother     Kidney Disease Mother     Heart Surgery Mother     Heart Disease Mother         MI in early 63's    Diabetes Maternal Grandmother     Thyroid Disease Sister         SOCIAL HISTORY       Social History     Socioeconomic History    Marital status:    Occupational History    Occupation: cleaning [employer]   Tobacco Use    Smoking status: Former     Packs/day: 0.50     Years: 11.00     Pack years: 5.50     Types: Cigarettes     Quit date: 2000     Years since quittin.8    Smokeless tobacco: Never   Vaping Use    Vaping Use: Never used   Substance and Sexual Activity    Alcohol use: Yes     Alcohol/week: 4.0 standard drinks     Types: 4 Cans of beer per week    Drug use: No       SCREENINGS    Mirian Coma Scale  Eye Opening: Spontaneous  Best Verbal Response: Oriented  Best Motor Response: Obeys commands  Mirian Coma Scale Score: 15        PHYSICAL EXAM    (up to 7 for level 4, 8 or more for level 5)     ED Triage Vitals [22 1414]   BP Temp Temp Source Heart Rate Resp SpO2 Height Weight   124/75 98.9 °F (37.2 °C) Oral 99 20 (!) 86 % 5' 5\" (1.651 m) 164 lb 3.2 oz (74.5 kg)       Physical Exam  Vitals and nursing note reviewed. Constitutional:       Appearance: Normal appearance. She is well-developed. She is not ill-appearing or diaphoretic. HENT:      Head: Normocephalic and atraumatic. Right Ear: External ear normal.      Left Ear: External ear normal.      Nose: Nose normal.   Eyes:      General:         Right eye: No discharge. Left eye: No discharge. Cardiovascular:      Rate and Rhythm: Normal rate and regular rhythm. Heart sounds: Normal heart sounds. No murmur heard. No friction rub. No gallop. Pulmonary:      Effort: Pulmonary effort is normal. No respiratory distress. Breath sounds: No stridor. Wheezing and rhonchi present. No rales. Chest:      Chest wall: No tenderness. Abdominal:      General: Bowel sounds are normal. There is no distension. Palpations: Abdomen is soft. There is no mass. Tenderness: There is no abdominal tenderness. There is no guarding or rebound. Musculoskeletal:         General: Normal range of motion. Cervical back: Normal range of motion and neck supple. Skin:     General: Skin is warm and dry. Coloration: Skin is not pale. Neurological:      General: No focal deficit present. Mental Status: She is alert and oriented to person, place, and time. Psychiatric:         Mood and Affect: Mood normal.         Behavior: Behavior normal.       DIAGNOSTIC RESULTS   LABS:    Labs Reviewed   CBC WITH AUTO DIFFERENTIAL - Abnormal; Notable for the following components:       Result Value    Lymphocytes Absolute 0.5 (*)     All other components within normal limits   COMPREHENSIVE METABOLIC PANEL W/ REFLEX TO MG FOR LOW K - Abnormal; Notable for the following components:    Sodium 131 (*)     Chloride 96 (*)     Glucose 101 (*)     ALT 42 (*)     AST 52 (*)     All other components within normal limits   PROCALCITONIN - Abnormal; Notable for the following components:    Procalcitonin 0.23 (*)     All other components within normal limits   URINALYSIS WITH REFLEX TO CULTURE - Abnormal; Notable for the following components:    Ketones, Urine TRACE (*)     Blood, Urine TRACE-INTACT (*)     All other components within normal limits   COVID-19, RAPID   RAPID INFLUENZA A/B ANTIGENS   CULTURE, BLOOD 1   CULTURE, BLOOD 2   LACTATE, SEPSIS   MICROSCOPIC URINALYSIS       When ordered, only abnormal lab results are displayed. All other labs were within normal range or not returned as of this dictation. EKG:  When ordered, EKG's are interpreted by the Emergency Department Physician in the absence of a cardiologist.  Please see their note for interpretation of EKG. RADIOLOGY:   Non-plain film images such as CT, Ultrasound and MRI are read by the radiologist. [facility] radiographic images are visualized andpreliminarily interpreted by the  ED Provider with the below findings:  Portable chest reviewed before the final reading came on, concern for left lower lobe pneumonia. Interpretation perthe Radiologist below, if available at the time of this note:    XR CHEST PORTABLE   Final Result   Patchy left perihilar airspace disease may indicate an evolving pneumonia. Close radiographic follow-up recommended to ensure resolution. XR CHEST PORTABLE    Result Date: 11/11/2022  EXAMINATION: ONE XRAY VIEW OF THE CHEST 11/11/2022 2:19 pm COMPARISON: December 24, 2016. HISTORY: ORDERING SYSTEM PROVIDED HISTORY: shortness of breath TECHNOLOGIST PROVIDED HISTORY: Reason for exam:->shortness of breath Reason for Exam: SOB FINDINGS: The cardiomediastinal silhouette is normal in size. Patchy airspace disease present involving the left perihilar region may indicate an evolving pneumonia. Right lung is clear. No pleural effusion or pneumothorax is present. Patchy left perihilar airspace disease may indicate an evolving pneumonia. Close radiographic follow-up recommended to ensure resolution. PROCEDURES   Unless otherwise noted below, none     Procedures    CRITICAL CARE TIME     There was a high probability of life-threatening clinical deterioration in the patient's condition requiring my urgent intervention. I personally saw the patient and independently provided 35 minutes of non-concurrent critical care out of the total shared critical care time provided, excluding separately reportable procedures. CONSULTS:  I consulted with the pharmacist given that the patient had a significant number of allergies.   Consulted with the hospitalist    EMERGENCY DEPARTMENT COURSE and DIFFERENTIAL DIAGNOSIS/MDM:   Vitals:    Vitals:    11/11/22 1418 11/11/22 1440 11/11/22 1550 11/11/22 1620   BP:  128/85 132/73 137/76   Pulse:  96 91 96   Resp:  18 17 18   Temp:       TempSrc:       SpO2: 91% 96% 97% 96%   Weight:       Height:           Patient was given thefollowing medications:  Medications   lactated ringers bolus (2,235 mLs IntraVENous New Bag 11/11/22 1609)   doxycycline (VIBRAMYCIN) 200 mg in dextrose 5 % 250 mL IVPB (200 mg IntraVENous New Bag 11/11/22 1643)   aztreonam (AZACTAM) 2000 mg IVPB mini-bag (0 mg IntraVENous Stopped 11/11/22 1642)   albuterol (PROVENTIL) nebulizer solution 5 mg (5 mg Nebulization Given 11/11/22 1524)         Is this patient to be included in the SEP-1 Core Measure due to severe sepsis or septic shock? No   Exclusion criteria - the patient is NOT to be included for SEP-1 Core Measure due to:  2+ SIRS criteria are not met    This patient most likely has aspiration pneumonia, she was profoundly hypoxic, we did apply oxygen and routinely monitored her, multiple reassessments of the patient showed that she was maintaining her oxygen at this time. Given this patient's hypoxia we will admit the patient for continued oxygen, and IV antibiotics    I am the Primary Clinician of Record. FINAL IMPRESSION      1. Acute aspiration pneumonia ([address]    2. Hypoxia          DISPOSITION/PLAN   DISPOSITION Decision To Admit 11/11/2022 05:15:56 PM      PATIENT REFERREDTO:  No follow-up provider specified.     DISCHARGE MEDICATIONS:  New Prescriptions    No medications on file       DISCONTINUED MEDICATIONS:  Discontinued Medications    No medications on file              (Please note that portions ofthis note were completed with a voice recognition program.  Efforts were made to edit the dictations but occasionally words are mis-transcribed.)    Benjamin Herman PA-C (electronically signed)             Benjamin Herman PA-C  11/11/22 [id number]

## 2022-11-12 ENCOUNTER — APPOINTMENT (OUTPATIENT)
Dept: GENERAL RADIOLOGY | Age: 57
DRG: 871 | End: 2022-11-12
Payer: COMMERCIAL

## 2022-11-12 PROBLEM — J38.5 LARYNGOSPASMS: Status: ACTIVE | Noted: 2022-11-12

## 2022-11-12 PROBLEM — R06.02 SOB (SHORTNESS OF BREATH): Status: ACTIVE | Noted: 2022-11-12

## 2022-11-12 LAB
ANION GAP SERPL CALCULATED.3IONS-SCNC: 10 MMOL/L (ref 3–16)
BASOPHILS ABSOLUTE: 0 K/UL (ref 0–0.2)
BASOPHILS RELATIVE PERCENT: 0.5 %
BUN BLDV-MCNC: 9 MG/DL (ref 7–20)
CALCIUM SERPL-MCNC: 8.4 MG/DL (ref 8.3–10.6)
CHLORIDE BLD-SCNC: 103 MMOL/L (ref 99–110)
CO2: 24 MMOL/L (ref 21–32)
CREAT SERPL-MCNC: 0.6 MG/DL (ref 0.6–1.1)
EKG ATRIAL RATE: 90 BPM
EKG DIAGNOSIS: NORMAL
EKG P AXIS: 70 DEGREES
EKG P-R INTERVAL: 116 MS
EKG Q-T INTERVAL: 376 MS
EKG QRS DURATION: 86 MS
EKG QTC CALCULATION (BAZETT): 459 MS
EKG R AXIS: 64 DEGREES
EKG T AXIS: 32 DEGREES
EKG VENTRICULAR RATE: 90 BPM
EOSINOPHILS ABSOLUTE: 0 K/UL (ref 0–0.6)
EOSINOPHILS RELATIVE PERCENT: 0 %
GFR SERPL CREATININE-BSD FRML MDRD: >60 ML/MIN/{1.73_M2}
GLUCOSE BLD-MCNC: 111 MG/DL (ref 70–99)
HCT VFR BLD CALC: 40.2 % (ref 36–48)
HEMOGLOBIN: 13.7 G/DL (ref 12–16)
LYMPHOCYTES ABSOLUTE: 0.5 K/UL (ref 1–5.1)
LYMPHOCYTES RELATIVE PERCENT: 8.5 %
MCH RBC QN AUTO: 30.4 PG (ref 26–34)
MCHC RBC AUTO-ENTMCNC: 34.2 G/DL (ref 31–36)
MCV RBC AUTO: 88.9 FL (ref 80–100)
MONOCYTES ABSOLUTE: 0.6 K/UL (ref 0–1.3)
MONOCYTES RELATIVE PERCENT: 11.2 %
NEUTROPHILS ABSOLUTE: 4.3 K/UL (ref 1.7–7.7)
NEUTROPHILS RELATIVE PERCENT: 79.8 %
PDW BLD-RTO: 14.3 % (ref 12.4–15.4)
PLATELET # BLD: 196 K/UL (ref 135–450)
PMV BLD AUTO: 7.6 FL (ref 5–10.5)
POTASSIUM REFLEX MAGNESIUM: 3.8 MMOL/L (ref 3.5–5.1)
RBC # BLD: 4.52 M/UL (ref 4–5.2)
SODIUM BLD-SCNC: 137 MMOL/L (ref 136–145)
WBC # BLD: 5.4 K/UL (ref 4–11)

## 2022-11-12 PROCEDURE — 2580000003 HC RX 258: Performed by: INTERNAL MEDICINE

## 2022-11-12 PROCEDURE — 93010 ELECTROCARDIOGRAM REPORT: CPT | Performed by: INTERNAL MEDICINE

## 2022-11-12 PROCEDURE — 99222 1ST HOSP IP/OBS MODERATE 55: CPT | Performed by: INTERNAL MEDICINE

## 2022-11-12 PROCEDURE — 2700000000 HC OXYGEN THERAPY PER DAY

## 2022-11-12 PROCEDURE — 94761 N-INVAS EAR/PLS OXIMETRY MLT: CPT

## 2022-11-12 PROCEDURE — 71046 X-RAY EXAM CHEST 2 VIEWS: CPT

## 2022-11-12 PROCEDURE — 80048 BASIC METABOLIC PNL TOTAL CA: CPT

## 2022-11-12 PROCEDURE — 6360000002 HC RX W HCPCS: Performed by: INTERNAL MEDICINE

## 2022-11-12 PROCEDURE — 36415 COLL VENOUS BLD VENIPUNCTURE: CPT

## 2022-11-12 PROCEDURE — 2500000003 HC RX 250 WO HCPCS: Performed by: INTERNAL MEDICINE

## 2022-11-12 PROCEDURE — 94640 AIRWAY INHALATION TREATMENT: CPT

## 2022-11-12 PROCEDURE — 6370000000 HC RX 637 (ALT 250 FOR IP): Performed by: INTERNAL MEDICINE

## 2022-11-12 PROCEDURE — 85025 COMPLETE CBC W/AUTO DIFF WBC: CPT

## 2022-11-12 PROCEDURE — 1200000000 HC SEMI PRIVATE

## 2022-11-12 RX ORDER — LEVALBUTEROL 1.25 MG/.5ML
0.63 SOLUTION, CONCENTRATE RESPIRATORY (INHALATION) EVERY 8 HOURS
Status: DISCONTINUED | OUTPATIENT
Start: 2022-11-12 | End: 2022-11-12

## 2022-11-12 RX ORDER — LEVALBUTEROL 1.25 MG/.5ML
0.63 SOLUTION, CONCENTRATE RESPIRATORY (INHALATION) EVERY 8 HOURS
Status: DISCONTINUED | OUTPATIENT
Start: 2022-11-13 | End: 2022-11-14

## 2022-11-12 RX ADMIN — AZTREONAM 2000 MG: 2 INJECTION, POWDER, LYOPHILIZED, FOR SOLUTION INTRAMUSCULAR; INTRAVENOUS at 06:05

## 2022-11-12 RX ADMIN — IPRATROPIUM BROMIDE 0.5 MG: 0.5 SOLUTION RESPIRATORY (INHALATION) at 12:09

## 2022-11-12 RX ADMIN — ACETAMINOPHEN 650 MG: 325 TABLET ORAL at 03:33

## 2022-11-12 RX ADMIN — AZTREONAM 2000 MG: 2 INJECTION, POWDER, LYOPHILIZED, FOR SOLUTION INTRAMUSCULAR; INTRAVENOUS at 21:19

## 2022-11-12 RX ADMIN — DOXYCYCLINE 200 MG: 100 INJECTION, POWDER, LYOPHILIZED, FOR SOLUTION INTRAVENOUS at 16:22

## 2022-11-12 RX ADMIN — ACETAMINOPHEN 650 MG: 325 TABLET ORAL at 15:24

## 2022-11-12 RX ADMIN — DOXYCYCLINE 200 MG: 100 INJECTION, POWDER, LYOPHILIZED, FOR SOLUTION INTRAVENOUS at 03:27

## 2022-11-12 RX ADMIN — LEVALBUTEROL HYDROCHLORIDE 0.63 MG: 1.25 SOLUTION, CONCENTRATE RESPIRATORY (INHALATION) at 19:49

## 2022-11-12 RX ADMIN — IPRATROPIUM BROMIDE 0.5 MG: 0.5 SOLUTION RESPIRATORY (INHALATION) at 15:53

## 2022-11-12 RX ADMIN — AZTREONAM 2000 MG: 2 INJECTION, POWDER, LYOPHILIZED, FOR SOLUTION INTRAMUSCULAR; INTRAVENOUS at 15:22

## 2022-11-12 RX ADMIN — IPRATROPIUM BROMIDE 0.5 MG: 0.5 SOLUTION RESPIRATORY (INHALATION) at 08:27

## 2022-11-12 RX ADMIN — GUAIFENESIN 600 MG: 600 TABLET, EXTENDED RELEASE ORAL at 09:56

## 2022-11-12 RX ADMIN — ACETAMINOPHEN 650 MG: 325 TABLET ORAL at 23:44

## 2022-11-12 RX ADMIN — ENOXAPARIN SODIUM 40 MG: 100 INJECTION SUBCUTANEOUS at 09:56

## 2022-11-12 RX ADMIN — SALINE NASAL SPRAY 1 SPRAY: 1.5 SOLUTION NASAL at 16:23

## 2022-11-12 RX ADMIN — SODIUM CHLORIDE 25 ML/HR: 9 INJECTION, SOLUTION INTRAVENOUS at 15:21

## 2022-11-12 ASSESSMENT — PAIN DESCRIPTION - DESCRIPTORS: DESCRIPTORS: ACHING

## 2022-11-12 ASSESSMENT — PAIN SCALES - GENERAL: PAINLEVEL_OUTOF10: 0

## 2022-11-12 ASSESSMENT — PAIN DESCRIPTION - LOCATION: LOCATION: CHEST

## 2022-11-12 NOTE — ED PROVIDER NOTES
I independently examined and evaluated Emperatriz Christensen. All diagnostic, treatment, and disposition decisions were made by myself in conjunction with the RIC, Holly Gama. For all further details of the patient's emergency department visit, please see their documentation. 58-year-old female presents concerning for aspiration event. She states she has chronic vocal cord dysfunction and has aspirated in the past.  She thinks she aspirated saliva yesterday and has developed shortness of breath and a productive cough. She states her oxygen sats were at 90% at home which is not normal for her. Vitals:    11/12/22 1505   BP: 108/70   Pulse: 90   Resp: 20   Temp: 98.9 °F (37.2 °C)   SpO2: 92%       Here she is coughing during the exam but no acute distress. Heart is regular rate and rhythm. Results for orders placed or performed during the hospital encounter of 11/11/22   Blood Culture 1    Specimen: Blood   Result Value Ref Range    Blood Culture, Routine       No Growth to date. Any change in status will be called. Culture, Blood 2    Specimen: Blood   Result Value Ref Range    Culture, Blood 2       No Growth to date. Any change in status will be called.    COVID-19, Rapid    Specimen: Nasopharyngeal Swab   Result Value Ref Range    SARS-CoV-2, NAAT Not Detected Not Detected   Rapid influenza A/B antigens    Specimen: Nasopharyngeal   Result Value Ref Range    Rapid Influenza A Ag Negative Negative    Rapid Influenza B Ag Negative Negative   CBC with Auto Differential   Result Value Ref Range    WBC 6.9 4.0 - 11.0 K/uL    RBC 4.87 4.00 - 5.20 M/uL    Hemoglobin 14.3 12.0 - 16.0 g/dL    Hematocrit 43.5 36.0 - 48.0 %    MCV 89.4 80.0 - 100.0 fL    MCH 29.4 26.0 - 34.0 pg    MCHC 32.9 31.0 - 36.0 g/dL    RDW 13.7 12.4 - 15.4 %    Platelets 204 769 - 682 K/uL    MPV 7.8 5.0 - 10.5 fL    Neutrophils % 82.0 %    Lymphocytes % 6.8 %    Monocytes % 10.7 %    Eosinophils % 0.2 %    Basophils % 0.3 % Neutrophils Absolute 5.6 1.7 - 7.7 K/uL    Lymphocytes Absolute 0.5 (L) 1.0 - 5.1 K/uL    Monocytes Absolute 0.7 0.0 - 1.3 K/uL    Eosinophils Absolute 0.0 0.0 - 0.6 K/uL    Basophils Absolute 0.0 0.0 - 0.2 K/uL   CMP w/ Reflex to MG   Result Value Ref Range    Sodium 131 (L) 136 - 145 mmol/L    Potassium reflex Magnesium 4.0 3.5 - 5.1 mmol/L    Chloride 96 (L) 99 - 110 mmol/L    CO2 21 21 - 32 mmol/L    Anion Gap 14 3 - 16    Glucose 101 (H) 70 - 99 mg/dL    BUN 9 7 - 20 mg/dL    Creatinine 0.6 0.6 - 1.1 mg/dL    Est, Glom Filt Rate >60 >60    Calcium 9.1 8.3 - 10.6 mg/dL    Total Protein 7.3 6.4 - 8.2 g/dL    Albumin 4.3 3.4 - 5.0 g/dL    Albumin/Globulin Ratio 1.4 1.1 - 2.2    Total Bilirubin 0.5 0.0 - 1.0 mg/dL    Alkaline Phosphatase 59 40 - 129 U/L    ALT 42 (H) 10 - 40 U/L    AST 52 (H) 15 - 37 U/L   Lactate, Sepsis   Result Value Ref Range    Lactic Acid, Sepsis 0.9 0.4 - 1.9 mmol/L   Procalcitonin   Result Value Ref Range    Procalcitonin 0.23 (H) 0.00 - 0.15 ng/mL   Urinalysis with Reflex to Culture    Specimen: Urine   Result Value Ref Range    Color, UA Straw Straw/Yellow    Clarity, UA Clear Clear    Glucose, Ur Negative Negative mg/dL    Bilirubin Urine Negative Negative    Ketones, Urine TRACE (A) Negative mg/dL    Specific Gravity, UA <=1.005 1.005 - 1.030    Blood, Urine TRACE-INTACT (A) Negative    pH, UA 6.0 5.0 - 8.0    Protein, UA Negative Negative mg/dL    Urobilinogen, Urine 0.2 <2.0 E.U./dL    Nitrite, Urine Negative Negative    Leukocyte Esterase, Urine Negative Negative    Microscopic Examination YES     Urine Type NotGiven     Urine Reflex to Culture Not Indicated    Microscopic Urinalysis   Result Value Ref Range    WBC, UA None seen 0 - 5 /HPF    RBC, UA 0-2 0 - 4 /HPF    Amorphous, UA Rare /HPF   Basic Metabolic Panel w/ Reflex to MG   Result Value Ref Range    Sodium 137 136 - 145 mmol/L    Potassium reflex Magnesium 3.8 3.5 - 5.1 mmol/L    Chloride 103 99 - 110 mmol/L    CO2 24 21 - 32 mmol/L    Anion Gap 10 3 - 16    Glucose 111 (H) 70 - 99 mg/dL    BUN 9 7 - 20 mg/dL    Creatinine 0.6 0.6 - 1.1 mg/dL    Est, Glom Filt Rate >60 >60    Calcium 8.4 8.3 - 10.6 mg/dL   CBC with Auto Differential   Result Value Ref Range    WBC 5.4 4.0 - 11.0 K/uL    RBC 4.52 4.00 - 5.20 M/uL    Hemoglobin 13.7 12.0 - 16.0 g/dL    Hematocrit 40.2 36.0 - 48.0 %    MCV 88.9 80.0 - 100.0 fL    MCH 30.4 26.0 - 34.0 pg    MCHC 34.2 31.0 - 36.0 g/dL    RDW 14.3 12.4 - 15.4 %    Platelets 505 827 - 040 K/uL    MPV 7.6 5.0 - 10.5 fL    Neutrophils % 79.8 %    Lymphocytes % 8.5 %    Monocytes % 11.2 %    Eosinophils % 0.0 %    Basophils % 0.5 %    Neutrophils Absolute 4.3 1.7 - 7.7 K/uL    Lymphocytes Absolute 0.5 (L) 1.0 - 5.1 K/uL    Monocytes Absolute 0.6 0.0 - 1.3 K/uL    Eosinophils Absolute 0.0 0.0 - 0.6 K/uL    Basophils Absolute 0.0 0.0 - 0.2 K/uL   EKG 12 Lead   Result Value Ref Range    Ventricular Rate 90 BPM    Atrial Rate 90 BPM    P-R Interval 116 ms    QRS Duration 86 ms    Q-T Interval 376 ms    QTc Calculation (Bazett) 459 ms    P Axis 70 degrees    R Axis 64 degrees    T Axis 32 degrees    Diagnosis       Normal sinus rhythmNormal ECGWhen compared with ECG of 24-DEC-2016 16:17,Nonspecific T wave abnormality no longer evident in Lateral leadsConfirmed by Fredi Sandhoff MD, Bola Moraes ([id number]) on 11/12/2022 9:09:46 AM         XR CHEST (2 VW)   Preliminary Result   Stable examination with mild left basilar atelectasis and subtle interstitial   patchy opacities throughout both lungs that may represent atypical pneumonia. XR CHEST PORTABLE   Final Result   Patchy left perihilar airspace disease may indicate an evolving pneumonia. Close radiographic follow-up recommended to ensure resolution. EKG  The Ekg interpreted by myself  normal sinus rhythm with a rate of 90  Axis is   Normal  QTc is  normal  Intervals and Durations are unremarkable. No specific ST-T wave changes appreciated.   No evidence of acute ischemia. When compared to the EKG from December 24, 2016 normal sinus rhythm has replaced sinus tachycardia        I personally saw and performed a substantive portion of the visit including all aspects of the medical decision making. MDM:    Here the patient has no leukocytosis and her lactic acid is normal.  Chest x-ray is concerning for patchy left perihilar airspace disease indicative of evolving pneumonia. Given her history we will admit her for IV antibiotics. She was satting 86% on room air on arrival.  She is currently on 2 L nasal cannula oxygen. Lactic acid is normal, blood cultures have been sent. Procalcitonin is slightly elevated.             Jaqueline Salmon MD  11/12/22 [id number]

## 2022-11-12 NOTE — RT PROTOCOL NOTE
RT Inhaler-Nebulizer Bronchodilator Protocol Note    There is a bronchodilator order in the chart from a provider indicating to follow the RT Bronchodilator Protocol and there is an Initiate RT Inhaler-Nebulizer Bronchodilator Protocol order as well (see protocol at bottom of note). CXR Findings:  XR CHEST PORTABLE    Result Date: 11/11/2022  Patchy left perihilar airspace disease may indicate an evolving pneumonia. Close radiographic follow-up recommended to ensure resolution. The findings from the last RT Protocol Assessment were as follows:   History Pulmonary Disease: Chronic pulmonary disease  Respiratory Pattern: Regular pattern and RR 12-20 bpm  Breath Sounds: Inspiratory and expiratory or bilateral wheezing and/or rhonchi  Cough: Strong, productive  Indication for Bronchodilator Therapy: On home bronchodilators  Bronchodilator Assessment Score: 9    Aerosolized bronchodilator medication orders have been revised according to the RT Inhaler-Nebulizer Bronchodilator Protocol below. Respiratory Therapist to perform RT Therapy Protocol Assessment initially then follow the protocol. Repeat RT Therapy Protocol Assessment PRN for score 0-3 or on second treatment, BID, and PRN for scores above 3. No Indications - adjust the frequency to every 6 hours PRN wheezing or bronchospasm, if no treatments needed after 48 hours then discontinue using Per Protocol order mode. If indication present, adjust the RT bronchodilator orders based on the Bronchodilator Assessment Score as indicated below. Use Inhaler orders unless patient has one or more of the following: on home nebulizer, not able to hold breath for 10 seconds, is not alert and oriented, cannot activate and use MDI correctly, or respiratory rate 25 breaths per minute or more, then use the equivalent nebulizer order(s) with same Frequency and PRN reasons based on the score.   If a patient is on this medication at home then do not decrease Frequency below that used at home. 0-3 - enter or revise RT bronchodilator order(s) to equivalent RT Bronchodilator order with Frequency of every 4 hours PRN for wheezing or increased work of breathing using Per Protocol order mode. 4-6 - enter or revise RT Bronchodilator order(s) to two equivalent RT bronchodilator orders with one order with BID Frequency and one order with Frequency of every 4 hours PRN wheezing or increased work of breathing using Per Protocol order mode. 7-10 - enter or revise RT Bronchodilator order(s) to two equivalent RT bronchodilator orders with one order with TID Frequency and one order with Frequency of every 4 hours PRN wheezing or increased work of breathing using Per Protocol order mode. 11-13 - enter or revise RT Bronchodilator order(s) to one equivalent RT bronchodilator order with QID Frequency and an Albuterol order with Frequency of every 4 hours PRN wheezing or increased work of breathing using Per Protocol order mode. Greater than 13 - enter or revise RT Bronchodilator order(s) to one equivalent RT bronchodilator order with every 4 hours Frequency and an Albuterol order with Frequency of every 2 hours PRN wheezing or increased work of breathing using Per Protocol order mode. RT to enter RT Home Evaluation for COPD & MDI Assessment order using Per Protocol order mode.     Electronically signed by Michael Avila RCP on 11/11/2022 at 9:01 PM

## 2022-11-12 NOTE — CONSULTS
Consult placed    Who: Dr. Doreen Head, pulmonology  Date:11/12/2022,  Time:7:34 AM    Electronically signed by Jim Werner on 11/12/2022 at 7:34 AM

## 2022-11-12 NOTE — PROGRESS NOTES
4 Eyes Skin Assessment     The patient is being assess for   Admission    I agree that 2 RN's have performed a thorough Head to Toe Skin Assessment on the patient. ALL assessment sites listed below have been assessed. Areas assessed for pressure by both nurses:   [x]   Head, Face, and Ears   [x]   Shoulders, Back, and Chest, Abdomen  [x]   Arms, Elbows, and Hands   [x]   Coccyx, Sacrum, and Ischium  [x]   Legs, Feet, and Heels        Skin Assessed Under all Medical Devices by both nurses:  N/A               All Mepilex Borders were peeled back and area peeked at by both nurses:  No: ***  Please list where Mepilex Borders are located:  N/A             **SHARE this note so that the co-signing nurse is able to place an eSignature**    Co-signer eSignature: {Esignature:[id number]}    Does the Patient have Skin Breakdown related to pressure?   No     (Insert Photo here***)         Jose Prevention initiated:  Yes   Wound Care Orders initiated:  NA      [facility] nurse consulted for Pressure Injury (Stage 3,4, Unstageable, DTI, NWPT, Complex wounds)and New or Established Ostomies:  NA      Primary Nurse eSignature: Electronically signed by Nighat Weinberg RN on 11/12/22 at 4:30 AM EST

## 2022-11-12 NOTE — PROGRESS NOTES
Hospitalist Progress Note      PCP: No primary care provider on file. Date of Admission: 11/11/2022    Chief Complaint:  I was aspirating    Hospital Course: reviewed     Subjective:  resting in bed, had fevers overnight,  on 3-4L , productive yellowish cough noted      Medications:  Reviewed    Infusion Medications    sodium chloride      sodium chloride 75 mL/hr at 11/11/22 2120     Scheduled Medications    doxycycline (VIBRAMYCIN) IV  200 mg IntraVENous Q12H    aztreonam (AZACTAM) 2000 mg IVPB mini-bag  2,000 mg IntraVENous 3 times per day    fexofenadine  180 mg Oral Daily    ipratropium  0.5 mg Nebulization 4x Daily    sodium chloride flush  5-40 mL IntraVENous 2 times per day    enoxaparin  40 mg SubCUTAneous Daily    guaiFENesin  600 mg Oral BID     PRN Meds: levalbuterol, ZOLMitriptan, sodium chloride flush, sodium chloride, ondansetron **OR** ondansetron, polyethylene glycol, acetaminophen **OR** acetaminophen    No intake or output data in the 24 hours ending 11/12/22 0724    Physical Exam Performed:    /73   Pulse 83   Temp (!) 101.4 °F (38.6 °C) (Oral)   Resp 16   Ht 5' 5\" (1.651 m)   Wt 164 lb (74.4 kg)   LMP 11/02/2016 Comment: prior to this one, it was 2 years ago  SpO2 91%   BMI 27.29 kg/m²     General appearance: No apparent distress, appears stated age and cooperative. HEENT: Pupils equal, round, and reactive to light. Conjunctivae/corneas clear. Neck: Supple, with full range of motion. No jugular venous distention. Trachea midline. Respiratory:  Normal respiratory effort. Clear to auscultation, bilaterally without Rales/Wheezes/Rhonchi. Cardiovascular: Regular rate and rhythm with normal S1/S2 without murmurs, rubs or gallops. Abdomen: Soft, non-tender, non-distended with normal bowel sounds. Musculoskeletal: No clubbing, cyanosis or edema bilaterally. Full range of motion without deformity.   Skin: Skin color, texture, turgor normal.  No rashes or lesions. Neurologic:  Neurovascularly intact without any focal sensory/motor deficits. Cranial nerves: II-XII intact, grossly non-focal.  Psychiatric: Alert and oriented, thought content appropriate, normal insight  Capillary Refill: Brisk, 3 seconds, normal   Peripheral Pulses: +2 palpable, equal bilaterally       Labs:   Recent Labs     11/11/22  1542 11/12/22  0618   WBC 6.9 5.4   HGB 14.3 13.7   HCT 43.5 40.2    196     Recent Labs     11/11/22  1542 11/12/22  0618   * 137   K 4.0 3.8   CL 96* 103   CO2 21 24   BUN 9 9   CREATININE 0.6 0.6   CALCIUM 9.1 8.4     Recent Labs     11/11/22  1542   AST 52*   ALT 42*   BILITOT 0.5   ALKPHOS 59     No results for input(s): INR in the last 72 hours. No results for input(s): Taryn Dana in the last 72 hours. Urinalysis:      Lab Results   Component Value Date/Time    NITRU Negative 11/11/2022 03:14 PM    WBCUA None seen 11/11/2022 03:14 PM    RBCUA 0-2 11/11/2022 03:14 PM    BLOODU TRACE-INTACT 11/11/2022 03:14 PM    SPECGRAV <=1.005 11/11/2022 03:14 PM    GLUCOSEU Negative 11/11/2022 03:14 PM       Radiology:  XR CHEST PORTABLE   Final Result   Patchy left perihilar airspace disease may indicate an evolving pneumonia. Close radiographic follow-up recommended to ensure resolution.          XR CHEST (2 VW)    (Results Pending)           Assessment/Plan:    Active Hospital Problems    Diagnosis     Hypoxia [R09.02]      Priority: Medium       Hypoxia- 86% on ra, unclear etiology, possible pneumonitis vs early aspiration pneumonia  -procal was slightly up  -multiple drug allergies noted  -started on doxycycline q12h/aztreonam q8h  -albuterol nebs  -on home Prn xopenex  -bcx ordered in ER  -repeat am cxr  -mucinex  -given gentle ivfs   -pulm consulted given fevers while on abx, apprec recs    Asthma- moderate persistent per note, sees [facility]m(Dr. Hendrix)  -mgmt as outpt  -pt cannot get steroids as does not tolerate(was seen by allergy/immunology for 1 week of inpt densensitization but pt declined)     Vocal cord dysfcn-mgmt per outpt        DVT Prophylaxis: lovenox  Diet: ADULT DIET;  Regular  Code Status: Full Code  PT/OT Eval Status: not ordered     Dispo - pending workup, off oxygen, pulm consulted       Appropriate for A1 Discharge Unit: Aliya Elliott MD

## 2022-11-12 NOTE — CONSULTS
INPATIENT PULMONARY CRITICAL CARE CONSULT NOTE      Chief Complaint/Referring Provider:  Patient is being seen at the request of Dr. Garth Light for a consultation for asthma, suspected aspiration pneumonia, multiple drug allergies, vocal cord dysfunction, having fevers despite IV antibiotics     Presenting HPI: Patient came to the hospital because of aspiration episode    As per the admitting provider-57 y.o. female with vocal cord dysfcn, asthma who presented to [facility] with concerns for sob. Pt had possible aspiration event on Wed around mid-morning where she inhaled saliva. She then noted gradually worsening sob(baseline o2 is around 94%). She then dropped to 88% and noted inc'd DORADO. She would note sats in high 70s intermittently. She tried her nebulizer/rescue inhaler/oxygen from [facility], w/o much improvement. She noted low fever of 99.9 today. No n/v/diarrhea. She had noted clear sputum but then today had coughing spell and expectorated brownish sputum. She called her pulmonologist who instructed her to come to ER given hypoxia.   -of note, she recalls Last dilated esophagus 5 years ago for hx of esophagial webs  Patient when seen this morning states that she came to the hospital because she is aspirating her saliva and water, patient states that she does have history of vocal cord dysfunction along with that patient also has history of laryngospasms and patient sees ENT for that along with that patient states that she has been told that she has esophageal webs, patient states that he started having episodes of aspiration followed by that patient was having increasing shortness of breath and hypoxemia, patient states that she feels as if she was hit by a truck this morning, patient does have cough with congestion, patient does not have any significant epistaxis or hemoptysis, patient did have fever and patient's T-max was 103.1 °F, patient's fever curve is trending in the right direction this morning, patient does not have any significant hematochezia or melena, no increasing leg edema, patient does not have any change in the abdomen environment any sick contacts, patient stopped smoking more than 20 years back, patient was alert and communicative, no other pertinent review of system of concern     Patient Active Problem List    Diagnosis Date Noted    SOB (shortness of breath) 2022    Laryngospasms 2022    Hypoxia 2022    CAP (community acquired pneumonia) 2017    Bronchiolitis 2016    Moderate persistent asthma without complication     Vocal cord dysfunction 10/03/2016    Aspiration into airway 10/03/2016    Former smoker 10/21/2015    RAD (reactive airway disease) 10/21/2015    Chronic cough 10/21/2015       Past Medical History:   Diagnosis Date    Asthma     Migraines     Vocal cord dysfunction         Past Surgical History:   Procedure Laterality Date    CERVICAL DISCECTOMY      CERVICAL FUSION  3/29/10    C5,6,7; Zygmunt Claus    SINUS SURGERY      x2    TUBAL LIGATION          Family History   Problem Relation Age of Onset    Alzheimer's Disease Mother     Rheum Arthritis Mother     Kidney Disease Mother     Heart Surgery Mother     Heart Disease Mother         MI in early 63's    Diabetes Maternal Grandmother     Thyroid Disease Sister         Social History     Tobacco Use    Smoking status: Former     Packs/day: 0.50     Years: 11.00     Pack years: 5.50     Types: Cigarettes     Quit date: 2000     Years since quittin.8    Smokeless tobacco: Never   Substance Use Topics    Alcohol use:  Yes     Alcohol/week: 4.0 standard drinks     Types: 4 Cans of beer per week        Allergies   Allergen Reactions    Breo Ellipta [Fluticasone Furoate-Vilanterol] Shortness Of Breath and Other (See Comments)     Difficulty breathing    Penicillins Anaphylaxis    Pulmicort [Budesonide] Shortness Of Breath     Difficulty breathing/asthma attack per patient    Avelox [Moxifloxacin]     Cephalosporins     Clindamycin/Lincomycin      Stomach pain      Codeine Itching    Levaquin [Levofloxacin In D5w]      Joint pains      Lincomycin Hcl      Stomach pain    Prednisone Other (See Comments)     Swelling head hurts neause  Skin hurts    Sulfa Antibiotics Hives    Sulfacetamide Sodium Hives     Breathing problems               Physical Exam:  Blood pressure 108/70, pulse 90, temperature 98.9 °F (37.2 °C), temperature source Oral, resp. rate 20, height 5' 5\" (1.651 m), weight 164 lb (74.4 kg), last menstrual period 11/02/2016, SpO2 92 %.'   Constitutional:  No acute distress. HENT:  Oropharynx is clear and moist. No thyromegaly. Eyes:  Conjunctivae are normal. Pupils equal, round, and reactive to light. No scleral icterus. Neck: . No tracheal deviation present. No obvious thyroid mass. Cardiovascular: Normal rate, regular rhythm, normal heart sounds. No right ventricular heave. No lower extremity edema. Pulmonary/Chest: No wheezes. Left infrascapular rales. Chest wall is not dull to percussion. No accessory muscle usage or stridor. Decreased breath sound intensity  Abdominal: Soft. Bowel sounds present. No distension or hernia. No tenderness. Musculoskeletal: No cyanosis. No clubbing. No obvious joint deformity. Lymphadenopathy: No cervical or supraclavicular adenopathy. Skin: Skin is warm and dry. No rash or nodules on the exposed extremities. Psychiatric: Normal mood and affect. Behavior is normal.  No anxiety. Neurologic: Alert, awake and oriented. PERRL.   Speech fluent        Results:  CBC:   Recent Labs     11/11/22  1542 11/12/22  0618   WBC 6.9 5.4   HGB 14.3 13.7   HCT 43.5 40.2   MCV 89.4 88.9    196     BMP:   Recent Labs     11/11/22  1542 11/12/22  0618   * 137   K 4.0 3.8   CL 96* 103   CO2 21 24   BUN 9 9   CREATININE 0.6 0.6     LIVER PROFILE:   Recent Labs     11/11/22  1542   AST 52*   ALT 42*   BILITOT 0.5   ALKPHOS 59 UA:  Recent Labs     11/11/22  1514   COLORU Straw   PHUR 6.0   WBCUA None seen   RBCUA 0-2   CLARITYU Clear   SPECGRAV <=1.005   LEUKOCYTESUR Negative   UROBILINOGEN 0.2   BILIRUBINUR Negative   BLOODU TRACE-INTACT*   GLUCOSEU Negative   AMORPHOUS Rare       Imaging:  I have reviewed radiology images personally. XR CHEST (2 VW)   Preliminary Result   Stable examination with mild left basilar atelectasis and subtle interstitial   patchy opacities throughout both lungs that may represent atypical pneumonia. XR CHEST PORTABLE   Final Result   Patchy left perihilar airspace disease may indicate an evolving pneumonia. Close radiographic follow-up recommended to ensure resolution. XR CHEST PORTABLE    Result Date: 11/11/2022  EXAMINATION: ONE XRAY VIEW OF THE CHEST 11/11/2022 2:19 pm COMPARISON: December 24, 2016. HISTORY: ORDERING SYSTEM PROVIDED HISTORY: shortness of breath TECHNOLOGIST PROVIDED HISTORY: Reason for exam:->shortness of breath Reason for Exam: SOB FINDINGS: The cardiomediastinal silhouette is normal in size. Patchy airspace disease present involving the left perihilar region may indicate an evolving pneumonia. Right lung is clear. No pleural effusion or pneumothorax is present. Patchy left perihilar airspace disease may indicate an evolving pneumonia. Close radiographic follow-up recommended to ensure resolution. CT chest in 2016-  No evidence of a pulmonary embolus. Diffuse bronchial wall thickening with scattered mucous plugs particularly in   the left lower lobe. Bilateral patchy ground-glass opacity most pronounced   in the lower lobes. Ground-glass opacity may represent atelectasis related   to the mucus plugging and/or pneumonia. There is mild confluent left lower   lobe airspace disease. Echocardiogram:None in Epic     PFT:INDICATION:  Cough. 1.  Spirometry revealed evidence of severe obstructive defect.   FEV1 is 1.24  L, which is 43% of predicted. There is significant response to  bronchodilators in FEV1 and FVC. FEV1/FVC ratio of 52%. 2.  Lung volume revealed normal total lung capacity 6.01 L, which is 110% of  predicted. Evidence for air-trapping with residual volume of 3.26 L, which is  181% of predicted. 3.  Diffusion capacity is mildly decreased at 19.62, which is 79% of  predicted. 4.  Flow volume loops consistent with obstructive defect. CONCLUSION:  Severe obstructive defect with bronchodilator response,  air-trapping and mildly decreased diffusion capacity. MBS in the past -Tracheal aspiration identified with thin liquid barium on successive swallows with straw. Assessment:  Principal Problem:    Hypoxia  Active Problems:    SOB (shortness of breath)    Laryngospasms    Former smoker    RAD (reactive airway disease)    Vocal cord dysfunction    Aspiration into airway  Resolved Problems:    * No resolved hospital problems.  *          Plan:   Oxygen supplementation to keep saturation between 90 and 94% only  Please titrate the oxygen as per the above parameters  Pulmonary toilet  Patient has been started on Azactam and doxycycline by the admitting provider on the basis of patient's clinical status and allergies and titration as per clinical status and final culture reports  If patient continues to be symptomatic  will consider a diagnostic and therapeutic bronchoscopy in the morning-patient to be kept n.p.o. provisionally after midnight  Patient is a CT of the chest from the past was reviewed  Patient's care plans from the past from Dr. Alma Delia Mukherjee was reviewed  Patient also had a modified barium swallow in the past which had shown patient to have tracheal aspiration with thin liquids  Patient may require reevaluation in terms of speech therapy and may require ENT follow-up as an outpatient  Bronchodilators  Xopenex nebulization along with Atrovent nebulization ordered on a regular basis  Xopenex inhaler on as needed basis  No need for any systemic steroids  Please make sure that patient is up-to-date on the flu shot prior to discharge from the hospital  PUD prophylaxis    Case discussed with patient and nursing    Further management depending on patient clinical status and the follow-up on above recommendations            Electronically signed by:  Nat Kraft MD    11/12/2022    6:51 PM.

## 2022-11-13 PROCEDURE — 94640 AIRWAY INHALATION TREATMENT: CPT

## 2022-11-13 PROCEDURE — 1200000000 HC SEMI PRIVATE

## 2022-11-13 PROCEDURE — 99233 SBSQ HOSP IP/OBS HIGH 50: CPT | Performed by: INTERNAL MEDICINE

## 2022-11-13 PROCEDURE — 2500000003 HC RX 250 WO HCPCS: Performed by: INTERNAL MEDICINE

## 2022-11-13 PROCEDURE — 6360000002 HC RX W HCPCS: Performed by: INTERNAL MEDICINE

## 2022-11-13 PROCEDURE — 94761 N-INVAS EAR/PLS OXIMETRY MLT: CPT

## 2022-11-13 PROCEDURE — 2580000003 HC RX 258: Performed by: INTERNAL MEDICINE

## 2022-11-13 PROCEDURE — 6360000002 HC RX W HCPCS: Performed by: NURSE PRACTITIONER

## 2022-11-13 PROCEDURE — 2700000000 HC OXYGEN THERAPY PER DAY

## 2022-11-13 PROCEDURE — 6370000000 HC RX 637 (ALT 250 FOR IP): Performed by: INTERNAL MEDICINE

## 2022-11-13 RX ORDER — ALBUTEROL SULFATE 2.5 MG/3ML
2.5 SOLUTION RESPIRATORY (INHALATION) ONCE
Status: COMPLETED | OUTPATIENT
Start: 2022-11-13 | End: 2022-11-13

## 2022-11-13 RX ADMIN — DOXYCYCLINE 200 MG: 100 INJECTION, POWDER, LYOPHILIZED, FOR SOLUTION INTRAVENOUS at 03:40

## 2022-11-13 RX ADMIN — SODIUM CHLORIDE 25 ML/HR: 9 INJECTION, SOLUTION INTRAVENOUS at 15:43

## 2022-11-13 RX ADMIN — GUAIFENESIN 600 MG: 600 TABLET, EXTENDED RELEASE ORAL at 21:36

## 2022-11-13 RX ADMIN — LEVALBUTEROL HYDROCHLORIDE 1.25 MG: 1.25 SOLUTION, CONCENTRATE RESPIRATORY (INHALATION) at 08:15

## 2022-11-13 RX ADMIN — ACETAMINOPHEN 650 MG: 325 TABLET ORAL at 10:02

## 2022-11-13 RX ADMIN — IPRATROPIUM BROMIDE 0.5 MG: 0.5 SOLUTION RESPIRATORY (INHALATION) at 15:54

## 2022-11-13 RX ADMIN — AZTREONAM 2000 MG: 2 INJECTION, POWDER, LYOPHILIZED, FOR SOLUTION INTRAMUSCULAR; INTRAVENOUS at 06:54

## 2022-11-13 RX ADMIN — GUAIFENESIN 600 MG: 600 TABLET, EXTENDED RELEASE ORAL at 10:02

## 2022-11-13 RX ADMIN — ALBUTEROL SULFATE 2.5 MG: 2.5 SOLUTION RESPIRATORY (INHALATION) at 04:32

## 2022-11-13 RX ADMIN — SODIUM CHLORIDE, PRESERVATIVE FREE 10 ML: 5 INJECTION INTRAVENOUS at 21:36

## 2022-11-13 RX ADMIN — AZTREONAM 2000 MG: 2 INJECTION, POWDER, LYOPHILIZED, FOR SOLUTION INTRAMUSCULAR; INTRAVENOUS at 21:36

## 2022-11-13 RX ADMIN — DOXYCYCLINE 200 MG: 100 INJECTION, POWDER, LYOPHILIZED, FOR SOLUTION INTRAVENOUS at 15:44

## 2022-11-13 RX ADMIN — AZTREONAM 2000 MG: 2 INJECTION, POWDER, LYOPHILIZED, FOR SOLUTION INTRAMUSCULAR; INTRAVENOUS at 14:23

## 2022-11-13 RX ADMIN — ENOXAPARIN SODIUM 40 MG: 100 INJECTION SUBCUTANEOUS at 10:06

## 2022-11-13 RX ADMIN — IPRATROPIUM BROMIDE 0.5 MG: 0.5 SOLUTION RESPIRATORY (INHALATION) at 00:58

## 2022-11-13 RX ADMIN — LEVALBUTEROL HYDROCHLORIDE 0.63 MG: 1.25 SOLUTION, CONCENTRATE RESPIRATORY (INHALATION) at 15:54

## 2022-11-13 RX ADMIN — SODIUM CHLORIDE 25 ML: 9 INJECTION, SOLUTION INTRAVENOUS at 14:18

## 2022-11-13 RX ADMIN — LEVALBUTEROL HYDROCHLORIDE 0.63 MG: 1.25 SOLUTION, CONCENTRATE RESPIRATORY (INHALATION) at 00:58

## 2022-11-13 RX ADMIN — IPRATROPIUM BROMIDE 0.5 MG: 0.5 SOLUTION RESPIRATORY (INHALATION) at 08:15

## 2022-11-13 ASSESSMENT — PAIN DESCRIPTION - LOCATION: LOCATION: HEAD

## 2022-11-13 ASSESSMENT — PAIN DESCRIPTION - DESCRIPTORS: DESCRIPTORS: ACHING

## 2022-11-13 ASSESSMENT — PAIN SCALES - GENERAL: PAINLEVEL_OUTOF10: 3

## 2022-11-13 NOTE — PROGRESS NOTES
Hospitalist Progress Note      PCP: No primary care provider on file. Date of Admission: 11/11/2022    Chief Complaint:  I was aspirating     Hospital Course: reviewed     Subjective:  down to 2L, declined bronch due to worries about bronchospasm afterwards, still with fevers       Medications:  Reviewed    Infusion Medications    sodium chloride 25 mL/hr (11/12/22 1521)     Scheduled Medications    ipratropium  0.5 mg Nebulization q8h    levalbuterol  0.63 mg Nebulization Q8H    doxycycline (VIBRAMYCIN) IV  200 mg IntraVENous Q12H    aztreonam (AZACTAM) 2000 mg IVPB mini-bag  2,000 mg IntraVENous 3 times per day    fexofenadine  180 mg Oral Daily    sodium chloride flush  5-40 mL IntraVENous 2 times per day    enoxaparin  40 mg SubCUTAneous Daily    guaiFENesin  600 mg Oral BID     PRN Meds: sodium chloride, levalbuterol, ZOLMitriptan, sodium chloride flush, sodium chloride, ondansetron **OR** ondansetron, polyethylene glycol, acetaminophen **OR** acetaminophen    No intake or output data in the 24 hours ending 11/13/22 0824    Physical Exam Performed:    /70   Pulse 86   Temp 100.4 °F (38 °C) (Oral)   Resp 18   Ht 5' 5\" (1.651 m)   Wt 164 lb (74.4 kg)   LMP 11/02/2016 Comment: prior to this one, it was 2 years ago  SpO2 95%   BMI 27.29 kg/m²     General appearance: No apparent distress, appears stated age and cooperative. HEENT: Pupils equal, round, and reactive to light. Conjunctivae/corneas clear. Neck: Supple, with full range of motion. No jugular venous distention. Trachea midline. Respiratory:  Normal respiratory effort. Clear to auscultation, bilaterally without signif Rales/Wheezes/Rhonchi. Cardiovascular: Regular rate and rhythm with normal S1/S2 without murmurs, rubs or gallops. Abdomen: Soft, non-tender, non-distended with normal bowel sounds. Musculoskeletal: No clubbing, cyanosis or edema bilaterally. Full range of motion without deformity.   Skin: Skin color, texture, turgor normal.  No rashes or lesions. Neurologic:  Neurovascularly intact without any focal sensory/motor deficits. Cranial nerves: II-XII intact, grossly non-focal.  Psychiatric: Alert and oriented, thought content appropriate, normal insight  Capillary Refill: Brisk, 3 seconds, normal   Peripheral Pulses: +2 palpable, equal bilaterally       Labs:   Recent Labs     11/11/22  1542 11/12/22  0618   WBC 6.9 5.4   HGB 14.3 13.7   HCT 43.5 40.2    196     Recent Labs     11/11/22  1542 11/12/22  0618   * 137   K 4.0 3.8   CL 96* 103   CO2 21 24   BUN 9 9   CREATININE 0.6 0.6   CALCIUM 9.1 8.4     Recent Labs     11/11/22  1542   AST 52*   ALT 42*   BILITOT 0.5   ALKPHOS 59     No results for input(s): INR in the last 72 hours. No results for input(s): Doyne Knock in the last 72 hours. Urinalysis:      Lab Results   Component Value Date/Time    NITRU Negative 11/11/2022 03:14 PM    WBCUA None seen 11/11/2022 03:14 PM    RBCUA 0-2 11/11/2022 03:14 PM    BLOODU TRACE-INTACT 11/11/2022 03:14 PM    SPECGRAV <=1.005 11/11/2022 03:14 PM    GLUCOSEU Negative 11/11/2022 03:14 PM       Radiology:  XR CHEST (2 VW)   Preliminary Result   Stable examination with mild left basilar atelectasis and subtle interstitial   patchy opacities throughout both lungs that may represent atypical pneumonia. XR CHEST PORTABLE   Final Result   Patchy left perihilar airspace disease may indicate an evolving pneumonia. Close radiographic follow-up recommended to ensure resolution.                  Assessment/Plan:    Active Hospital Problems    Diagnosis     SOB (shortness of breath) [R06.02]      Priority: Medium    Laryngospasms [J38.5]      Priority: Medium    Hypoxia [R09.02]      Priority: Medium    Aspiration into airway [T17.908A]     Vocal cord dysfunction [J38.3]     Former smoker [Z87.891]     RAD (reactive airway disease) [J45.909]        Hypoxia- 86% on ra, unclear etiology, possible pneumonitis vs early aspiration pneumonia  -procal was slightly up  -multiple drug allergies noted  -started on doxycycline q12h/aztreonam q8h  -albuterol nebs  -on home Prn xopenex  -bcx ordered in ER  -repeat am cxr 11/12(noted mild left basilar atelectasis and subtle opacities throughout both lungs suggestive of atyp pna)  -mucinex started  -given gentle ivfs   -pulm consulted given fevers while on abx, apprec recs   -bronch offered but pt declined    Asthma- moderate persistent per note, sees [facility] pulm(Dr. Hendrix)  -mgmt as outpt  -pt cannot get steroids as does not tolerate(was seen by allergy/immunology for 1 week of inpt densensitization but pt declined)     Vocal cord dysfcn-mgmt per outpt        DVT Prophylaxis: lovenox  Diet: Diet NPO  Code Status: Full Code  PT/OT Eval Status: not needed    Dispo - per pulm recs(?need for SLP/ENT eval), off oxygen, ?by Tues    Appropriate for A1 Discharge Unit: No      Joe Quintana MD

## 2022-11-13 NOTE — PROGRESS NOTES
INPATIENT PULMONARY CRITICAL CARE PROGRESS NOTE      Reason for visit    asthma, suspected aspiration pneumonia, multiple drug allergies, vocal cord dysfunction, having fevers despite IV antibiotics    SUBJECTIVE: Patient when seen this morning continues to be symptomatic, patient still has cough along with shortness of breath and was not feeling well, still has shortness of breath, patient has had a T-max of 100.4 °F, patient is hemodynamically maintained, patient was on 2 L of the oxygen with saturation of 95% when seen, patient has a sinus rhythm on the monitor, patient's glycemic control was acceptable, patient was alert and communicative, no other pertinent review of system of concern       Physical Exam:  Blood pressure 123/74, pulse 96, temperature 98.2 °F (36.8 °C), temperature source Oral, resp. rate 18, height 5' 5\" (1.651 m), weight 164 lb (74.4 kg), last menstrual period 11/02/2016, SpO2 95 %.'     Constitutional:  No acute distress. HENT:  Oropharynx is clear and moist. No thyromegaly. Eyes:  Conjunctivae are normal. Pupils equal, round, and reactive to light. No scleral icterus. Neck: . No tracheal deviation present. No obvious thyroid mass. Cardiovascular: Normal rate, regular rhythm, normal heart sounds. No right ventricular heave. No lower extremity edema. Pulmonary/Chest: No wheezes. Left infrascapular rales. Chest wall is not dull to percussion. No accessory muscle usage or stridor. Decreased breath sound intensity  Abdominal: Soft. Bowel sounds present. No distension or hernia. No tenderness. Musculoskeletal: No cyanosis. No clubbing. No obvious joint deformity. Lymphadenopathy: No cervical or supraclavicular adenopathy. Skin: Skin is warm and dry. No rash or nodules on the exposed extremities. Psychiatric: Normal mood and affect. Behavior is normal.  slight  anxiety. Neurologic: Alert, awake and oriented. PERRL.   Speech fluent       Results:  CBC:   Recent Labs 11/11/22  1542 11/12/22  0618   WBC 6.9 5.4   HGB 14.3 13.7   HCT 43.5 40.2   MCV 89.4 88.9    196     BMP:   Recent Labs     11/11/22  1542 11/12/22  0618   * 137   K 4.0 3.8   CL 96* 103   CO2 21 24   BUN 9 9   CREATININE 0.6 0.6     LIVER PROFILE:   Recent Labs     11/11/22  1542   AST 52*   ALT 42*   BILITOT 0.5   ALKPHOS 59       UA:  Recent Labs     11/11/22  1514   COLORU Straw   PHUR 6.0   WBCUA None seen   RBCUA 0-2   CLARITYU Clear   SPECGRAV <=1.005   LEUKOCYTESUR Negative   UROBILINOGEN 0.2   BILIRUBINUR Negative   BLOODU TRACE-INTACT*   GLUCOSEU Negative   AMORPHOUS Rare       Imaging:  I have reviewed radiology images personally. XR CHEST (2 VW)   Preliminary Result   Stable examination with mild left basilar atelectasis and subtle interstitial   patchy opacities throughout both lungs that may represent atypical pneumonia. XR CHEST PORTABLE   Final Result   Patchy left perihilar airspace disease may indicate an evolving pneumonia. Close radiographic follow-up recommended to ensure resolution. XR CHEST (2 VW)    Result Date: 11/12/2022  EXAMINATION: TWO XRAY VIEWS OF THE CHEST 11/12/2022 10:49 am COMPARISON: 11/11/2022 HISTORY: ORDERING SYSTEM PROVIDED HISTORY: hypoxia, ?pna, f/u cxr TECHNOLOGIST PROVIDED HISTORY: Reason for exam:->hypoxia, ?pna, f/u cxr FINDINGS: Cervical spinal fusion hardware. There is mild left basilar atelectasis. Patchy perihilar airspace opacities may represent pulmonary edema or pneumonia. Subtle linear opacities noted in the left lower lobe. No significant change since prior. No pneumothorax. Cardiomediastinal silhouette and bony thorax are unchanged. Stable examination with mild left basilar atelectasis and subtle interstitial patchy opacities throughout both lungs that may represent atypical pneumonia.      XR CHEST PORTABLE    Result Date: 11/11/2022  EXAMINATION: ONE XRAY VIEW OF THE CHEST 11/11/2022 2:19 pm COMPARISON: December 24, 2016. HISTORY: ORDERING SYSTEM PROVIDED HISTORY: shortness of breath TECHNOLOGIST PROVIDED HISTORY: Reason for exam:->shortness of breath Reason for Exam: SOB FINDINGS: The cardiomediastinal silhouette is normal in size. Patchy airspace disease present involving the left perihilar region may indicate an evolving pneumonia. Right lung is clear. No pleural effusion or pneumothorax is present. Patchy left perihilar airspace disease may indicate an evolving pneumonia. Close radiographic follow-up recommended to ensure resolution. Assessment:  Principal Problem:    Hypoxia  Active Problems:    SOB (shortness of breath)    Laryngospasms    Former smoker    RAD (reactive airway disease)    Vocal cord dysfunction    Aspiration into airway  Resolved Problems:    * No resolved hospital problems.  *          Plan:   Oxygen supplementation to keep saturation between 90 and 94% only  Please titrate the oxygen as per the above parameters  Pulmonary toilet  Patient has been started on Azactam and doxycycline by the admitting provider on the basis of patient's clinical status and allergies and titration as per clinical status and final culture reports  Patient was continued to be symptomatic and patient was told about the various options including a bronchoscopy for diagnostic and therapeutic purposes-patient is skeptical about it because of her history of ankle spasms and does not want to proceed with that  Nursing was requested to put the patient back on her given diet  Patient is a CT of the chest from the past has been reviewed  Patient's care plans from the past from Dr. Zohaib Light has been reviewed  Patient also had a modified barium swallow in the past which had shown patient to have tracheal aspiration with thin liquids  Patient may require reevaluation in terms of speech therapy and may require ENT follow-up as an outpatient  Bronchodilators  Xopenex nebulization along with Atrovent nebulization ordered on a regular basis  Xopenex inhaler on as needed basis  No need for any systemic steroids  Please make sure that patient is up-to-date on the flu shot prior to discharge from the hospital  PUD prophylaxis      Case discussed with patient and nursing        Electronically signed by:  Rodney Edmond MD    11/13/2022    12:01 PM.

## 2022-11-14 PROBLEM — J96.01 ACUTE RESPIRATORY FAILURE WITH HYPOXIA (HCC): Status: ACTIVE | Noted: 2022-11-14

## 2022-11-14 PROBLEM — J69.0 ACUTE ASPIRATION PNEUMONIA (HCC): Status: ACTIVE | Noted: 2022-11-14

## 2022-11-14 PROCEDURE — 92610 EVALUATE SWALLOWING FUNCTION: CPT

## 2022-11-14 PROCEDURE — 6360000002 HC RX W HCPCS: Performed by: INTERNAL MEDICINE

## 2022-11-14 PROCEDURE — 2700000000 HC OXYGEN THERAPY PER DAY

## 2022-11-14 PROCEDURE — 2500000003 HC RX 250 WO HCPCS: Performed by: INTERNAL MEDICINE

## 2022-11-14 PROCEDURE — 99233 SBSQ HOSP IP/OBS HIGH 50: CPT | Performed by: INTERNAL MEDICINE

## 2022-11-14 PROCEDURE — 2580000003 HC RX 258: Performed by: INTERNAL MEDICINE

## 2022-11-14 PROCEDURE — 94640 AIRWAY INHALATION TREATMENT: CPT

## 2022-11-14 PROCEDURE — 94669 MECHANICAL CHEST WALL OSCILL: CPT

## 2022-11-14 PROCEDURE — 1200000000 HC SEMI PRIVATE

## 2022-11-14 PROCEDURE — 6360000002 HC RX W HCPCS: Performed by: NURSE PRACTITIONER

## 2022-11-14 PROCEDURE — 6370000000 HC RX 637 (ALT 250 FOR IP): Performed by: INTERNAL MEDICINE

## 2022-11-14 PROCEDURE — 94761 N-INVAS EAR/PLS OXIMETRY MLT: CPT

## 2022-11-14 PROCEDURE — 92526 ORAL FUNCTION THERAPY: CPT

## 2022-11-14 RX ORDER — ALBUTEROL SULFATE 2.5 MG/3ML
2.5 SOLUTION RESPIRATORY (INHALATION) EVERY 6 HOURS PRN
Status: DISCONTINUED | OUTPATIENT
Start: 2022-11-14 | End: 2022-11-15 | Stop reason: HOSPADM

## 2022-11-14 RX ORDER — LEVALBUTEROL 1.25 MG/.5ML
0.63 SOLUTION, CONCENTRATE RESPIRATORY (INHALATION)
Status: DISCONTINUED | OUTPATIENT
Start: 2022-11-14 | End: 2022-11-15 | Stop reason: HOSPADM

## 2022-11-14 RX ADMIN — IPRATROPIUM BROMIDE 0.5 MG: 0.5 SOLUTION RESPIRATORY (INHALATION) at 15:59

## 2022-11-14 RX ADMIN — ALBUTEROL SULFATE 2.5 MG: 2.5 SOLUTION RESPIRATORY (INHALATION) at 11:53

## 2022-11-14 RX ADMIN — AZTREONAM 2000 MG: 2 INJECTION, POWDER, LYOPHILIZED, FOR SOLUTION INTRAMUSCULAR; INTRAVENOUS at 22:32

## 2022-11-14 RX ADMIN — DOXYCYCLINE 200 MG: 100 INJECTION, POWDER, LYOPHILIZED, FOR SOLUTION INTRAVENOUS at 03:56

## 2022-11-14 RX ADMIN — ENOXAPARIN SODIUM 40 MG: 100 INJECTION SUBCUTANEOUS at 09:41

## 2022-11-14 RX ADMIN — SODIUM CHLORIDE, PRESERVATIVE FREE 10 ML: 5 INJECTION INTRAVENOUS at 20:48

## 2022-11-14 RX ADMIN — ALBUTEROL SULFATE 2.5 MG: 2.5 SOLUTION RESPIRATORY (INHALATION) at 00:16

## 2022-11-14 RX ADMIN — LEVALBUTEROL HYDROCHLORIDE 1.25 MG: 1.25 SOLUTION, CONCENTRATE RESPIRATORY (INHALATION) at 16:00

## 2022-11-14 RX ADMIN — IPRATROPIUM BROMIDE 0.5 MG: 0.5 SOLUTION RESPIRATORY (INHALATION) at 19:48

## 2022-11-14 RX ADMIN — ACETAMINOPHEN 650 MG: 325 TABLET ORAL at 12:33

## 2022-11-14 RX ADMIN — LEVALBUTEROL HYDROCHLORIDE 1.25 MG: 1.25 SOLUTION, CONCENTRATE RESPIRATORY (INHALATION) at 08:09

## 2022-11-14 RX ADMIN — IPRATROPIUM BROMIDE 0.5 MG: 0.5 SOLUTION RESPIRATORY (INHALATION) at 08:14

## 2022-11-14 RX ADMIN — AZTREONAM 2000 MG: 2 INJECTION, POWDER, LYOPHILIZED, FOR SOLUTION INTRAMUSCULAR; INTRAVENOUS at 05:42

## 2022-11-14 RX ADMIN — DOXYCYCLINE 100 MG: 100 INJECTION, POWDER, LYOPHILIZED, FOR SOLUTION INTRAVENOUS at 15:54

## 2022-11-14 RX ADMIN — AZTREONAM 2000 MG: 2 INJECTION, POWDER, LYOPHILIZED, FOR SOLUTION INTRAMUSCULAR; INTRAVENOUS at 14:48

## 2022-11-14 RX ADMIN — IPRATROPIUM BROMIDE 0.5 MG: 0.5 SOLUTION RESPIRATORY (INHALATION) at 00:16

## 2022-11-14 RX ADMIN — LEVALBUTEROL HYDROCHLORIDE 0.63 MG: 1.25 SOLUTION, CONCENTRATE RESPIRATORY (INHALATION) at 19:50

## 2022-11-14 RX ADMIN — GUAIFENESIN 600 MG: 600 TABLET, EXTENDED RELEASE ORAL at 20:46

## 2022-11-14 RX ADMIN — LEVALBUTEROL HYDROCHLORIDE 0.63 MG: 1.25 SOLUTION, CONCENTRATE RESPIRATORY (INHALATION) at 00:14

## 2022-11-14 RX ADMIN — SODIUM CHLORIDE, PRESERVATIVE FREE 10 ML: 5 INJECTION INTRAVENOUS at 09:42

## 2022-11-14 RX ADMIN — ACETAMINOPHEN 650 MG: 325 TABLET ORAL at 22:35

## 2022-11-14 RX ADMIN — GUAIFENESIN 600 MG: 600 TABLET, EXTENDED RELEASE ORAL at 09:41

## 2022-11-14 ASSESSMENT — PAIN SCALES - GENERAL: PAINLEVEL_OUTOF10: 4

## 2022-11-14 ASSESSMENT — PAIN DESCRIPTION - DESCRIPTORS: DESCRIPTORS: ACHING

## 2022-11-14 ASSESSMENT — PAIN DESCRIPTION - LOCATION: LOCATION: BACK

## 2022-11-14 NOTE — PROGRESS NOTES
Physician Progress Note      Chanell Dye  CSN #:                  [id number]  :                       1965  ADMIT DATE:       2022 2:08 PM  [address] [place] DATE:  RESPONDING  PROVIDER #:        Pamela Wong MD          QUERY TEXT:    Pt admitted with \"possible pneumonitis vs early aspiration pneumonia/acute   respiratory failure\"  If possible, please document in the progress notes and   discharge summary if you are evaluating and /or treating any of the following: The medical record reflects the following:  Risk Factors: asthma, Vocal cord dysfcn  Clinical Indicators: Temp- 98.9-103.0   HR-104,  procal was slightly up-.23,   WBC 6.9 \"Acute febrile illness repeat am cxr (noted mild left basilar   atelectasis and subtle opacities throughout both lungs suggestive of atyp   pna)\"  Treatment: started?on doxycycline? q12h/aztreonam?q8h-albuterol?nebs-on   home? Prn xopenex-bcx?ordered in ER, mucinex started-given gentle ivfs?-pulm   consulted given fevers while on abx, apprec recs, serial labs, supportive care    Thank-You, Trudy Robertson RN, BSN, CCDS  Options provided:  -- Evolving Sepsis due to possible aspiration pneumonia/atypical present on   arrival  -- Sepsis, developed following admission  -- Other - I will add my own diagnosis  -- Disagree - Not applicable / Not valid  -- Disagree - Clinically unable to determine / Unknown  -- Refer to Clinical Documentation Reviewer    PROVIDER RESPONSE TEXT:    This patient has evolving sepsis due to possible aspiration pna/atypical which   was present on arrival.    Query created by: Breezy Mcconnell on 2022 11:41 AM      Electronically signed by:  Pamela Wong MD 2022 2:05 PM

## 2022-11-14 NOTE — CARE COORDINATION
CASE MANAGEMENT INITIAL ASSESSMENT      Reviewed chart and completed assessment with patient:bedside  Family present: none  Explained Case Management role/services. yes    Primary contact information:[place]    Health Care Decision Maker :   Primary Decision Maker: Daniel Estrada Spouse - [phone]          Can this person be reached and be able to respond quickly, such as within a few minutes or hours? Yes      Admit date/status:11/1/22/ Inp  Diagnosis:Hyponatremia   Is this a Readmission?:  No      Insurance:BCBS   Precert required for SNF: Yes       3 night stay required: No    Living arrangements, Adls, care needs, prior to admission:Lives with spouse. IPTA, no help with ADL's    Durable Medical Equipment at home:  Walker__Cane__RTS__ BSC__Shower Chair__  02__ HHN__ CPAP__  BiPap__  Hospital Bed__ W/C___ Other_____    Services in the home and/or outpatient, prior to admission:none    Current PCP:none- sees her specialists but looking for a PCP, offered a list patient declined        Medications: Prescription coverage? Yes     Transportation needs: none         PT/OT recs:none    Hospital Exemption Notification (HEN):needed for SNF    Barriers to discharge:new O2 needs? Plan/comments:Patient lives in house with spouse, 2 story home. Patient can stay on couch on main floor if need for now. Has had O2 in the past and used [facility] to supply it. Patient IPTA and denies any needs at this time. Patient still drives and has no issues getting to and from doc appts and getting needed medications. Will continue to follow.      Joanie Campbell RN

## 2022-11-14 NOTE — PROGRESS NOTES
Hospitalist Progress Note      PCP: No primary care provider on file. Date of Admission: 11/11/2022    Chief Complaint: dyspnea       Subjective:  dyspnea has improved. She is frustrated with her ongoing oxygen requirement. Medications:  Reviewed    Infusion Medications    sodium chloride 25 mL/hr (11/13/22 1543)     Scheduled Medications    doxycycline (VIBRAMYCIN) IV  100 mg IntraVENous Q12H    ipratropium  0.5 mg Nebulization q8h    levalbuterol  0.63 mg Nebulization Q8H    aztreonam (AZACTAM) 2000 mg IVPB mini-bag  2,000 mg IntraVENous 3 times per day    fexofenadine  180 mg Oral Daily    sodium chloride flush  5-40 mL IntraVENous 2 times per day    enoxaparin  40 mg SubCUTAneous Daily    guaiFENesin  600 mg Oral BID     PRN Meds: albuterol, sodium chloride, levalbuterol, ZOLMitriptan, sodium chloride flush, sodium chloride, ondansetron **OR** ondansetron, polyethylene glycol, acetaminophen **OR** acetaminophen    No intake or output data in the 24 hours ending 11/14/22 1214    Physical Exam Performed:    /71   Pulse 89   Temp 98.4 °F (36.9 °C) (Oral)   Resp 20   Ht 5' 5\" (1.651 m)   Wt 164 lb (74.4 kg)   LMP 11/02/2016 Comment: prior to this one, it was 2 years ago  SpO2 92%   BMI 27.29 kg/m²     General appearance: No apparent distress, appears stated age and cooperative. HEENT: Pupils equal, round, and reactive to light. Conjunctivae/corneas clear. Neck: Supple, with full range of motion. No jugular venous distention. Trachea midline. Respiratory:  Normal respiratory effort. Clear to auscultation, bilaterally without Rales/Wheezes/Rhonchi. Cardiovascular: Regular rate and rhythm with normal S1/S2 without murmurs, rubs or gallops. Abdomen: Soft, non-tender, non-distended with normal bowel sounds. Musculoskeletal: No clubbing, cyanosis or edema bilaterally. Full range of motion without deformity. Skin: Skin color, texture, turgor normal.  No rashes or lesions.   Neurologic: Neurovascularly intact without any focal sensory/motor deficits. Cranial nerves: II-XII intact, grossly non-focal.  Psychiatric: Alert and oriented, thought content appropriate, normal insight  Capillary Refill: Brisk, 3 seconds, normal   Peripheral Pulses: +2 palpable, equal bilaterally       Labs:   Recent Labs     11/11/22  1542 11/12/22  0618   WBC 6.9 5.4   HGB 14.3 13.7   HCT 43.5 40.2    196     Recent Labs     11/11/22  1542 11/12/22  0618   * 137   K 4.0 3.8   CL 96* 103   CO2 21 24   BUN 9 9   CREATININE 0.6 0.6   CALCIUM 9.1 8.4     Recent Labs     11/11/22  1542   AST 52*   ALT 42*   BILITOT 0.5   ALKPHOS 59     No results for input(s): INR in the last 72 hours. No results for input(s): Porfiriomarshall Stanley in the last 72 hours. Urinalysis:      Lab Results   Component Value Date/Time    NITRU Negative 11/11/2022 03:14 PM    WBCUA None seen 11/11/2022 03:14 PM    RBCUA 0-2 11/11/2022 03:14 PM    BLOODU TRACE-INTACT 11/11/2022 03:14 PM    SPECGRAV <=1.005 11/11/2022 03:14 PM    GLUCOSEU Negative 11/11/2022 03:14 PM       Radiology:  XR CHEST (2 VW)   Preliminary Result   Stable examination with mild left basilar atelectasis and subtle interstitial   patchy opacities throughout both lungs that may represent atypical pneumonia. XR CHEST PORTABLE   Final Result   Patchy left perihilar airspace disease may indicate an evolving pneumonia. Close radiographic follow-up recommended to ensure resolution. Assessment/Plan:    Active Hospital Problems    Diagnosis     SOB (shortness of breath) [R06.02]      Priority: Medium    Laryngospasms [J38.5]      Priority: Medium    Hypoxia [R09.02]      Priority: Medium    Aspiration into airway [T17.908A]     Vocal cord dysfunction [J38.3]     Former smoker [Z87.891]     RAD (reactive airway disease) [J45.909]        \"55 y.o. female with vocal cord dysfcn, asthma who presented to [facility] with concerns for sob.  Pt had possible aspiration event on Wed around mid-morning where she inhaled saliva. She then noted gradually worsening sob(baseline o2 is around 94%). She then dropped to 88% and noted inc'd DORADO. She noted low fever of 99.9 today. She had coughing spell and expectorated brownish sputum. \"      Suspected aspiration pneumonia, with slight procalcitonin elevation and fever to 103. 1. Empiric abx, limited by allergy list.  SLP consulted. Asthma. Inhaled bronchodilators. She quit smoking in 2000. Follows with Dr. Edith Libman at [facility]. Does not tolerate steroids. Vocal cord dysfunction. Supportive care. Acute hypoxic respiratory failure, due to above issues. Wean to RA as tolerated. The patient has no supplemental O2 requirement at baseline. DVT Prophylaxis: enoxaparin  Diet: ADULT DIET; Regular  Code Status: Full Code  PT/OT Eval Status: not indicated    Dispo - ideally when she is weaned to RA. Perhaps 11/16. She lives at home.      Appropriate for A1 Discharge Unit: No      Karyna Rodriguez MD

## 2022-11-14 NOTE — PLAN OF CARE
Problem: SLP Adult - Impaired Swallowing  Goal: By Discharge: Advance to least restrictive diet without signs or symptoms of aspiration for planned discharge setting. See evaluation for individualized goals. Note: Evaluation completed. Report filed. Yosi Ferrara MS CCC-SLP  Speech Language Pathologist

## 2022-11-14 NOTE — PROGRESS NOTES
INPATIENT PULMONARY CRITICAL CARE PROGRESS NOTE      Reason for visit: Asthma/reactive airways disease with suspected episode of aspirating saliva with aspiration pneumonia. CT chest with scattered mucous plugs, especially in the left lower lobe. Bilateral patchy groundglass opacity, confluent left lower lobe airspace disease. Acute febrile illness. History of VCD. History of esophageal web status post dilatation 5 years ago. History of migraine, cervical spine and sinus surgeries. Former smoker with 5.5-pack-year history, quit 20 years ago. History of multiple drug allergies. Followed by Dr. Bryon Blank at [address]: The patient remains afebrile, SaO2 95% on 2 LPM O2. She says she is feeling better, does have some cough. She has intermittent dysphagia. She is followed by Dr. Dick Galicia at [facility] for her vocal cord dysfunction. She mentioned she has several episodes of aspiration, but is usually able to get over it. She says she has had several episodes of \"pneumonia\". This time she tried to treat herself with the nebulizer but could not get better with regards to her oxygen saturation. She has no other complaints. Physical Exam:  Blood pressure 107/67, pulse 87, temperature 98.2 °F (36.8 °C), temperature source Oral, resp. rate 16, height 5' 5\" (1.651 m), weight 164 lb (74.4 kg), last menstrual period 11/02/2016, SpO2 95 %.'   Constitutional: Very pleasant, averagely built, in no acute distress. HENT:  Oropharynx is clear and moist. No oral lesions. No hoarse voice or stridor  Eyes:  Conjunctivae are normal.No scleral icterus. Neck: No JVD. No tracheal deviation present. No obvious thyroid mass. Cardiovascular: Normal rate, regular rhythm, normal heart sounds. No lower extremity edema. Pulmonary/Chest: No wheezes. No rales. No accessory muscle usage or stridor. Abdominal: Deferred. Musculoskeletal: No cyanosis. No clubbing. No obvious joint deformity.    Lymphadenopathy: No cervical Stable examination with mild left basilar atelectasis and subtle interstitial patchy opacities throughout both lungs that may represent atypical pneumonia. XR CHEST PORTABLE    Result Date: 11/11/2022  EXAMINATION: ONE XRAY VIEW OF THE CHEST 11/11/2022 2:19 pm COMPARISON: December 24, 2016. HISTORY: ORDERING SYSTEM PROVIDED HISTORY: shortness of breath TECHNOLOGIST PROVIDED HISTORY: Reason for exam:->shortness of breath Reason for Exam: SOB FINDINGS: The cardiomediastinal silhouette is normal in size. Patchy airspace disease present involving the left perihilar region may indicate an evolving pneumonia. Right lung is clear. No pleural effusion or pneumothorax is present. Patchy left perihilar airspace disease may indicate an evolving pneumonia. Close radiographic follow-up recommended to ensure resolution. Assessment and plan:  Acute respiratory failure, hypoxemic. Oxygen to keep SaO2 >90%. Still requiring supplemental oxygen, could take a couple of days to improve  Pneumonia. Due to allergies placed on doxycycline and Azactam  RAD (reactive airways disease). On ipratropium and levalbuterol. Being followed by Dr. Alma Delia Mukherjee  Vocal cord dysfunction. Being followed by ENT at [facility]. Aspiration into airway, possible mucous plugging. Pulmonary toilet. Barium swallow in the past showing tracheal aspiration with thin liquids. Explained to her that some episodes of aspiration could lead to pneumonitis and not necessarily pneumonia. She has a few episodes every year. Former smoker. No suspicious lesions. DVT prophylaxis.  Lovenox      Discussed with patient, nursing      Electronically signed by:  Donavon Townsend MD    11/14/2022    7:44 AM.

## 2022-11-14 NOTE — PROGRESS NOTES
Speech Language Pathology    Speech Language Pathology  Clinical Bedside Swallow Assessment  Facility/Department: [facility] C5 - MED SURG/ORTHO        Recommendations:  Diet recommendation: IDDSI 7 Regular Solids; IDDSI 0 Thin Liquids - NO straws ; Meds whole in puree  Instrumentation: not indicated at this time, Rec ENT follow up as an outpatient  Risk management: upright for all intake, stay upright for at least 30 mins after intake, no straws, and oral care 2-3x/day to reduce adverse affects in the event of aspiration      Yuliet Park  : 1965 (61 y.o.)   MRN: [id number]  ROOM: [facility]/[id number]  ADMISSION DATE: 2022  PATIENT DIAGNOSIS(ES): Hyponatremia [E87.1]  Hypoxia [R09.02]  Acute aspiration pneumonia [facility]) [J69.0]  Chief Complaint   Patient presents with    Shortness of Breath     Patient states \"she aspirated saliva yesterday\" due to vocal cord dysfunction. Patient with cough, shortness of breath and states had brown phlegm today. Patient with inhalers and nebulizer treatments without improvement; spoke with pulmonologist (advised to come in to ER). Patient states O2 sats at 90% or below with home oximeter.       Patient Active Problem List    Diagnosis Date Noted    SOB (shortness of breath) 2022    Laryngospasms 2022    Hypoxia 2022    CAP (community acquired pneumonia) 2017    Bronchiolitis 2016    Moderate persistent asthma without complication     Vocal cord dysfunction 10/03/2016    Aspiration into airway 10/03/2016    Former smoker 10/21/2015    RAD (reactive airway disease) 10/21/2015    Chronic cough 10/21/2015     Past Medical History:   Diagnosis Date    Asthma     Migraines     Vocal cord dysfunction      Past Surgical History:   Procedure Laterality Date    CERVICAL DISCECTOMY      CERVICAL FUSION  3/29/10    C5,6,7; Sammye Clos    SINUS SURGERY      x2    TUBAL LIGATION       Allergies   Allergen Reactions    Howard Guise [Fluticasone Furoate-Vilanterol] Shortness Of Breath and Other (See Comments)     Difficulty breathing    Penicillins Anaphylaxis    Pulmicort [Budesonide] Shortness Of Breath     Difficulty breathing/asthma attack per patient    Avelox [Moxifloxacin]     Cephalosporins     Clindamycin/Lincomycin      Stomach pain      Codeine Itching    Levaquin [Levofloxacin In D5w]      Joint pains      Lincomycin Hcl      Stomach pain    Prednisone Other (See Comments)     Swelling head hurts neause  Skin hurts    Sulfa Antibiotics Hives    Sulfacetamide Sodium Hives     Breathing problems       DATE ONSET: 11/11/2022    Date of Evaluation: 11/14/2022   Evaluating Therapist: [place], SLP    Chart Reviewed: : [x] Yes [] No    Current Diet: ADULT DIET; Regular, thin liquids    Recent Chest Radiography: [x] Chest XR   [] CT of Chest  Date: 11/11/2022  Impressions   Patchy left perihilar airspace disease may indicate an evolving pneumonia. Close radiographic follow-up recommended to ensure resolution. Pain: denied    Reason for Referral  Sea Miranda was referred for a bedside swallow evaluation to assess the efficiency of their swallow function, identify signs and symptoms of aspiration and make recommendations regarding safe dietary consistencies, effective compensatory strategies, and safe eating environment. Assessment    Medical record review/interview: Per Dr John Chandler, pulmonology progress note today: \"Asthma/reactive airways disease with suspected episode of aspirating saliva with aspiration pneumonia. CT chest with scattered mucous plugs, especially in the left lower lobe. Bilateral patchy groundglass opacity, confluent left lower lobe airspace disease. Acute febrile illness. History of VCD. History of esophageal web status post dilatation 5 years ago. History of migraine, cervical spine and sinus surgeries. Former smoker with 5.5-pack-year history, quit 20 years ago. History of multiple drug allergies. Followed by Dr. Ese Quintero at [facility]\"      Predisposing dysphagia risk factors: Hx esophageal web  Clinical signs of possible chronic dysphagia: hx of aspiration, Hx esophageal web  Precipitating dysphagia risk factors: increased O2 demands    Patient Complaints:  Odynophagia: [] Yes [x] No  Globus Sensation: [] Yes [x] No  Pt did have persistent globus and needed to swallow with effort prior to resolution of esophageal web 5 years ago. She does not feel this sensation today. SOB with PO intake: [x] Yes [] No  Increased WOB with PO intake: [x] Yes [] No  Reflux Sx's: [] Yes [x] No  Weight loss: [] Yes [x] No  Coughing/Choking with PO intake: [] Yes [x] No  Reduced Appetite: [] Yes [x] No    Additional Reported Symptoms/Complaints/Hospital Course: episode of \"choking on my saliva\" 2 days ago, does not wear O2 at baseline but now needing 2L to keep SaO2 at 92%. Chronic cough is acutely exacerbated, dry and tight cough in isolation of PO and productive. Pt habitually sips water across the day. She has avoided straws since prior MBS (2016) revealed aspiration when she swallowed thin liquids successively by straw. Pt also found to aspirate thin liquids when swallowed with a tablet during that MBS, but had been swallowing them with water prior to admission. Vitals/labs:   SpO2: 92%  RR: 18  BP: 114/71  HR: 95  O2 device: NC, 2L    CBC:   Recent Labs     11/12/22  0618   WBC 5.4   HGB 13.7         BMP:  Recent Labs     11/12/22  0618      K 3.8      CO2 24   BUN 9   CREATININE 0.6   GLUCOSE 111*          Cranial nerve exam:   CN V (trigeminal): ophthalmic, maxillary, and mandibular facial sensation- WNL b/l  CN VII (facial): WNL b/l  CN IX/X (glossopharyngeal/vagus): MPT: Reduced and hoarse; pitch range: Reduced and hoarse; vocal quality: hoarse; cough: Dry  CN XII (hypoglossal):  WNL b/l    Laryngeal function exam:   Secretions: WFL  Vocal quality: See CN exam above  MPT: See CN exam above  S/Z ratio: WNL  Pitch range: See CN exam above  Cough: See CN exam above    Oral Care Status:    [x] Oral Care [facility]  [] Poor oral care status  [] Edentulous  [] Upper Dentures  [] Lower Dentures  [] Missing/Broken Teeth  [] Evidence of dental cavities/carries    PO trials:   IDDSI 0 (thin): single sips from the cup, swallow grossly coordinated, no aspiration symptoms       IDDSI 4 (puree): functional oral control and clearance, denied globus, no aspiration symptoms      IDDSI 7 (regular): functional mastication, single swallow per bolus, independently alternated each bite with sip of water, no aspiration symptoms    3 oz water: PASS, small sips from the cup    Impressions: No overt s/s aspiration appreciated with thin liquid by cup sips, puree, fresh fruit. Pt with dry cough in isolation of PO consistent with her baseline cough but acutely increased in frequency. Pt with known VCD hx and laryngospasms but has had not had difficulty swallowing over the last ~2 years. She was followed by ENT >2 years ago. Recommendations:  Diet recommendation: IDDSI 7 Regular Solids; IDDSI 0 Thin Liquids - NO straws ;  Meds whole in puree  Instrumentation: not indicated at this time, Rec ENT follow up as an outpatient  Risk management: upright for all intake, stay upright for at least 30 mins after intake, no straws, and oral care 2-3x/day to reduce adverse affects in the event of aspiration    Prognosis: Good    Recommended Intervention:   [] Dysphagia tx  [] Videostroboscopy                      [] NPO   [] MBS       [] Speech/Cog Eval    [] Therapeutic PO Trials     [] Ice Chips   [] Other:  [] FEES                                                 Dysphagia Therapeutic Intervention:   []  Bolus control Exercises  []  Oral Motor Exercises  []  [employer] Protocol  []  Thermal Stimulation  [x]  Oral Care    []  Vital Stim/NMES  []  Laryngeal Exercises  [x]  Patient/Family Education  []  Pharyngeal Exercises  []  Therapeutic PO trials with SLP  []  Diet tolerance monitoring  [x]  Other: controlled exhalation/cough exercises    Referrals:  [x] ENT    [] PT  [] Pulmonology [] GI  [] Neurology  [] RD  [] OT   []     Goals:  Short Term Goals:  Timeframe for Short Term Goals: (5 days 11/19/22)  Goal 1: The patient will recall/perform recommended compensatory strategies  independently (no straw, maintain oral hygiene, pills whole with puree, upright during and after PO)  Goal 2: Pt will complete 5-10 reps of controlled exhalation exercises/rescue breathing for VCD/laryngospasms with min cues      Long Term Goals:   Timeframe for Long Term Goals: (7 days 11/21/22)  Goal 1: The patient will tolerate least restrictive diet with no clinical s/s of aspiration or worsening respiratory/pulmonary status    Treatment:  Skilled instruction completed with patient re: evidenced based practice regarding recommendations and POC, importance of oral care to reduce adverse affects in the event of aspiration, and instruction of recommended compensatory strategies developed based upon clinical exam. Pt able to recall/demonstrate compensatory strategies independently. Pt completed controlled exhalation exercises to recover from vocal cord dysfunction \"attacks\". Pt familiar with these from ENT visit 2 years ago when being followed for esophageal web, laryngospasms and VCD:   -Diaphragmatic inhale with slow controlled /s/ on exhalation: pt maintained X4-5 seconds across 8 reps with cough to follow in 2 of 8 trials.  Pt visibly fatigued and dyspneic but recovered with brief rest.  -Open throat (yawn) with short, paused exhale on \"ha ha ha\" X5 reps with mod cues/models      Pt Education: SLP educated the patient re: Role of SLP, rationale for completion of assessment, anatomical components of swallow structures as they pertain to airway protection, results of assessment, recommendations, and POC  Pt Education Response: verbalized understanding and demonstrated understanding    Duration/Frequency of Tx: 2-3x/wk    Individuals Consulted:   [x]  Patient     []  NP         [x]  RN   []  RD                   []  MD      []  Family Member                        []  PA    []  Other:      Safety Devices / Report:  []  All fall risk precautions in place []  Safety handoff completed with RN  []  Bed alarm in place  [x]  Left in bed     []  Chair alarm in place  []  Left in chair   [x]  Call light in reach   []  Other: Total Treatment Time / Charges       Time in Time out Total Time / units   Swallow Eval/Tx Time  0830 0900 20min/ 1 unit eval  10 min/ 1 unit tx     Signature: Kevin Ferrara MS CCC-SLP  Speech Language Pathologist

## 2022-11-15 VITALS
DIASTOLIC BLOOD PRESSURE: 62 MMHG | WEIGHT: 164 LBS | HEART RATE: 77 BPM | SYSTOLIC BLOOD PRESSURE: 109 MMHG | RESPIRATION RATE: 16 BRPM | OXYGEN SATURATION: 95 % | HEIGHT: 65 IN | BODY MASS INDEX: 27.32 KG/M2 | TEMPERATURE: 98.1 F

## 2022-11-15 LAB
BLOOD CULTURE, ROUTINE: NORMAL
CULTURE, BLOOD 2: NORMAL

## 2022-11-15 PROCEDURE — 2500000003 HC RX 250 WO HCPCS: Performed by: INTERNAL MEDICINE

## 2022-11-15 PROCEDURE — 2580000003 HC RX 258: Performed by: INTERNAL MEDICINE

## 2022-11-15 PROCEDURE — 99232 SBSQ HOSP IP/OBS MODERATE 35: CPT | Performed by: INTERNAL MEDICINE

## 2022-11-15 PROCEDURE — 6360000002 HC RX W HCPCS: Performed by: NURSE PRACTITIONER

## 2022-11-15 PROCEDURE — 6360000002 HC RX W HCPCS: Performed by: INTERNAL MEDICINE

## 2022-11-15 PROCEDURE — 94640 AIRWAY INHALATION TREATMENT: CPT

## 2022-11-15 PROCEDURE — 6370000000 HC RX 637 (ALT 250 FOR IP): Performed by: INTERNAL MEDICINE

## 2022-11-15 PROCEDURE — 94669 MECHANICAL CHEST WALL OSCILL: CPT

## 2022-11-15 PROCEDURE — 2700000000 HC OXYGEN THERAPY PER DAY

## 2022-11-15 RX ORDER — DOXYCYCLINE HYCLATE 100 MG
100 TABLET ORAL 2 TIMES DAILY
Qty: 6 TABLET | Refills: 0 | Status: SHIPPED | OUTPATIENT
Start: 2022-11-15 | End: 2022-11-18

## 2022-11-15 RX ADMIN — IPRATROPIUM BROMIDE 0.5 MG: 0.5 SOLUTION RESPIRATORY (INHALATION) at 16:10

## 2022-11-15 RX ADMIN — DOXYCYCLINE 100 MG: 100 INJECTION, POWDER, LYOPHILIZED, FOR SOLUTION INTRAVENOUS at 04:11

## 2022-11-15 RX ADMIN — IPRATROPIUM BROMIDE 0.5 MG: 0.5 SOLUTION RESPIRATORY (INHALATION) at 12:32

## 2022-11-15 RX ADMIN — IPRATROPIUM BROMIDE 0.5 MG: 0.5 SOLUTION RESPIRATORY (INHALATION) at 08:36

## 2022-11-15 RX ADMIN — ALBUTEROL SULFATE 2.5 MG: 2.5 SOLUTION RESPIRATORY (INHALATION) at 06:20

## 2022-11-15 RX ADMIN — ENOXAPARIN SODIUM 40 MG: 100 INJECTION SUBCUTANEOUS at 08:37

## 2022-11-15 RX ADMIN — LEVALBUTEROL HYDROCHLORIDE 0.63 MG: 1.25 SOLUTION, CONCENTRATE RESPIRATORY (INHALATION) at 16:10

## 2022-11-15 RX ADMIN — SODIUM CHLORIDE 25 ML: 9 INJECTION, SOLUTION INTRAVENOUS at 06:05

## 2022-11-15 RX ADMIN — LEVALBUTEROL HYDROCHLORIDE 0.63 MG: 1.25 SOLUTION, CONCENTRATE RESPIRATORY (INHALATION) at 12:33

## 2022-11-15 RX ADMIN — AZTREONAM 2000 MG: 2 INJECTION, POWDER, LYOPHILIZED, FOR SOLUTION INTRAMUSCULAR; INTRAVENOUS at 06:10

## 2022-11-15 RX ADMIN — LEVALBUTEROL HYDROCHLORIDE 0.63 MG: 1.25 SOLUTION, CONCENTRATE RESPIRATORY (INHALATION) at 08:37

## 2022-11-15 RX ADMIN — SODIUM CHLORIDE, PRESERVATIVE FREE 10 ML: 5 INJECTION INTRAVENOUS at 08:38

## 2022-11-15 RX ADMIN — ACETAMINOPHEN 650 MG: 325 TABLET ORAL at 13:58

## 2022-11-15 RX ADMIN — SODIUM CHLORIDE: 9 INJECTION, SOLUTION INTRAVENOUS at 04:10

## 2022-11-15 NOTE — PROGRESS NOTES
Hospitalist Progress Note      PCP: No primary care provider on file. Date of Admission: 11/11/2022    Chief Complaint: dyspnea       Subjective:  dyspnea has improved. She is frustrated with her ongoing oxygen requirement. She is thinking about just going home with oxygen at this point, wants to wait and see how the day goes. Medications:  Reviewed    Infusion Medications    sodium chloride 25 mL (11/15/22 0605)     Scheduled Medications    doxycycline (VIBRAMYCIN) IV  100 mg IntraVENous Q12H    levalbuterol  0.63 mg Nebulization Q4H While awake    ipratropium  0.5 mg Nebulization Q4H WA    aztreonam (AZACTAM) 2000 mg IVPB mini-bag  2,000 mg IntraVENous 3 times per day    fexofenadine  180 mg Oral Daily    sodium chloride flush  5-40 mL IntraVENous 2 times per day    enoxaparin  40 mg SubCUTAneous Daily    guaiFENesin  600 mg Oral BID     PRN Meds: albuterol, sodium chloride, levalbuterol, ZOLMitriptan, sodium chloride flush, sodium chloride, ondansetron **OR** ondansetron, polyethylene glycol, acetaminophen **OR** acetaminophen    No intake or output data in the 24 hours ending 11/15/22 0930    Physical Exam Performed:    /62   Pulse 87   Temp 98.1 °F (36.7 °C) (Oral)   Resp 18   Ht 5' 5\" (1.651 m)   Wt 164 lb (74.4 kg)   LMP 11/02/2016 Comment: prior to this one, it was 2 years ago  SpO2 96%   BMI 27.29 kg/m²     General appearance: No apparent distress, appears stated age and cooperative. HEENT: Pupils equal, round, and reactive to light. Conjunctivae/corneas clear. Neck: Supple, with full range of motion. No jugular venous distention. Trachea midline. Respiratory:  Normal respiratory effort. Clear to auscultation, bilaterally without Rales/Wheezes/Rhonchi. Cardiovascular: Regular rate and rhythm with normal S1/S2 without murmurs, rubs or gallops. Abdomen: Soft, non-tender, non-distended with normal bowel sounds. Musculoskeletal: No clubbing, cyanosis or edema bilaterally. Full range of motion without deformity. Skin: Skin color, texture, turgor normal.  No rashes or lesions. Neurologic:  Neurovascularly intact without any focal sensory/motor deficits. Cranial nerves: II-XII intact, grossly non-focal.  Psychiatric: Alert and oriented, thought content appropriate, normal insight  Capillary Refill: Brisk, 3 seconds, normal   Peripheral Pulses: +2 palpable, equal bilaterally       Labs:   No results for input(s): WBC, HGB, HCT, PLT in the last 72 hours. No results for input(s): NA, K, CL, CO2, BUN, CREATININE, CALCIUM, PHOS in the last 72 hours. Invalid input(s): MAGNES    No results for input(s): AST, ALT, BILIDIR, BILITOT, ALKPHOS in the last 72 hours. No results for input(s): INR in the last 72 hours. No results for input(s): Emile Lory in the last 72 hours. Urinalysis:      Lab Results   Component Value Date/Time    NITRU Negative 11/11/2022 03:14 PM    WBCUA None seen 11/11/2022 03:14 PM    RBCUA 0-2 11/11/2022 03:14 PM    BLOODU TRACE-INTACT 11/11/2022 03:14 PM    SPECGRAV <=1.005 11/11/2022 03:14 PM    GLUCOSEU Negative 11/11/2022 03:14 PM       Radiology:  XR CHEST (2 VW)   Final Result   Stable examination with mild left basilar atelectasis and subtle interstitial   patchy opacities throughout both lungs that may represent atypical pneumonia. XR CHEST PORTABLE   Final Result   Patchy left perihilar airspace disease may indicate an evolving pneumonia. Close radiographic follow-up recommended to ensure resolution.                  Assessment/Plan:    Active Hospital Problems    Diagnosis     Acute respiratory failure with hypoxia ([address] [J96.01]      Priority: Medium    Acute aspiration pneumonia ([address] [J69.0]      Priority: Medium    SOB (shortness of breath) [R06.02]      Priority: Medium    Laryngospasms [J38.5]      Priority: Medium    Hypoxia [R09.02]      Priority: Medium    Aspiration of foreign body [K54.903D]     Vocal cord dysfunction [J38.3]     Former smoker [E87.779]     RAD (reactive airway disease) [J45.909]        \"55 y.o. female with vocal cord dysfcn, asthma who presented to [facility] with concerns for sob. Pt had possible aspiration event on Wed around mid-morning where she inhaled saliva. She then noted gradually worsening sob(baseline o2 is around 94%). She then dropped to 88% and noted inc'd DORADO. She noted low fever of 99.9 today. She had coughing spell and expectorated brownish sputum. \"      Suspected aspiration pneumonia, with slight procalcitonin elevation and fever to 103. 1. Empiric abx, limited by allergy list.  SLP consulted. Asthma. Inhaled bronchodilators. She quit smoking in 2000. Follows with Dr. Michael Cardona at [facility]. Does not tolerate steroids. Vocal cord dysfunction. Supportive care. Acute hypoxic respiratory failure, due to above issues. Wean to RA as tolerated. The patient has no supplemental O2 requirement at baseline. She says that she did require home oxygen in the remote past, but was taken off 6 years ago because she was told she didn't need it anymore. DVT Prophylaxis: enoxaparin  Diet: ADULT DIET; Regular  Code Status: Full Code  PT/OT Eval Status: not indicated    Dispo - ideally when she is weaned to RA. Perhaps 11/16. She lives at home.      Appropriate for A1 Discharge Unit: Aliya Goff MD

## 2022-11-15 NOTE — PROGRESS NOTES
Patient given discharge instructions. All questions and concerns were addressed. IV and tele removed per protocol. Patient wheeled to car with all patient belongings.

## 2022-11-15 NOTE — PROGRESS NOTES
Patient: Sydni Guadalupe MRN: [id number]  Date of  Admission: 11/11/2022   YOB: 1965  Age: 61 y.o.   Sex: female    Unit: [place] [address] MED SURG/ORTHO  Room/Bed: [id number] Admitting Physician:     Attending Physician:  Jeannie Johnson MD         Pulmonary Service Note      SUBJECTIVE:    Patient reports that she is been placed on oxygen in the past but was able to wean off  She is slightly frustrated because she is not able to come off the oxygen  She follows with Dr. Ese Quintero at [facility]  Has vocal cord issues  Has been recently seen by [facility] and is due to have a procedure, upper airway examination      ROS:  A comprehensive review of systems was negative except for: Above    OBJECTIVE    Medications    Continuous Infusions:   sodium chloride 25 mL (11/15/22 0605)       Scheduled Meds:   doxycycline (VIBRAMYCIN) IV  100 mg IntraVENous Q12H    levalbuterol  0.63 mg Nebulization Q4H While awake    ipratropium  0.5 mg Nebulization Q4H WA    aztreonam (AZACTAM) 2000 mg IVPB mini-bag  2,000 mg IntraVENous 3 times per day    fexofenadine  180 mg Oral Daily    sodium chloride flush  5-40 mL IntraVENous 2 times per day    enoxaparin  40 mg SubCUTAneous Daily    guaiFENesin  600 mg Oral BID       PRN Meds:  albuterol, sodium chloride, levalbuterol, ZOLMitriptan, sodium chloride flush, sodium chloride, ondansetron **OR** ondansetron, polyethylene glycol, acetaminophen **OR** acetaminophen    Physical    Patient Vitals for the past 24 hrs:   BP Temp Temp src Pulse Resp SpO2   11/15/22 0846 109/62 98.1 °F (36.7 °C) Oral 87 18 96 %   11/15/22 0836 -- -- -- 81 20 96 %   11/15/22 0620 -- -- -- -- -- 93 %   11/15/22 0400 114/71 98 °F (36.7 °C) Oral 72 18 93 %   11/14/22 2315 102/65 98.8 °F (37.1 °C) Oral 82 18 95 %   11/14/22 2030 114/72 98.9 °F (37.2 °C) Oral 92 20 97 %   11/14/22 2015 -- -- -- 87 -- --   11/14/22 1950 -- -- -- 87 20 97 %   11/14/22 1504 106/70 98.6 °F (37 °C) Oral 79 18 94 % Physical Exam  Vitals and nursing note reviewed. Constitutional:       General: She is not in acute distress. Appearance: Normal appearance. HENT:      Head: Normocephalic and atraumatic. Right Ear: External ear normal.      Left Ear: External ear normal.      Nose: Nose normal. No congestion. Mouth/Throat:      Mouth: Mucous membranes are moist.      Pharynx: Oropharynx is clear. Eyes:      Extraocular Movements: Extraocular movements intact. Conjunctiva/sclera: Conjunctivae normal.      Pupils: Pupils are equal, round, and reactive to light. Cardiovascular:      Rate and Rhythm: Normal rate and regular rhythm. Pulses: Normal pulses. Heart sounds: Normal heart sounds. No murmur heard. Pulmonary:      Effort: Pulmonary effort is normal. No respiratory distress. Breath sounds: Wheezing present. No rales. Comments: Inspiratory wheezing  Abdominal:      General: Abdomen is flat. Bowel sounds are normal. There is no distension. Palpations: Abdomen is soft. There is no mass. Tenderness: There is no abdominal tenderness. There is no right CVA tenderness, left CVA tenderness, guarding or rebound. Hernia: No hernia is present. Musculoskeletal:         General: No swelling. Normal range of motion. Cervical back: Normal range of motion. No rigidity. Skin:     General: Skin is warm and dry. Coloration: Skin is not jaundiced or pale. Neurological:      General: No focal deficit present. Mental Status: She is alert and oriented to person, place, and time. Mental status is at baseline. Cranial Nerves: No cranial nerve deficit. Motor: No weakness. Psychiatric:         Mood and Affect: Mood normal.         Behavior: Behavior normal.         Thought Content: Thought content normal.         Judgment: Judgment normal.            Weight  Weight change:       Labs:   No results for input(s): WBC, HGB, HCT, PLT in the last 72 hours.    No Xopenex  Unable to tolerate steroids or steroid inhalers  Has been seen by an allergist in the past  Does follow with Dr. Fabien Rome at [facility], [address] [place] [address] Pulmonary, [address] and Sleep Medicine  [phone]      Please note that some or all of this record was generated using voice recognition software. If there are any questions about the content of this document, please contact the author as some errors in transcription may have occurred.

## 2022-11-15 NOTE — CARE COORDINATION
Writer notes DC order. Patient denies any needs, on Room air now so no need for home O2 at this point.      Antony Prather RN

## 2022-11-15 NOTE — DISCHARGE SUMMARY
Hospital Medicine Discharge Summary    Patient ID: John Phan      Patient's PCP: No primary care provider on file. Admit Date: 11/11/2022     Discharge Date:   11/15/22     Admitting Provider: No admitting provider for patient encounter. Discharge Provider: Hussain Leroy MD     Discharge Diagnoses: Active Hospital Problems    Diagnosis     Acute respiratory failure with hypoxia (HCC) [J96.01]      Priority: Medium    Acute aspiration pneumonia ([address] [J69.0]      Priority: Medium    SOB (shortness of breath) [R06.02]      Priority: Medium    Laryngospasms [J38.5]      Priority: Medium    Hypoxia [R09.02]      Priority: Medium    Aspiration of foreign body [H04.770M]     Vocal cord dysfunction [J38.3]     Former smoker [Z87.891]     RAD (reactive airway disease) [J45.909]        The patient was seen and examined on day of discharge and this discharge summary is in conjunction with any daily progress note from day of discharge. Hospital Course:  \"55 y.o. female with vocal cord dysfcn, asthma who presented to [address] [place] [address] with concerns for sob. Pt had possible aspiration event on Wed around mid-morning where she inhaled saliva. She then noted gradually worsening sob(baseline o2 is around 94%). She then dropped to 88% and noted inc'd DORADO. She noted low fever of 99.9 today. She had coughing spell and expectorated brownish sputum. \"        Suspected aspiration pneumonia, with slight procalcitonin elevation and fever to 103. 1. Empiric abx, limited by allergy list, then discharged with another 3 days of doxycycline PO. SLP input was reassuring. Asthma. Inhaled bronchodilators. She quit smoking in 2000. Follows with Dr. Yosef Cesar at [facility]. Does not tolerate steroids. Vocal cord dysfunction. Supportive care. F/u soon with [facility]NT. Acute hypoxic respiratory failure, due to above issues. Weaned to RA. The patient has no supplemental O2 requirement at baseline.   She says that she did require home oxygen in the remote past, but was taken off 6 years ago because she was told she didn't need it anymore. Physical Exam Performed:     /62   Pulse 87   Temp 98.1 °F (36.7 °C) (Oral)   Resp 18   Ht 5' 5\" (1.651 m)   Wt 164 lb (74.4 kg)   LMP 11/02/2016 Comment: prior to this one, it was 2 years ago  SpO2 96%   BMI 27.29 kg/m²       General appearance:  No apparent distress, appears stated age and cooperative. HEENT:  Normal cephalic, atraumatic without obvious deformity. Pupils equal, round, and reactive to light. Extra ocular muscles intact. Conjunctivae/corneas clear. Neck: Supple, with full range of motion. No jugular venous distention. Trachea midline. Respiratory:  Normal respiratory effort. Clear to auscultation, bilaterally without Rales/Wheezes/Rhonchi. Cardiovascular:  Regular rate and rhythm with normal S1/S2 without murmurs, rubs or gallops. Abdomen: Soft, non-tender, non-distended with normal bowel sounds. Musculoskeletal:  No clubbing, cyanosis or edema bilaterally. Full range of motion without deformity. Skin: Skin color, texture, turgor normal.  No rashes or lesions. Neurologic:  Neurovascularly intact without any focal sensory/motor deficits. Cranial nerves: II-XII intact, grossly non-focal.  Psychiatric:  Alert and oriented, thought content appropriate, normal insight  Capillary Refill: Brisk,< 3 seconds   Peripheral Pulses: +2 palpable, equal bilaterally       Labs:  For convenience and continuity at follow-up the following most recent labs are provided:      CBC:    Lab Results   Component Value Date/Time    WBC 5.4 11/12/2022 06:18 AM    HGB 13.7 11/12/2022 06:18 AM    HCT 40.2 11/12/2022 06:18 AM     11/12/2022 06:18 AM       Renal:    Lab Results   Component Value Date/Time     11/12/2022 06:18 AM    K 3.8 11/12/2022 06:18 AM     11/12/2022 06:18 AM    CO2 24 11/12/2022 06:18 AM    BUN 9 11/12/2022 06:18 AM CREATININE 0.6 11/12/2022 06:18 AM    CALCIUM 8.4 11/12/2022 06:18 AM         Significant Diagnostic Studies    Radiology:   XR CHEST (2 VW)   Final Result   Stable examination with mild left basilar atelectasis and subtle interstitial   patchy opacities throughout both lungs that may represent atypical pneumonia. XR CHEST PORTABLE   Final Result   Patchy left perihilar airspace disease may indicate an evolving pneumonia. Close radiographic follow-up recommended to ensure resolution. Consults:     IP CONSULT TO PULMONOLOGY    Disposition:  home     Condition at Discharge: Stable    Discharge Instructions/Follow-up:  Follow up with PCP within 1-2 weeks. Follow up with ENT as planned. Code Status:  Full Code     Activity: activity as tolerated    Diet: regular diet      Discharge Medications:     Current Discharge Medication List             Details   doxycycline hyclate (VIBRA-TABS) 100 MG tablet Take 1 tablet by mouth 2 times daily for 3 days  Qty: 6 tablet, Refills: 0                Details   !! levalbuterol (XOPENEX HFA) 45 MCG/ACT inhaler TAKE 2 PUFFS BY MOUTH EVERY 6 HOURS AS NEEDED FOR WHEEZE  Qty: 15 each, Refills: 6    Comments: PT REQUESTING A REFILL      !! levalbuterol (XOPENEX HFA) 45 MCG/ACT inhaler Inhale 2 puffs into the lungs every 4 hours as needed for Wheezing  Qty: 1 each, Refills: 11      ipratropium (ATROVENT) 0.02 % nebulizer solution Take 2.5 mLs by nebulization 4 times daily DX:J45.40 Asthma  Qty: 360 mL, Refills: 11      !! levalbuterol (XOPENEX HFA) 45 MCG/ACT inhaler INHALE 1 PUFF INTO THE LUNGS EVERY 4 HOURS AS NEEDED FOR WHEEZING  Qty: 15 g, Refills: 2    Associated Diagnoses:  Moderate persistent asthma without complication      fexofenadine (ALLEGRA) 180 MG tablet Take 180 mg by mouth daily      ZOLMitriptan (ZOMIG) 5 MG tablet Take 1 tablet by mouth as needed for Migraine  Qty: 12 tablet, Refills: 1      acetaminophen (TYLENOL) 325 MG tablet Take 650 mg by mouth as needed       !! - Potential duplicate medications found. Please discuss with provider. Time Spent on discharge is more than 30 minutes in the examination, evaluation, counseling and review of medications and discharge plan. Signed:    Karyna Rodriguez MD   11/15/2022      Thank you No primary care provider on file. for the opportunity to be involved in this patient's care. If you have any questions or concerns, please feel free to contact me at [phone].

## 2022-11-18 ENCOUNTER — TELEPHONE (OUTPATIENT)
Dept: PULMONOLOGY | Age: 57
End: 2022-11-18

## 2022-11-18 RX ORDER — LEVALBUTEROL TARTRATE 45 UG/1
2 AEROSOL, METERED ORAL EVERY 6 HOURS PRN
Qty: 1 EACH | Refills: 6 | Status: SHIPPED | OUTPATIENT
Start: 2022-11-18 | End: 2023-11-18

## 2022-11-18 NOTE — TELEPHONE ENCOUNTER
Patient called and needs a refill on medications.      ipratropium (ATROVENT) 0.02 % nebulizer solution    levalbuterol [facility]) 45 MCG/ACT inhaler     [facility] [address] [place] [address]     [address]: [phone]

## 2023-01-31 RX ORDER — LEVALBUTEROL TARTRATE 45 UG/1
AEROSOL, METERED ORAL
Qty: 15 G | Refills: 5 | Status: SHIPPED | OUTPATIENT
Start: 2023-01-31

## 2023-02-08 ENCOUNTER — TELEPHONE (OUTPATIENT)
Dept: PULMONOLOGY | Age: 58
End: 2023-02-08

## 2023-02-08 DIAGNOSIS — J20.9 ACUTE BRONCHITIS, UNSPECIFIED ORGANISM: ICD-10-CM

## 2023-02-08 DIAGNOSIS — J45.40 MODERATE PERSISTENT ASTHMA WITHOUT COMPLICATION: Primary | ICD-10-CM

## 2023-02-08 NOTE — TELEPHONE ENCOUNTER
Pt called and needs refills on both nebulizer machine medicine's     Would also like albuterol for nebulizer.         Send to     [facility] on [address] [place] [address] Dr    Ph # [phone]

## 2023-02-08 NOTE — TELEPHONE ENCOUNTER
Pt stated that when she was in the hospital back in November she was prescribed Albuterol(this was stopped due to heart issues-palpitations tachycardia by Dr Jennifer Dennis) for her nebulizer and she would like to have this filled again to have on hand when she get's in a rough patch. She was exposed to epoxy glue on Monday from a furnace repair and she can feel something coming on-like a bronchitis thing and she does not want to end back up in the hospital. ? If she would benefit from an antibiotic at this time. Starting to struggle with her breathing. She does use the Xopenex/Atrovent that she typically uses daily. Cannot take any steroids.  Pt informed will discuss with Lisa Tinoco in morning and call her back She stated that would be fine

## 2023-02-09 RX ORDER — DOXYCYCLINE HYCLATE 100 MG
100 TABLET ORAL 2 TIMES DAILY
Qty: 14 TABLET | Refills: 0 | Status: SHIPPED | OUTPATIENT
Start: 2023-02-09 | End: 2023-02-16

## 2023-02-09 RX ORDER — LEVALBUTEROL INHALATION SOLUTION 1.25 MG/3ML
1.25 SOLUTION RESPIRATORY (INHALATION) EVERY 6 HOURS PRN
Qty: 360 ML | Refills: 11 | Status: SHIPPED | OUTPATIENT
Start: 2023-02-09

## 2023-02-09 NOTE — TELEPHONE ENCOUNTER
Spoke with pt ad she c/o extra mucus that does wake her up at night, coughing more, using Levalbuterol Inhaler more then she should and tightness in chest

## 2023-02-16 ENCOUNTER — OFFICE VISIT (OUTPATIENT)
Dept: PULMONOLOGY | Age: 58
End: 2023-02-16
Payer: COMMERCIAL

## 2023-02-16 VITALS — HEART RATE: 84 BPM | OXYGEN SATURATION: 94 %

## 2023-02-16 DIAGNOSIS — R05.3 CHRONIC COUGH: ICD-10-CM

## 2023-02-16 DIAGNOSIS — J45.41 MODERATE PERSISTENT ASTHMA WITH ACUTE EXACERBATION: Primary | ICD-10-CM

## 2023-02-16 PROCEDURE — 99213 OFFICE O/P EST LOW 20 MIN: CPT | Performed by: NURSE PRACTITIONER

## 2023-02-16 ASSESSMENT — ENCOUNTER SYMPTOMS
COUGH: 1
SHORTNESS OF BREATH: 1
COLOR CHANGE: 0
ABDOMINAL PAIN: 0
CONSTIPATION: 0

## 2023-02-16 NOTE — PROGRESS NOTES
[facility] Follow Up Note    Subjective:   CHIEF COMPLAINT / HPI:    The patient is 61 y.o. female who presents today for a routine follow up visit related to the above mentioned issues. There is a PMH significant for other conditions including: vocal cord dysfunction, migraines and tobacco abuse. Last evaluation was in 2022 with Dr. Yessy Chamberlain. At that time the patient reported her pulmonary symptoms including chronic cough were stable. Unfortunately she had an episode at the beginning of the month where she was exposed to epoxy glue which set off her asthma. It sounds like she was ultimately treated for a bronchitis with Doxycycline and Mucinex which have improved symptoms. Presently the patient notes wheezing remains despite improvement in cough over all. She is still coughing more than typical but also feels sinus drainage contributes to this. There are no fevers or chills. When cough is productive mucous is typically clear. Historically she has not tolerated systemic, antihistamines or inhaled steroids. She does use Atrovent/Xopenex TID - QID. She also uses nasal Saline pretty much around the clock while awake. She has recently asked for a refill of Albuterol though opted not to use this as she has had heart palpitations with it. Past Medical History:   Diagnosis Date    Asthma     Migraines     Vocal cord dysfunction      History:    Social History     Tobacco Use   Smoking Status Former    Packs/day: 0.50    Years: 11.00    Pack years: 5.50    Types: Cigarettes    Quit date: 2000    Years since quittin.1   Smokeless Tobacco Never     Current Medications:  Current Outpatient Medications on File Prior to Visit   Medication Sig Dispense Refill    levalbuterol (XOPENEX) 1.25 MG/3ML nebulizer solution Take 3 mLs by nebulization every 6 hours as needed for Wheezing DX: Moderate Persistent Asthma J45.40 360 mL 11    ipratropium (ATROVENT) 0.02 % nebulizer solution Take 2.5 mLs by nebulization every 6 hours as needed for Wheezing DX: Moderate Persistent Asthma J45.40 360 mL 11    doxycycline hyclate (VIBRA-TABS) 100 MG tablet Take 1 tablet by mouth 2 times daily for 7 days 14 tablet 0    levalbuterol (XOPENEX HFA) 45 MCG/ACT inhaler INSTILL 2 PUFFS EVERY 4 HOURS AS NEEDED FOR WHEEZING 15 g 5    levalbuterol (XOPENEX HFA) 45 MCG/ACT inhaler Inhale 2 puffs into the lungs every 6 hours as needed for Wheezing 1 each 6    ipratropium (ATROVENT) 0.02 % nebulizer solution Take 2.5 mLs by nebulization 4 times daily DX:Asthma J45.40 360 mL 6    albuterol (PROVENTIL) (5 MG/ML) 0.5% nebulizer solution Take 1 mL by nebulization 4 times daily as needed for Wheezing 120 each 3    levalbuterol (XOPENEX HFA) 45 MCG/ACT inhaler TAKE 2 PUFFS BY MOUTH EVERY 6 HOURS AS NEEDED FOR WHEEZE 15 each 6    levalbuterol (XOPENEX HFA) 45 MCG/ACT inhaler Inhale 2 puffs into the lungs every 4 hours as needed for Wheezing 1 each 11    ipratropium (ATROVENT) 0.02 % nebulizer solution Take 2.5 mLs by nebulization 4 times daily DX:J45.40 Asthma (Patient not taking: Reported on 11/11/2022) 360 mL 11    levalbuterol (XOPENEX HFA) 45 MCG/ACT inhaler INHALE 1 PUFF INTO THE LUNGS EVERY 4 HOURS AS NEEDED FOR WHEEZING 15 g 2    fexofenadine (ALLEGRA) 180 MG tablet Take 180 mg by mouth daily      ZOLMitriptan (ZOMIG) 5 MG tablet Take 1 tablet by mouth as needed for Migraine 12 tablet 1    acetaminophen (TYLENOL) 325 MG tablet Take 650 mg by mouth as needed       No current facility-administered medications on file prior to visit. Review of Systems   Constitutional:  Negative for chills and fever. HENT:  Positive for congestion and postnasal drip. Respiratory:  Positive for cough and shortness of breath. Cardiovascular:  Negative for chest pain and leg swelling. Gastrointestinal:  Negative for abdominal pain and constipation. Musculoskeletal:  Negative for arthralgias and joint swelling.    Skin:  Negative for color change and pallor. Allergic/Immunologic: Positive for environmental allergies. Negative for food allergies. Psychiatric/Behavioral:  Negative for agitation and confusion. Objective:       VITALS:  Pulse 84   LMP 11/02/2016 Comment: prior to this one, it was 2 years ago  SpO2 94% Comment: RA at rest     Physical Exam  Constitutional:       General: She is not in acute distress. Appearance: She is well-developed. She is not ill-appearing. HENT:      Head: Normocephalic. Mouth/Throat:      Pharynx: No oropharyngeal exudate. Eyes:      General:         Right eye: No discharge. Left eye: No discharge. Conjunctiva/sclera: Conjunctivae normal.      Pupils: Pupils are equal, round, and reactive to light. Neck:      Trachea: No tracheal deviation. Cardiovascular:      Rate and Rhythm: Normal rate and regular rhythm. Heart sounds: No friction rub. Pulmonary:      Effort: Pulmonary effort is normal. No tachypnea, accessory muscle usage or respiratory distress. Breath sounds: No stridor. Wheezing present. No rhonchi or rales. Chest:      Chest wall: No tenderness. Abdominal:      General: Bowel sounds are normal. There is no distension. Palpations: Abdomen is soft. Tenderness: There is no abdominal tenderness. Musculoskeletal:         General: Normal range of motion. Cervical back: Normal range of motion and neck supple. Right lower leg: No edema. Left lower leg: No edema. Lymphadenopathy:      Cervical: No cervical adenopathy. Skin:     General: Skin is warm and dry. Findings: No erythema. Neurological:      Mental Status: She is alert and oriented to person, place, and time. Psychiatric:         Thought Content: Thought content normal.     DATA:      Radiology Review:  Pertinent images / reports were reviewed as a part of this visit.      CXR done Nov 2022 reveals the following:  Stable examination with mild left basilar atelectasis and subtle interstitial patchy opacities throughout both lungs that may represent atypical pneumonia. CT chest done Dec 2016:   No evidence of a pulmonary embolus. Diffuse bronchial wall thickening with scattered mucous plugs particularly in the left lower lobe. Bilateral patchy ground-glass opacity most pronounced in the lower lobes. Ground-glass opacity may represent atelectasis related to the mucus plugging and/or pneumonia. There is mild confluent left lower lobe airspace disease. Last PFTs done 2016:  1. Spirometry revealed evidence of severe obstructive defect. FEV1 is 1.24  L, which is 43% of predicted. There is significant response to  bronchodilators in FEV1 and FVC. FEV1/FVC ratio of 52%. 2.  Lung volume revealed normal total lung capacity 6.01 L, which is 110% of  predicted. Evidence for air-trapping with residual volume of 3.26 L, which is  181% of predicted. 3.  Diffusion capacity is mildly decreased at 19.62, which is 79% of  predicted. 4.  Flow volume loops consistent with obstructive defect. CONCLUSION:  Severe obstructive defect with bronchodilator response,  air-trapping and mildly decreased diffusion capacity. Assessment / Plan:   1. Moderate persistent asthma with acute exacerbation  - Symptoms have improved but she remains more SOB  than typical  - She does have a fine wheeze on exam  - She does not tolerate systemic or inhaled steroids which makes treatment challenging   - Continue Xopenex / Atrovent, use consistently QID while symptoms worse than normal    2.  Chronic cough  - Improved since Doxycycline but still worse than baseline  - No evidence of evolving infection  - She does have sinus drainage but given the mild weather this isn't surprising  - She cannot tolerate flonase / astelin etc   - Continue nasal saline   - This is likely multifactorial given recent epoxy exposure, vocal cord dysfunction, asthma  - Again, we are sort of on max therapy for this  - I've asked her to call the office next week with an update on her symptoms, if no improvement or worsening consider f/u imaging to r/o other etiology    Return in about 1 week (around 2/23/2023). RTC sooner if symptoms worsen.     Jaki Jane MSN APRN-ACNP CCRN

## 2023-02-23 ENCOUNTER — TELEPHONE (OUTPATIENT)
Dept: PULMONOLOGY | Age: 58
End: 2023-02-23

## 2023-02-23 NOTE — TELEPHONE ENCOUNTER
Patient left  and was wanting to talk about last apt and her upcoming apt.      [facility]: [phone]

## 2023-02-23 NOTE — TELEPHONE ENCOUNTER
Pt calling because she would like to have a zpak because she feels she is just not quite over her illness. She will be watching her granddaughter next week by herself and needs to be 100% to do this. She feels Etha Mustache would be a better antibiotic to use at this point because she has has doxycycline a lot lately.

## 2023-03-13 ENCOUNTER — HOSPITAL ENCOUNTER (OUTPATIENT)
Dept: GENERAL RADIOLOGY | Age: 58
Discharge: HOME | End: 2023-03-13
Payer: COMMERCIAL

## 2023-03-13 ENCOUNTER — HOSPITAL ENCOUNTER (OUTPATIENT)
Age: 58
Discharge: HOME | End: 2023-03-13
Payer: COMMERCIAL

## 2023-03-13 ENCOUNTER — OFFICE VISIT (OUTPATIENT)
Dept: PULMONOLOGY | Age: 58
End: 2023-03-13
Payer: COMMERCIAL

## 2023-03-13 VITALS — SYSTOLIC BLOOD PRESSURE: 120 MMHG | HEART RATE: 77 BPM | DIASTOLIC BLOOD PRESSURE: 83 MMHG | OXYGEN SATURATION: 94 %

## 2023-03-13 DIAGNOSIS — J38.3 VOCAL CORD DYSFUNCTION: ICD-10-CM

## 2023-03-13 DIAGNOSIS — J45.40 MODERATE PERSISTENT ASTHMA WITHOUT COMPLICATION: ICD-10-CM

## 2023-03-13 DIAGNOSIS — R05.3 CHRONIC COUGH: Primary | ICD-10-CM

## 2023-03-13 DIAGNOSIS — R05.3 CHRONIC COUGH: ICD-10-CM

## 2023-03-13 PROCEDURE — 99214 OFFICE O/P EST MOD 30 MIN: CPT | Performed by: INTERNAL MEDICINE

## 2023-03-13 PROCEDURE — 71046 X-RAY EXAM CHEST 2 VIEWS: CPT

## 2023-03-13 NOTE — PROGRESS NOTES
[facility]  Progress Note  MD Tony Jay   YOB: 1965    Date of Visit:  3/13/2023    Assessment/Plan:  1. Chronic cough  Has some recent exposure to bleach and epoxy that she feels has had negative impact. 2. Moderate persistent asthma without complication  Continue nebulizer treatment  This has worked the best for her. She never tried the [place] Mg 2/2 reaction she had previously to [address] has kept her stable. She takes NAC at times and feels like it benefits her    She does not tolerate steroids. Have her get CXR  She was offered bronch in the hospital but declined    3. Vocal cord dysfunction  Stable but does continue to have symptoms    FOLLOW UP: 12 months      No chief complaint on file. HPI  The patient presents with a chief complaint of moderate shortness of breath related to mild asthma of many years duration. He has mild associated cough. Exertion is a modifying factor. She has good days and bad days but overall pretty stable. She continues to take Atrovent/Xopenex in the [facility]. She had pneumonia in November that was thought possibly 2/2 aspiration. She feels that she has not completely recovered from this. She had symptoms of bronchitis last month and took Doxy and then a Z-pack. She is improved but not all the way better. Still wheezes some. Review of Systems  As documented in HPI     Physical Exam:  Well developed, well nourished  Alert and oriented  Sclera is clear  No cervical adenopathy  No JVD. Chest examination is scattered wheeze. Cardiac examination reveals regular rate and rhythm without murmur, gallop or rub. The abdomen is soft, nontender and nondistended. There is no clubbing, cyanosis or edema of the extremities. There is no obvious skin rash.   No focal neuro deficicts  Normal mood and affect    Allergies   Allergen Reactions    Breo Ellipta [Fluticasone Furoate-Vilanterol] Shortness Of Breath and Other (See Comments)     Difficulty breathing    Penicillins Anaphylaxis    Pulmicort [Budesonide] Shortness Of Breath     Difficulty breathing/asthma attack per patient    Avelox [Moxifloxacin]     Cephalosporins     Clindamycin/Lincomycin      Stomach pain      Codeine Itching    Levaquin [Levofloxacin In D5w]      Joint pains      Lincomycin Hcl      Stomach pain    Prednisone Other (See Comments)     Swelling head hurts neause  Skin hurts    Sulfa Antibiotics Hives    Sulfacetamide Sodium Hives     Breathing problems     Prior to Visit Medications    Medication Sig Taking? Authorizing Provider   levalbuterol (XOPENEX) 1.25 MG/3ML nebulizer solution Take 3 mLs by nebulization every 6 hours as needed for Wheezing DX: Moderate Persistent Asthma J45.40  MERLENE Oconnor CNP   ipratropium (ATROVENT) 0.02 % nebulizer solution Take 2.5 mLs by nebulization every 6 hours as needed for Wheezing DX: Moderate Persistent Asthma J45.40  MERLENE Oconnor CNP   levalbuterol (XOPENEX HFA) 45 MCG/ACT inhaler INSTILL 2 PUFFS EVERY 4 HOURS AS NEEDED FOR WHEEZING  Mary Trevino MD   [facility]) 45 MCG/ACT inhaler Inhale 2 puffs into the lungs every 6 hours as needed for Wheezing  Mary Trevino MD   ipratropium (ATROVENT) 0.02 % nebulizer solution Take 2.5 mLs by nebulization 4 times daily DX:Asthma J45.40  Mary Trevino MD   albuterol (PROVENTIL) (5 MG/ML) 0.5% nebulizer solution Take 1 mL by nebulization 4 times daily as needed for Wheezing  Primitivo Prince MD   levalbuterol (XOPENEX HFA) 45 MCG/ACT inhaler TAKE 2 PUFFS BY MOUTH EVERY 6 HOURS AS NEEDED FOR WHEEZE  MD abdoul [facility]) 45 MCG/ACT inhaler Inhale 2 puffs into the lungs every 4 hours as needed for Wheezing  Mary Trevino MD   ipratropium (ATROVENT) 0.02 % nebulizer solution Take 2.5 mLs by nebulization 4 times daily DX:J45.40 Asthma  Patient not taking: Reported on 2022  Bryce Lott MD   [facility]erol [facility]) 45 MCG/ACT inhaler INHALE 1 PUFF INTO THE LUNGS EVERY 4 HOURS AS NEEDED FOR WHEEZING  Bryce Lott MD   fexofenadine (ALLEGRA) 180 MG tablet Take 180 mg by mouth daily  Historical Provider, MD   ZOLMitriptan (ZOMIG) 5 MG tablet Take 1 tablet by mouth as needed for Migraine  Sri Alexander MD   acetaminophen (TYLENOL) 325 MG tablet Take 650 mg by mouth as needed  Historical Provider, MD       Vitals:    23 1612   BP: 120/83   Pulse: 77   SpO2: 94%     There is no height or weight on file to calculate BMI.      Wt Readings from Last 3 Encounters:   22 164 lb (74.4 kg)   19 155 lb (70.3 kg)   19 158 lb (71.7 kg)     BP Readings from Last 3 Encounters:   23 120/83   11/15/22 109/62   20 119/67        Social History     Tobacco Use   Smoking Status Former    Packs/day: 0.50    Years: 11.00    Pack years: 5.50    Types: Cigarettes    Quit date: 2000    Years since quittin.2   Smokeless Tobacco Never

## 2023-08-21 ENCOUNTER — TELEPHONE (OUTPATIENT)
Dept: PULMONOLOGY | Age: 58
End: 2023-08-21

## 2023-08-21 RX ORDER — LEVALBUTEROL TARTRATE 45 UG/1
2 AEROSOL, METERED ORAL EVERY 6 HOURS PRN
Qty: 15 G | Refills: 1 | Status: SHIPPED | OUTPATIENT
Start: 2023-08-21 | End: 2024-08-20

## 2023-08-21 NOTE — TELEPHONE ENCOUNTER
Spoke with patient. She states she was able to get the Levalbuterol HFA at [facility].

## 2023-08-21 NOTE — TELEPHONE ENCOUNTER
Received fax from [facility] stating Levalbuterol Inhaler is on backorder. They are requesting a substitution.

## 2024-01-23 RX ORDER — LEVALBUTEROL TARTRATE 45 UG/1
AEROSOL, METERED ORAL
Qty: 45 G | Refills: 5 | Status: SHIPPED | OUTPATIENT
Start: 2024-01-23

## 2024-04-01 ENCOUNTER — OFFICE VISIT (OUTPATIENT)
Dept: PULMONOLOGY | Age: 59
End: 2024-04-01
Payer: COMMERCIAL

## 2024-04-01 VITALS
HEART RATE: 67 BPM | HEIGHT: 65 IN | WEIGHT: 168 LBS | OXYGEN SATURATION: 93 % | BODY MASS INDEX: 27.99 KG/M2 | SYSTOLIC BLOOD PRESSURE: 126 MMHG | DIASTOLIC BLOOD PRESSURE: 80 MMHG

## 2024-04-01 DIAGNOSIS — J45.40 MODERATE PERSISTENT ASTHMA WITHOUT COMPLICATION: Primary | ICD-10-CM

## 2024-04-01 DIAGNOSIS — J38.3 VOCAL CORD DYSFUNCTION: ICD-10-CM

## 2024-04-01 PROCEDURE — 99213 OFFICE O/P EST LOW 20 MIN: CPT | Performed by: INTERNAL MEDICINE

## 2024-04-01 NOTE — PROGRESS NOTES
nebulization 4 times daily DX:Asthma J45.40  Patient not taking: Reported on 2024  Humble Hendrix MD   levalbuterol (XOPENEX HFA) 45 MCG/ACT inhaler TAKE 2 PUFFS BY MOUTH EVERY 6 HOURS AS NEEDED FOR WHEEZE  Patient not taking: Reported on 2024  Humble Hendrix MD   levalbuterol (XOPENEX HFA) 45 MCG/ACT inhaler Inhale 2 puffs into the lungs every 4 hours as needed for Wheezing  Patient not taking: Reported on 2024  Humble Hendrix MD   ipratropium (ATROVENT) 0.02 % nebulizer solution Take 2.5 mLs by nebulization 4 times daily DX:J45.40 Asthma  Patient not taking: Reported on 2022  Humble Hendrix MD       Vitals:    24 1354   BP: 126/80   Site: Left Upper Arm   Position: Sitting   Cuff Size: Medium Adult   Pulse: 67   SpO2: 93%   Weight: 76.2 kg (168 lb)   Height: 1.651 m (5' 5\")     Body mass index is 27.96 kg/m².     Wt Readings from Last 3 Encounters:   24 76.2 kg (168 lb)   22 74.4 kg (164 lb)   19 70.3 kg (155 lb)     BP Readings from Last 3 Encounters:   24 126/80   23 120/83   11/15/22 109/62        Social History     Tobacco Use   Smoking Status Former    Current packs/day: 0.00    Average packs/day: 0.5 packs/day for 11.0 years (5.5 ttl pk-yrs)    Types: Cigarettes    Start date: 1989    Quit date: 2000    Years since quittin.2   Smokeless Tobacco Never                      
room air

## 2024-06-23 DIAGNOSIS — J45.40 MODERATE PERSISTENT ASTHMA WITHOUT COMPLICATION: ICD-10-CM

## 2024-06-24 RX ORDER — LEVALBUTEROL TARTRATE 45 UG/1
AEROSOL, METERED ORAL
Qty: 45 G | Refills: 0 | Status: SHIPPED | OUTPATIENT
Start: 2024-06-24

## 2024-06-24 NOTE — TELEPHONE ENCOUNTER
Last appointment:  4/1/2024    Next appointment:  Visit date not found    Last refill: [unfilled]

## 2024-08-29 ENCOUNTER — TELEPHONE (OUTPATIENT)
Dept: PULMONOLOGY | Age: 59
End: 2024-08-29

## 2024-08-29 DIAGNOSIS — J45.40 MODERATE PERSISTENT ASTHMA WITHOUT COMPLICATION: ICD-10-CM

## 2024-08-29 RX ORDER — LEVALBUTEROL TARTRATE 45 UG/1
2 AEROSOL, METERED ORAL EVERY 6 HOURS PRN
Qty: 45 G | Refills: 5 | Status: SHIPPED | OUTPATIENT
Start: 2024-08-29

## 2024-08-29 RX ORDER — LEVALBUTEROL TARTRATE 45 UG/1
2 AEROSOL, METERED ORAL EVERY 6 HOURS PRN
Qty: 45 G | Refills: 5 | Status: CANCELLED | OUTPATIENT
Start: 2024-08-29

## 2024-08-29 RX ORDER — LEVALBUTEROL TARTRATE 45 UG/1
2 AEROSOL, METERED ORAL EVERY 6 HOURS PRN
Qty: 45 G | Refills: 5 | Status: SHIPPED | OUTPATIENT
Start: 2024-08-29 | End: 2024-08-29 | Stop reason: SDUPTHER

## 2024-08-29 NOTE — TELEPHONE ENCOUNTER
Pt called and said that the [facility] we sent Levalbuterol to's electric is out and can't fill order or send it to another [facility].  Can we send script to [facility] in [place]

## 2024-08-29 NOTE — TELEPHONE ENCOUNTER
Patient left VM and needs a refill on medication    levalbuterol (XOPENEX HFA) 45 MCG/ACT inhaler [[id number]]    [place] [address] - [address] - P [phone] - F [phone]     PH: [phone]

## 2024-11-05 ENCOUNTER — APPOINTMENT (OUTPATIENT)
Dept: GENERAL RADIOLOGY | Age: 59
End: 2024-11-05
Payer: COMMERCIAL

## 2024-11-05 ENCOUNTER — TELEPHONE (OUTPATIENT)
Dept: PULMONOLOGY | Age: 59
End: 2024-11-05

## 2024-11-05 ENCOUNTER — HOSPITAL ENCOUNTER (EMERGENCY)
Age: 59
Discharge: ANOTHER HOSPITAL | End: 2024-11-06
Attending: STUDENT IN AN ORGANIZED HEALTH CARE EDUCATION/TRAINING PROGRAM
Payer: COMMERCIAL

## 2024-11-05 ENCOUNTER — APPOINTMENT (OUTPATIENT)
Dept: CT IMAGING | Age: 59
End: 2024-11-05
Payer: COMMERCIAL

## 2024-11-05 DIAGNOSIS — J93.9 PNEUMOTHORAX, UNSPECIFIED TYPE: ICD-10-CM

## 2024-11-05 DIAGNOSIS — J98.2 PNEUMOMEDIASTINUM (HCC): ICD-10-CM

## 2024-11-05 DIAGNOSIS — R06.00 DYSPNEA, UNSPECIFIED TYPE: Primary | ICD-10-CM

## 2024-11-05 DIAGNOSIS — J18.9 PNEUMONIA DUE TO INFECTIOUS ORGANISM, UNSPECIFIED LATERALITY, UNSPECIFIED PART OF LUNG: ICD-10-CM

## 2024-11-05 DIAGNOSIS — J96.01 ACUTE RESPIRATORY FAILURE WITH HYPOXIA: ICD-10-CM

## 2024-11-05 LAB
ALBUMIN SERPL-MCNC: 4.4 G/DL (ref 3.4–5)
ALBUMIN/GLOB SERPL: 1.5 {RATIO} (ref 1.1–2.2)
ALP SERPL-CCNC: 70 U/L (ref 40–129)
ALT SERPL-CCNC: 43 U/L (ref 10–40)
ANION GAP SERPL CALCULATED.3IONS-SCNC: 15 MMOL/L (ref 3–16)
AST SERPL-CCNC: 66 U/L (ref 15–37)
BASE EXCESS BLDV CALC-SCNC: -3.2 MMOL/L (ref -3–3)
BASOPHILS # BLD: 0.1 K/UL (ref 0–0.2)
BASOPHILS NFR BLD: 0.5 %
BILIRUB SERPL-MCNC: 0.4 MG/DL (ref 0–1)
BUN SERPL-MCNC: 5 MG/DL (ref 7–20)
CALCIUM SERPL-MCNC: 9.2 MG/DL (ref 8.3–10.6)
CHLORIDE SERPL-SCNC: 105 MMOL/L (ref 99–110)
CO2 BLDV-SCNC: 24 MMOL/L
CO2 SERPL-SCNC: 22 MMOL/L (ref 21–32)
COHGB MFR BLDV: 2.1 % (ref 0–1.5)
CREAT SERPL-MCNC: 0.7 MG/DL (ref 0.6–1.2)
D-DIMER QUANTITATIVE: 0.71 UG/ML FEU (ref 0–0.6)
DEPRECATED RDW RBC AUTO: 13.6 % (ref 12.4–15.4)
EKG ATRIAL RATE: 127 BPM
EKG DIAGNOSIS: NORMAL
EKG P AXIS: 46 DEGREES
EKG P-R INTERVAL: 88 MS
EKG Q-T INTERVAL: 310 MS
EKG QRS DURATION: 84 MS
EKG QTC CALCULATION (BAZETT): 450 MS
EKG R AXIS: 18 DEGREES
EKG T AXIS: -14 DEGREES
EKG VENTRICULAR RATE: 127 BPM
EOSINOPHIL # BLD: 0.5 K/UL (ref 0–0.6)
EOSINOPHIL NFR BLD: 3.6 %
FLUAV RNA RESP QL NAA+PROBE: NOT DETECTED
FLUBV RNA RESP QL NAA+PROBE: NOT DETECTED
GFR SERPLBLD CREATININE-BSD FMLA CKD-EPI: >90 ML/MIN/{1.73_M2}
GLUCOSE SERPL-MCNC: 118 MG/DL (ref 70–99)
HCO3 BLDV-SCNC: 22.6 MMOL/L (ref 23–29)
HCT VFR BLD AUTO: 45.2 % (ref 36–48)
HGB BLD-MCNC: 15.4 G/DL (ref 12–16)
LACTATE BLDV-SCNC: 1.5 MMOL/L (ref 0.4–2)
LYMPHOCYTES # BLD: 1.1 K/UL (ref 1–5.1)
LYMPHOCYTES NFR BLD: 7.5 %
MCH RBC QN AUTO: 30.5 PG (ref 26–34)
MCHC RBC AUTO-ENTMCNC: 33.9 G/DL (ref 31–36)
MCV RBC AUTO: 89.9 FL (ref 80–100)
METHGB MFR BLDV: 0.2 %
MONOCYTES # BLD: 0.8 K/UL (ref 0–1.3)
MONOCYTES NFR BLD: 5.6 %
NEUTROPHILS # BLD: 12.4 K/UL (ref 1.7–7.7)
NEUTROPHILS NFR BLD: 82.8 %
NT-PROBNP SERPL-MCNC: 108 PG/ML (ref 0–124)
O2 THERAPY: ABNORMAL
PCO2 BLDV: 42.9 MMHG (ref 40–50)
PH BLDV: 7.34 [PH] (ref 7.35–7.45)
PLATELET # BLD AUTO: 284 K/UL (ref 135–450)
PMV BLD AUTO: 7.3 FL (ref 5–10.5)
PO2 BLDV: 39.2 MMHG (ref 25–40)
POTASSIUM SERPL-SCNC: 4.4 MMOL/L (ref 3.5–5.1)
PROT SERPL-MCNC: 7.3 G/DL (ref 6.4–8.2)
RBC # BLD AUTO: 5.03 M/UL (ref 4–5.2)
SAO2 % BLDV: 73 %
SARS-COV-2 RNA RESP QL NAA+PROBE: NOT DETECTED
SODIUM SERPL-SCNC: 142 MMOL/L (ref 136–145)
TROPONIN, HIGH SENSITIVITY: 12 NG/L (ref 0–14)
WBC # BLD AUTO: 15 K/UL (ref 4–11)

## 2024-11-05 PROCEDURE — 84484 ASSAY OF TROPONIN QUANT: CPT

## 2024-11-05 PROCEDURE — 71045 X-RAY EXAM CHEST 1 VIEW: CPT

## 2024-11-05 PROCEDURE — 96367 TX/PROPH/DG ADDL SEQ IV INF: CPT

## 2024-11-05 PROCEDURE — 6370000000 HC RX 637 (ALT 250 FOR IP): Performed by: STUDENT IN AN ORGANIZED HEALTH CARE EDUCATION/TRAINING PROGRAM

## 2024-11-05 PROCEDURE — 94761 N-INVAS EAR/PLS OXIMETRY MLT: CPT

## 2024-11-05 PROCEDURE — 2700000000 HC OXYGEN THERAPY PER DAY

## 2024-11-05 PROCEDURE — 83880 ASSAY OF NATRIURETIC PEPTIDE: CPT

## 2024-11-05 PROCEDURE — 6360000002 HC RX W HCPCS: Performed by: STUDENT IN AN ORGANIZED HEALTH CARE EDUCATION/TRAINING PROGRAM

## 2024-11-05 PROCEDURE — 85025 COMPLETE CBC W/AUTO DIFF WBC: CPT

## 2024-11-05 PROCEDURE — 94640 AIRWAY INHALATION TREATMENT: CPT

## 2024-11-05 PROCEDURE — 82803 BLOOD GASES ANY COMBINATION: CPT

## 2024-11-05 PROCEDURE — 93005 ELECTROCARDIOGRAM TRACING: CPT

## 2024-11-05 PROCEDURE — 99285 EMERGENCY DEPT VISIT HI MDM: CPT

## 2024-11-05 PROCEDURE — 85379 FIBRIN DEGRADATION QUANT: CPT

## 2024-11-05 PROCEDURE — 87636 SARSCOV2 & INF A&B AMP PRB: CPT

## 2024-11-05 PROCEDURE — 6360000004 HC RX CONTRAST MEDICATION: Performed by: STUDENT IN AN ORGANIZED HEALTH CARE EDUCATION/TRAINING PROGRAM

## 2024-11-05 PROCEDURE — 94660 CPAP INITIATION&MGMT: CPT

## 2024-11-05 PROCEDURE — 2500000003 HC RX 250 WO HCPCS: Performed by: STUDENT IN AN ORGANIZED HEALTH CARE EDUCATION/TRAINING PROGRAM

## 2024-11-05 PROCEDURE — 96376 TX/PRO/DX INJ SAME DRUG ADON: CPT

## 2024-11-05 PROCEDURE — 80053 COMPREHEN METABOLIC PANEL: CPT

## 2024-11-05 PROCEDURE — 31500 INSERT EMERGENCY AIRWAY: CPT

## 2024-11-05 PROCEDURE — 83605 ASSAY OF LACTIC ACID: CPT

## 2024-11-05 PROCEDURE — 96365 THER/PROPH/DIAG IV INF INIT: CPT

## 2024-11-05 PROCEDURE — 96366 THER/PROPH/DIAG IV INF ADDON: CPT

## 2024-11-05 PROCEDURE — 71260 CT THORAX DX C+: CPT

## 2024-11-05 PROCEDURE — 32551 INSERTION OF CHEST TUBE: CPT

## 2024-11-05 RX ORDER — IPRATROPIUM BROMIDE AND ALBUTEROL SULFATE 2.5; .5 MG/3ML; MG/3ML
1 SOLUTION RESPIRATORY (INHALATION) ONCE
Status: COMPLETED | OUTPATIENT
Start: 2024-11-05 | End: 2024-11-05

## 2024-11-05 RX ORDER — FLUCONAZOLE 2 MG/ML
200 INJECTION, SOLUTION INTRAVENOUS EVERY 24 HOURS
Status: DISCONTINUED | OUTPATIENT
Start: 2024-11-05 | End: 2024-11-06 | Stop reason: HOSPADM

## 2024-11-05 RX ORDER — PROPOFOL 10 MG/ML
5-50 INJECTION, EMULSION INTRAVENOUS ONCE
Status: COMPLETED | OUTPATIENT
Start: 2024-11-05 | End: 2024-11-06

## 2024-11-05 RX ORDER — MAGNESIUM SULFATE IN WATER 40 MG/ML
2000 INJECTION, SOLUTION INTRAVENOUS ONCE
Status: COMPLETED | OUTPATIENT
Start: 2024-11-05 | End: 2024-11-05

## 2024-11-05 RX ORDER — ETOMIDATE 2 MG/ML
0.3 INJECTION INTRAVENOUS ONCE
Status: COMPLETED | OUTPATIENT
Start: 2024-11-05 | End: 2024-11-05

## 2024-11-05 RX ORDER — FENTANYL CITRATE-0.9 % NACL/PF 10 MCG/ML
25-200 PLASTIC BAG, INJECTION (ML) INTRAVENOUS CONTINUOUS
Status: DISCONTINUED | OUTPATIENT
Start: 2024-11-05 | End: 2024-11-06 | Stop reason: HOSPADM

## 2024-11-05 RX ORDER — ROCURONIUM BROMIDE 10 MG/ML
1 INJECTION, SOLUTION INTRAVENOUS ONCE
Status: COMPLETED | OUTPATIENT
Start: 2024-11-05 | End: 2024-11-05

## 2024-11-05 RX ORDER — IOPAMIDOL 755 MG/ML
75 INJECTION, SOLUTION INTRAVASCULAR
Status: COMPLETED | OUTPATIENT
Start: 2024-11-05 | End: 2024-11-05

## 2024-11-05 RX ORDER — LEVOFLOXACIN 5 MG/ML
750 INJECTION, SOLUTION INTRAVENOUS ONCE
Status: COMPLETED | OUTPATIENT
Start: 2024-11-05 | End: 2024-11-05

## 2024-11-05 RX ADMIN — ROCURONIUM BROMIDE 76 MG: 50 INJECTION INTRAVENOUS at 22:38

## 2024-11-05 RX ADMIN — LEVOFLOXACIN 750 MG: 5 INJECTION, SOLUTION INTRAVENOUS at 21:38

## 2024-11-05 RX ADMIN — Medication 50 MCG/HR: at 23:14

## 2024-11-05 RX ADMIN — PROPOFOL 20 MCG/KG/MIN: 10 INJECTION, EMULSION INTRAVENOUS at 22:52

## 2024-11-05 RX ADMIN — IOPAMIDOL 75 ML: 755 INJECTION, SOLUTION INTRAVENOUS at 21:25

## 2024-11-05 RX ADMIN — ETOMIDATE INJECTION 22.8 MG: 2 SOLUTION INTRAVENOUS at 22:38

## 2024-11-05 RX ADMIN — MAGNESIUM SULFATE HEPTAHYDRATE 2000 MG: 40 INJECTION, SOLUTION INTRAVENOUS at 20:12

## 2024-11-05 RX ADMIN — IPRATROPIUM BROMIDE AND ALBUTEROL SULFATE 1 DOSE: 2.5; .5 SOLUTION RESPIRATORY (INHALATION) at 20:24

## 2024-11-05 ASSESSMENT — PAIN SCALES - GENERAL: PAINLEVEL_OUTOF10: 7

## 2024-11-05 ASSESSMENT — PULMONARY FUNCTION TESTS: PIF_VALUE: 42

## 2024-11-05 ASSESSMENT — LIFESTYLE VARIABLES
HOW OFTEN DO YOU HAVE A DRINK CONTAINING ALCOHOL: 2-4 TIMES A MONTH
HOW MANY STANDARD DRINKS CONTAINING ALCOHOL DO YOU HAVE ON A TYPICAL DAY: 1 OR 2

## 2024-11-05 NOTE — TELEPHONE ENCOUNTER
Patient called and said she woke up yesterday with sob and has a cough with phlegm dark yellow and clear and wants to know if doxycycline can be called in.    [facility]  PH: [phone]

## 2024-11-06 ENCOUNTER — APPOINTMENT (OUTPATIENT)
Dept: CT IMAGING | Age: 59
DRG: 871 | End: 2024-11-06
Attending: INTERNAL MEDICINE
Payer: COMMERCIAL

## 2024-11-06 ENCOUNTER — APPOINTMENT (OUTPATIENT)
Dept: GENERAL RADIOLOGY | Age: 59
DRG: 871 | End: 2024-11-06
Attending: INTERNAL MEDICINE
Payer: COMMERCIAL

## 2024-11-06 ENCOUNTER — HOSPITAL ENCOUNTER (INPATIENT)
Age: 59
LOS: 4 days | Discharge: HOME | DRG: 871 | End: 2024-11-10
Attending: INTERNAL MEDICINE | Admitting: INTERNAL MEDICINE
Payer: COMMERCIAL

## 2024-11-06 ENCOUNTER — APPOINTMENT (OUTPATIENT)
Dept: GENERAL RADIOLOGY | Age: 59
End: 2024-11-06
Payer: COMMERCIAL

## 2024-11-06 VITALS
WEIGHT: 168 LBS | RESPIRATION RATE: 17 BRPM | DIASTOLIC BLOOD PRESSURE: 54 MMHG | HEART RATE: 89 BPM | HEIGHT: 65 IN | BODY MASS INDEX: 27.99 KG/M2 | OXYGEN SATURATION: 98 % | SYSTOLIC BLOOD PRESSURE: 79 MMHG | TEMPERATURE: 98.8 F

## 2024-11-06 DIAGNOSIS — J45.51 SEVERE PERSISTENT ASTHMA WITH ACUTE EXACERBATION: ICD-10-CM

## 2024-11-06 DIAGNOSIS — J93.9 PNEUMOTHORAX ON LEFT: Primary | ICD-10-CM

## 2024-11-06 PROBLEM — J98.2 PNEUMOMEDIASTINUM (HCC): Status: ACTIVE | Noted: 2024-11-06

## 2024-11-06 PROBLEM — J96.00 ACUTE RESPIRATORY FAILURE: Status: ACTIVE | Noted: 2024-11-06

## 2024-11-06 LAB
ANION GAP SERPL CALCULATED.3IONS-SCNC: 11 MMOL/L (ref 3–16)
ANION GAP SERPL CALCULATED.3IONS-SCNC: 7 MMOL/L (ref 3–16)
BASE EXCESS BLDA CALC-SCNC: -2 MMOL/L (ref -3–3)
BASE EXCESS BLDA CALC-SCNC: -3 MMOL/L (ref -3–3)
BASOPHILS # BLD: 0 K/UL (ref 0–0.2)
BASOPHILS # BLD: 0 K/UL (ref 0–0.2)
BASOPHILS NFR BLD: 0.2 %
BASOPHILS NFR BLD: 0.2 %
BUN SERPL-MCNC: 5 MG/DL (ref 7–20)
BUN SERPL-MCNC: 8 MG/DL (ref 7–20)
CA-I BLD-SCNC: 1.1 MMOL/L (ref 1.12–1.32)
CA-I BLD-SCNC: 1.14 MMOL/L (ref 1.12–1.32)
CALCIUM SERPL-MCNC: 4.8 MG/DL (ref 8.3–10.6)
CALCIUM SERPL-MCNC: 8.1 MG/DL (ref 8.3–10.6)
CHLORIDE SERPL-SCNC: 106 MMOL/L (ref 99–110)
CHLORIDE SERPL-SCNC: 120 MMOL/L (ref 99–110)
CO2 BLDA-SCNC: 25 MMOL/L
CO2 BLDA-SCNC: 26 MMOL/L
CO2 SERPL-SCNC: 16 MMOL/L (ref 21–32)
CO2 SERPL-SCNC: 22 MMOL/L (ref 21–32)
CREAT SERPL-MCNC: 0.5 MG/DL (ref 0.6–1.2)
CREAT SERPL-MCNC: 0.7 MG/DL (ref 0.6–1.2)
DEPRECATED RDW RBC AUTO: 13.7 % (ref 12.4–15.4)
DEPRECATED RDW RBC AUTO: 13.8 % (ref 12.4–15.4)
EOSINOPHIL # BLD: 0.1 K/UL (ref 0–0.6)
EOSINOPHIL # BLD: 0.2 K/UL (ref 0–0.6)
EOSINOPHIL NFR BLD: 1.3 %
EOSINOPHIL NFR BLD: 1.3 %
GFR SERPLBLD CREATININE-BSD FMLA CKD-EPI: >90 ML/MIN/{1.73_M2}
GFR SERPLBLD CREATININE-BSD FMLA CKD-EPI: >90 ML/MIN/{1.73_M2}
GLUCOSE BLD-MCNC: 130 MG/DL (ref 70–99)
GLUCOSE BLD-MCNC: 218 MG/DL (ref 70–99)
GLUCOSE SERPL-MCNC: 119 MG/DL (ref 70–99)
GLUCOSE SERPL-MCNC: 143 MG/DL (ref 70–99)
HCO3 BLDA-SCNC: 23.5 MMOL/L (ref 21–29)
HCO3 BLDA-SCNC: 24.1 MMOL/L (ref 21–29)
HCT VFR BLD AUTO: 24.7 % (ref 36–48)
HCT VFR BLD AUTO: 41.6 % (ref 36–48)
HGB BLD-MCNC: 13.8 G/DL (ref 12–16)
HGB BLD-MCNC: 8.2 G/DL (ref 12–16)
LACTATE BLD-SCNC: 0.89 MMOL/L (ref 0.4–2)
LACTATE BLD-SCNC: 2.77 MMOL/L (ref 0.4–2)
LACTATE BLDV-SCNC: 2.6 MMOL/L (ref 0.4–2)
LACTATE BLDV-SCNC: 2.6 MMOL/L (ref 0.4–2)
LYMPHOCYTES # BLD: 0.4 K/UL (ref 1–5.1)
LYMPHOCYTES # BLD: 0.6 K/UL (ref 1–5.1)
LYMPHOCYTES NFR BLD: 4.5 %
LYMPHOCYTES NFR BLD: 4.8 %
MAGNESIUM SERPL-MCNC: 1.4 MG/DL (ref 1.8–2.4)
MCH RBC QN AUTO: 30.1 PG (ref 26–34)
MCH RBC QN AUTO: 30.9 PG (ref 26–34)
MCHC RBC AUTO-ENTMCNC: 33.2 G/DL (ref 31–36)
MCHC RBC AUTO-ENTMCNC: 33.3 G/DL (ref 31–36)
MCV RBC AUTO: 90.4 FL (ref 80–100)
MCV RBC AUTO: 93 FL (ref 80–100)
MONOCYTES # BLD: 0.5 K/UL (ref 0–1.3)
MONOCYTES # BLD: 1 K/UL (ref 0–1.3)
MONOCYTES NFR BLD: 5.9 %
MONOCYTES NFR BLD: 7.4 %
NEUTROPHILS # BLD: 12 K/UL (ref 1.7–7.7)
NEUTROPHILS # BLD: 6.7 K/UL (ref 1.7–7.7)
NEUTROPHILS NFR BLD: 86.6 %
NEUTROPHILS NFR BLD: 87.8 %
PCO2 BLDA: 44.8 MM HG (ref 35–45)
PCO2 BLDA: 45.6 MM HG (ref 35–45)
PERFORMED ON: ABNORMAL
PERFORMED ON: ABNORMAL
PH BLDA: 7.32 [PH] (ref 7.35–7.45)
PH BLDA: 7.34 [PH] (ref 7.35–7.45)
PHOSPHATE SERPL-MCNC: 2.5 MG/DL (ref 2.5–4.9)
PLATELET # BLD AUTO: 111 K/UL (ref 135–450)
PLATELET # BLD AUTO: 256 K/UL (ref 135–450)
PMV BLD AUTO: 7.7 FL (ref 5–10.5)
PMV BLD AUTO: 8.1 FL (ref 5–10.5)
PO2 BLDA: 71.3 MM HG (ref 75–108)
PO2 BLDA: 97.6 MM HG (ref 75–108)
POC SAMPLE TYPE: ABNORMAL
POC SAMPLE TYPE: ABNORMAL
POTASSIUM BLD-SCNC: 4.8 MMOL/L (ref 3.5–5.1)
POTASSIUM BLD-SCNC: 5.5 MMOL/L (ref 3.5–5.1)
POTASSIUM SERPL-SCNC: 3.2 MMOL/L (ref 3.5–5.1)
POTASSIUM SERPL-SCNC: 4.9 MMOL/L (ref 3.5–5.1)
PROCALCITONIN SERPL IA-MCNC: 0.24 NG/ML (ref 0–0.15)
PROCALCITONIN SERPL IA-MCNC: 0.5 NG/ML (ref 0–0.15)
RBC # BLD AUTO: 2.65 M/UL (ref 4–5.2)
RBC # BLD AUTO: 4.6 M/UL (ref 4–5.2)
SAO2 % BLDA: 93 % (ref 93–100)
SAO2 % BLDA: 97 % (ref 93–100)
SODIUM BLD-SCNC: 138 MMOL/L (ref 136–145)
SODIUM BLD-SCNC: 138 MMOL/L (ref 136–145)
SODIUM SERPL-SCNC: 139 MMOL/L (ref 136–145)
SODIUM SERPL-SCNC: 143 MMOL/L (ref 136–145)
WBC # BLD AUTO: 13.9 K/UL (ref 4–11)
WBC # BLD AUTO: 7.6 K/UL (ref 4–11)

## 2024-11-06 PROCEDURE — 2580000003 HC RX 258: Performed by: INTERNAL MEDICINE

## 2024-11-06 PROCEDURE — 6360000002 HC RX W HCPCS: Performed by: INTERNAL MEDICINE

## 2024-11-06 PROCEDURE — 6360000002 HC RX W HCPCS: Performed by: STUDENT IN AN ORGANIZED HEALTH CARE EDUCATION/TRAINING PROGRAM

## 2024-11-06 PROCEDURE — 94002 VENT MGMT INPAT INIT DAY: CPT

## 2024-11-06 PROCEDURE — 6360000002 HC RX W HCPCS

## 2024-11-06 PROCEDURE — 87633 RESP VIRUS 12-25 TARGETS: CPT

## 2024-11-06 PROCEDURE — 99292 CRITICAL CARE ADDL 30 MIN: CPT | Performed by: INTERNAL MEDICINE

## 2024-11-06 PROCEDURE — 96376 TX/PRO/DX INJ SAME DRUG ADON: CPT

## 2024-11-06 PROCEDURE — 82330 ASSAY OF CALCIUM: CPT

## 2024-11-06 PROCEDURE — 2580000003 HC RX 258: Performed by: EMERGENCY MEDICINE

## 2024-11-06 PROCEDURE — 87449 NOS EACH ORGANISM AG IA: CPT

## 2024-11-06 PROCEDURE — 5A1945Z RESPIRATORY VENTILATION, 24-96 CONSECUTIVE HOURS: ICD-10-PCS | Performed by: INTERNAL MEDICINE

## 2024-11-06 PROCEDURE — 6370000000 HC RX 637 (ALT 250 FOR IP): Performed by: THORACIC SURGERY (CARDIOTHORACIC VASCULAR SURGERY)

## 2024-11-06 PROCEDURE — 2580000003 HC RX 258

## 2024-11-06 PROCEDURE — 84132 ASSAY OF SERUM POTASSIUM: CPT

## 2024-11-06 PROCEDURE — 84145 PROCALCITONIN (PCT): CPT

## 2024-11-06 PROCEDURE — 71045 X-RAY EXAM CHEST 1 VIEW: CPT

## 2024-11-06 PROCEDURE — 2500000003 HC RX 250 WO HCPCS

## 2024-11-06 PROCEDURE — 84100 ASSAY OF PHOSPHORUS: CPT

## 2024-11-06 PROCEDURE — 3E033XZ INTRODUCTION OF VASOPRESSOR INTO PERIPHERAL VEIN, PERCUTANEOUS APPROACH: ICD-10-PCS

## 2024-11-06 PROCEDURE — 36415 COLL VENOUS BLD VENIPUNCTURE: CPT

## 2024-11-06 PROCEDURE — 80048 BASIC METABOLIC PNL TOTAL CA: CPT

## 2024-11-06 PROCEDURE — 83605 ASSAY OF LACTIC ACID: CPT

## 2024-11-06 PROCEDURE — 2000000000 HC ICU R&B

## 2024-11-06 PROCEDURE — 85025 COMPLETE CBC W/AUTO DIFF WBC: CPT

## 2024-11-06 PROCEDURE — 94761 N-INVAS EAR/PLS OXIMETRY MLT: CPT

## 2024-11-06 PROCEDURE — 94640 AIRWAY INHALATION TREATMENT: CPT

## 2024-11-06 PROCEDURE — 84295 ASSAY OF SERUM SODIUM: CPT

## 2024-11-06 PROCEDURE — 6370000000 HC RX 637 (ALT 250 FOR IP)

## 2024-11-06 PROCEDURE — 96366 THER/PROPH/DIAG IV INF ADDON: CPT

## 2024-11-06 PROCEDURE — 99291 CRITICAL CARE FIRST HOUR: CPT | Performed by: INTERNAL MEDICINE

## 2024-11-06 PROCEDURE — 82947 ASSAY GLUCOSE BLOOD QUANT: CPT

## 2024-11-06 PROCEDURE — 87081 CULTURE SCREEN ONLY: CPT

## 2024-11-06 PROCEDURE — 96368 THER/DIAG CONCURRENT INF: CPT

## 2024-11-06 PROCEDURE — 2700000000 HC OXYGEN THERAPY PER DAY

## 2024-11-06 PROCEDURE — 83735 ASSAY OF MAGNESIUM: CPT

## 2024-11-06 PROCEDURE — 82803 BLOOD GASES ANY COMBINATION: CPT

## 2024-11-06 PROCEDURE — 0BH17EZ INSERTION OF ENDOTRACHEAL AIRWAY INTO TRACHEA, VIA NATURAL OR ARTIFICIAL OPENING: ICD-10-PCS | Performed by: INTERNAL MEDICINE

## 2024-11-06 PROCEDURE — 2500000003 HC RX 250 WO HCPCS: Performed by: EMERGENCY MEDICINE

## 2024-11-06 RX ORDER — SODIUM CHLORIDE 0.9 % (FLUSH) 0.9 %
5-40 SYRINGE (ML) INJECTION PRN
Status: DISCONTINUED | OUTPATIENT
Start: 2024-11-06 | End: 2024-11-10 | Stop reason: HOSPADM

## 2024-11-06 RX ORDER — CALCIUM GLUCONATE 20 MG/ML
2000 INJECTION, SOLUTION INTRAVENOUS ONCE
Status: DISCONTINUED | OUTPATIENT
Start: 2024-11-06 | End: 2024-11-10

## 2024-11-06 RX ORDER — ACETAMINOPHEN 650 MG/1
650 SUPPOSITORY RECTAL EVERY 6 HOURS PRN
Status: DISCONTINUED | OUTPATIENT
Start: 2024-11-06 | End: 2024-11-08

## 2024-11-06 RX ORDER — ALBUTEROL SULFATE 90 UG/1
4 INHALANT RESPIRATORY (INHALATION) ONCE
Status: COMPLETED | OUTPATIENT
Start: 2024-11-06 | End: 2024-11-06

## 2024-11-06 RX ORDER — ALBUTEROL SULFATE 0.83 MG/ML
2.5 SOLUTION RESPIRATORY (INHALATION)
Status: DISCONTINUED | OUTPATIENT
Start: 2024-11-06 | End: 2024-11-06

## 2024-11-06 RX ORDER — IPRATROPIUM BROMIDE AND ALBUTEROL SULFATE 2.5; .5 MG/3ML; MG/3ML
1 SOLUTION RESPIRATORY (INHALATION)
Status: DISCONTINUED | OUTPATIENT
Start: 2024-11-06 | End: 2024-11-08

## 2024-11-06 RX ORDER — ACETAMINOPHEN 325 MG/1
650 TABLET ORAL EVERY 6 HOURS PRN
Status: DISCONTINUED | OUTPATIENT
Start: 2024-11-06 | End: 2024-11-08

## 2024-11-06 RX ORDER — SODIUM CHLORIDE 9 MG/ML
INJECTION, SOLUTION INTRAVENOUS CONTINUOUS
Status: ACTIVE | OUTPATIENT
Start: 2024-11-06 | End: 2024-11-06

## 2024-11-06 RX ORDER — 0.9 % SODIUM CHLORIDE 0.9 %
500 INTRAVENOUS SOLUTION INTRAVENOUS ONCE
Status: COMPLETED | OUTPATIENT
Start: 2024-11-06 | End: 2024-11-06

## 2024-11-06 RX ORDER — LEVOFLOXACIN 5 MG/ML
750 INJECTION, SOLUTION INTRAVENOUS EVERY 24 HOURS
Status: COMPLETED | OUTPATIENT
Start: 2024-11-06 | End: 2024-11-09

## 2024-11-06 RX ORDER — SODIUM CHLORIDE 9 MG/ML
INJECTION, SOLUTION INTRAVENOUS CONTINUOUS
Status: ACTIVE | OUTPATIENT
Start: 2024-11-06 | End: 2024-11-07

## 2024-11-06 RX ORDER — ONDANSETRON 2 MG/ML
4 INJECTION INTRAMUSCULAR; INTRAVENOUS EVERY 6 HOURS PRN
Status: DISCONTINUED | OUTPATIENT
Start: 2024-11-06 | End: 2024-11-10 | Stop reason: HOSPADM

## 2024-11-06 RX ORDER — VANCOMYCIN HCL IN 5 % DEXTROSE 1.25 G/25
1250 PLASTIC BAG, INJECTION (ML) INTRAVENOUS EVERY 12 HOURS
Status: DISCONTINUED | OUTPATIENT
Start: 2024-11-06 | End: 2024-11-09

## 2024-11-06 RX ORDER — SODIUM CHLORIDE 9 MG/ML
INJECTION, SOLUTION INTRAVENOUS PRN
Status: DISCONTINUED | OUTPATIENT
Start: 2024-11-06 | End: 2024-11-10 | Stop reason: HOSPADM

## 2024-11-06 RX ORDER — FENTANYL CITRATE 50 UG/ML
100 INJECTION, SOLUTION INTRAMUSCULAR; INTRAVENOUS ONCE
Status: COMPLETED | OUTPATIENT
Start: 2024-11-06 | End: 2024-11-06

## 2024-11-06 RX ORDER — ALBUTEROL SULFATE 0.83 MG/ML
2.5 SOLUTION RESPIRATORY (INHALATION) EVERY 4 HOURS PRN
Status: DISCONTINUED | OUTPATIENT
Start: 2024-11-06 | End: 2024-11-10 | Stop reason: HOSPADM

## 2024-11-06 RX ORDER — BUDESONIDE 0.5 MG/2ML
1 INHALANT ORAL
Status: DISCONTINUED | OUTPATIENT
Start: 2024-11-06 | End: 2024-11-10 | Stop reason: HOSPADM

## 2024-11-06 RX ORDER — POLYETHYLENE GLYCOL 3350 17 G/17G
17 POWDER, FOR SOLUTION ORAL DAILY PRN
Status: DISCONTINUED | OUTPATIENT
Start: 2024-11-06 | End: 2024-11-10 | Stop reason: HOSPADM

## 2024-11-06 RX ORDER — VANCOMYCIN 1.25 G/250ML
1750 INJECTION, SOLUTION INTRAVENOUS ONCE
Status: COMPLETED | OUTPATIENT
Start: 2024-11-06 | End: 2024-11-06

## 2024-11-06 RX ORDER — MAGNESIUM SULFATE IN WATER 40 MG/ML
2000 INJECTION, SOLUTION INTRAVENOUS ONCE
Status: COMPLETED | OUTPATIENT
Start: 2024-11-06 | End: 2024-11-06

## 2024-11-06 RX ORDER — FENTANYL CITRATE-0.9 % NACL/PF 10 MCG/ML
25-200 PLASTIC BAG, INJECTION (ML) INTRAVENOUS CONTINUOUS
Status: DISCONTINUED | OUTPATIENT
Start: 2024-11-06 | End: 2024-11-07 | Stop reason: ALTCHOICE

## 2024-11-06 RX ORDER — ONDANSETRON 4 MG/1
4 TABLET, ORALLY DISINTEGRATING ORAL EVERY 8 HOURS PRN
Status: DISCONTINUED | OUTPATIENT
Start: 2024-11-06 | End: 2024-11-10 | Stop reason: HOSPADM

## 2024-11-06 RX ORDER — 0.9 % SODIUM CHLORIDE 0.9 %
1000 INTRAVENOUS SOLUTION INTRAVENOUS ONCE
Status: COMPLETED | OUTPATIENT
Start: 2024-11-06 | End: 2024-11-06

## 2024-11-06 RX ORDER — FLUCONAZOLE 2 MG/ML
400 INJECTION, SOLUTION INTRAVENOUS EVERY 24 HOURS
Status: COMPLETED | OUTPATIENT
Start: 2024-11-06 | End: 2024-11-08

## 2024-11-06 RX ORDER — SODIUM CHLORIDE 0.9 % (FLUSH) 0.9 %
5-40 SYRINGE (ML) INJECTION EVERY 12 HOURS SCHEDULED
Status: DISCONTINUED | OUTPATIENT
Start: 2024-11-06 | End: 2024-11-10 | Stop reason: HOSPADM

## 2024-11-06 RX ORDER — IPRATROPIUM BROMIDE AND ALBUTEROL SULFATE 2.5; .5 MG/3ML; MG/3ML
1 SOLUTION RESPIRATORY (INHALATION)
Status: DISCONTINUED | OUTPATIENT
Start: 2024-11-06 | End: 2024-11-06

## 2024-11-06 RX ORDER — PROPOFOL 10 MG/ML
5-50 INJECTION, EMULSION INTRAVENOUS CONTINUOUS
Status: DISCONTINUED | OUTPATIENT
Start: 2024-11-06 | End: 2024-11-07 | Stop reason: ALTCHOICE

## 2024-11-06 RX ORDER — DEXMEDETOMIDINE HYDROCHLORIDE 4 UG/ML
.1-1.5 INJECTION, SOLUTION INTRAVENOUS CONTINUOUS
Status: DISCONTINUED | OUTPATIENT
Start: 2024-11-06 | End: 2024-11-10 | Stop reason: HOSPADM

## 2024-11-06 RX ORDER — NOREPINEPHRINE BITARTRATE 0.06 MG/ML
1-100 INJECTION, SOLUTION INTRAVENOUS CONTINUOUS
Status: DISCONTINUED | OUTPATIENT
Start: 2024-11-06 | End: 2024-11-10 | Stop reason: HOSPADM

## 2024-11-06 RX ORDER — PROPOFOL 10 MG/ML
5-50 INJECTION, EMULSION INTRAVENOUS CONTINUOUS
Status: DISCONTINUED | OUTPATIENT
Start: 2024-11-06 | End: 2024-11-06

## 2024-11-06 RX ADMIN — METHYLPREDNISOLONE SODIUM SUCCINATE 20 MG: 40 INJECTION INTRAMUSCULAR; INTRAVENOUS at 15:18

## 2024-11-06 RX ADMIN — SODIUM CHLORIDE: 9 INJECTION, SOLUTION INTRAVENOUS at 12:06

## 2024-11-06 RX ADMIN — NOREPINEPHRINE BITARTRATE 5 MCG/MIN: 0.06 INJECTION, SOLUTION INTRAVENOUS at 04:52

## 2024-11-06 RX ADMIN — LEVOFLOXACIN 750 MG: 750 INJECTION, SOLUTION INTRAVENOUS at 21:07

## 2024-11-06 RX ADMIN — IPRATROPIUM BROMIDE AND ALBUTEROL SULFATE 1 DOSE: 2.5; .5 SOLUTION RESPIRATORY (INHALATION) at 15:39

## 2024-11-06 RX ADMIN — SODIUM CHLORIDE: 9 INJECTION, SOLUTION INTRAVENOUS at 22:52

## 2024-11-06 RX ADMIN — POTASSIUM PHOSPHATE, MONOBASIC AND POTASSIUM PHOSPHATE, DIBASIC 30 MMOL: 224; 236 INJECTION, SOLUTION, CONCENTRATE INTRAVENOUS at 06:59

## 2024-11-06 RX ADMIN — BUDESONIDE INHALATION 1000 MCG: 0.5 SUSPENSION RESPIRATORY (INHALATION) at 20:53

## 2024-11-06 RX ADMIN — IPRATROPIUM BROMIDE AND ALBUTEROL SULFATE 1 DOSE: 2.5; .5 SOLUTION RESPIRATORY (INHALATION) at 20:53

## 2024-11-06 RX ADMIN — SODIUM CHLORIDE 500 ML: 9 INJECTION, SOLUTION INTRAVENOUS at 05:37

## 2024-11-06 RX ADMIN — SODIUM CHLORIDE: 9 INJECTION, SOLUTION INTRAVENOUS at 04:31

## 2024-11-06 RX ADMIN — Medication 50 MCG/HR: at 05:50

## 2024-11-06 RX ADMIN — Medication 4 PUFF: at 02:15

## 2024-11-06 RX ADMIN — MAGNESIUM SULFATE HEPTAHYDRATE 2000 MG: 40 INJECTION, SOLUTION INTRAVENOUS at 06:47

## 2024-11-06 RX ADMIN — FLUCONAZOLE 200 MG: 200 INJECTION, SOLUTION INTRAVENOUS at 00:04

## 2024-11-06 RX ADMIN — FLUCONAZOLE 400 MG: 2 INJECTION, SOLUTION INTRAVENOUS at 06:23

## 2024-11-06 RX ADMIN — VANCOMYCIN HYDROCHLORIDE 1250 MG: 1.25 INJECTION, SOLUTION INTRAVITREAL at 22:46

## 2024-11-06 RX ADMIN — SODIUM CHLORIDE 1000 ML: 9 INJECTION, SOLUTION INTRAVENOUS at 10:32

## 2024-11-06 RX ADMIN — BUDESONIDE INHALATION 1000 MCG: 0.5 SUSPENSION RESPIRATORY (INHALATION) at 14:18

## 2024-11-06 RX ADMIN — FENTANYL CITRATE 100 MCG: 50 INJECTION, SOLUTION INTRAMUSCULAR; INTRAVENOUS at 00:18

## 2024-11-06 RX ADMIN — PROPOFOL 10 MCG/KG/MIN: 10 INJECTION, EMULSION INTRAVENOUS at 06:03

## 2024-11-06 RX ADMIN — PROPOFOL 35 MCG/KG/MIN: 10 INJECTION, EMULSION INTRAVENOUS at 11:32

## 2024-11-06 RX ADMIN — IPRATROPIUM BROMIDE AND ALBUTEROL SULFATE 1 DOSE: 2.5; .5 SOLUTION RESPIRATORY (INHALATION) at 11:39

## 2024-11-06 RX ADMIN — ALBUTEROL SULFATE 2.5 MG: 2.5 SOLUTION RESPIRATORY (INHALATION) at 14:07

## 2024-11-06 RX ADMIN — DEXMEDETOMIDINE HYDROCHLORIDE 1 MCG/KG/HR: 400 INJECTION, SOLUTION INTRAVENOUS at 17:21

## 2024-11-06 RX ADMIN — DEXMEDETOMIDINE 1 MCG/KG/HR: 100 INJECTION, SOLUTION INTRAVENOUS at 01:20

## 2024-11-06 RX ADMIN — SODIUM CHLORIDE, PRESERVATIVE FREE 40 MG: 5 INJECTION INTRAVENOUS at 11:14

## 2024-11-06 RX ADMIN — DEXMEDETOMIDINE HYDROCHLORIDE 0.2 MCG/KG/HR: 400 INJECTION, SOLUTION INTRAVENOUS at 11:46

## 2024-11-06 RX ADMIN — ALBUTEROL SULFATE 2.5 MG: 2.5 SOLUTION RESPIRATORY (INHALATION) at 07:46

## 2024-11-06 RX ADMIN — VANCOMYCIN 1750 MG: 1.25 INJECTION, SOLUTION INTRAVENOUS at 11:19

## 2024-11-06 RX ADMIN — Medication 75 MCG/HR: at 21:04

## 2024-11-06 ASSESSMENT — PULMONARY FUNCTION TESTS
PIF_VALUE: 15
PIF_VALUE: 13
PIF_VALUE: 11
PIF_VALUE: 13
PIF_VALUE: 13
PIF_VALUE: 7
PIF_VALUE: 13
PIF_VALUE: 14
PIF_VALUE: 13
PIF_VALUE: 13
PIF_VALUE: 11
PIF_VALUE: 13
PIF_VALUE: 14
PIF_VALUE: 13
PIF_VALUE: 8
PIF_VALUE: 13
PIF_VALUE: 11
PIF_VALUE: 11
PIF_VALUE: 13
PIF_VALUE: 11
PIF_VALUE: 13
PIF_VALUE: 12
PIF_VALUE: 13
PIF_VALUE: 18
PIF_VALUE: 13
PIF_VALUE: 13
PIF_VALUE: 17
PIF_VALUE: 12
PIF_VALUE: 13
PIF_VALUE: 13
PIF_VALUE: 11
PIF_VALUE: 13
PIF_VALUE: 11
PIF_VALUE: 13
PIF_VALUE: 12
PIF_VALUE: 18
PIF_VALUE: 11
PIF_VALUE: 14
PIF_VALUE: 13
PIF_VALUE: 11
PIF_VALUE: 14
PIF_VALUE: 13
PIF_VALUE: 21
PIF_VALUE: 11
PIF_VALUE: 10
PIF_VALUE: 12
PIF_VALUE: 11
PIF_VALUE: 10
PIF_VALUE: 11
PIF_VALUE: 13
PIF_VALUE: 13
PIF_VALUE: 11
PIF_VALUE: 10
PIF_VALUE: 11
PIF_VALUE: 13
PIF_VALUE: 10
PIF_VALUE: 13
PIF_VALUE: 13
PIF_VALUE: 14
PIF_VALUE: 10
PIF_VALUE: 13
PIF_VALUE: 13
PIF_VALUE: 11
PIF_VALUE: 13
PIF_VALUE: 13
PIF_VALUE: 11
PIF_VALUE: 12
PIF_VALUE: 11
PIF_VALUE: 13
PIF_VALUE: 12
PIF_VALUE: 13
PIF_VALUE: 11
PIF_VALUE: 13
PIF_VALUE: 11
PIF_VALUE: 13
PIF_VALUE: 7
PIF_VALUE: 12
PIF_VALUE: 13
PIF_VALUE: 13

## 2024-11-06 NOTE — ED PROVIDER NOTES
Calculation (Bazett) 450 ms    P Axis 46 degrees    R Axis 18 degrees    T Axis -14 degrees    Diagnosis       Sinus tachycardia with short PRNonspecific ST and T wave abnormalityAbnormal ECGWhen compared with ECG of 11-NOV-2022 15:50,T wave inversion now evident in Inferior leads       ED BEDSIDE ULTRASOUND:  No results found.  Unable to upload from Q path and pericardial effusion vigorous function normal LVEF and right heart is perhaps slightly dilated but no other markers of right heart strain.  IVC is collapsible    RECENT VITALS:  BP: (!) 153/87, Temp: 98.8 °F (37.1 °C), Pulse: 97,Respirations: 18, SpO2: 91 %     Procedures     Intubation    Date/Time: 11/5/2024 10:46 PM    Performed by: Niels Morrow MD  Authorized by: Niels Morrow MD    Consent:     Consent obtained:  Verbal    Consent given by:  Patient    Risks, benefits, and alternatives were discussed: yes    Universal protocol:     Patient identity confirmed:  Verbally with patient  Pre-procedure details:     Indications: respiratory distress and respiratory failure      Patient status:  Awake    Look externally: no concerns      Mouth opening - incisor distance:  3 or more finger widths    Hyoid-mental distance: 3 or more finger widths      Hyoid-thyroid distance: 2 or more finger widths      Mallampati score:  I    Obstruction: none      Neck mobility: normal      Pharmacologic strategy: RSI      Induction agents:  Etomidate    Paralytics:  Rocuronium  Procedure details:     Preoxygenation:  Nonrebreather mask    CPR in progress: no      Number of attempts:  1  Successful intubation attempt details:     Intubation method:  Oral    Intubation technique: video assisted      Laryngoscope blade:  Hypercurved    Bougie used: no      Grade view: I      Tube size (mm):  8.0    Tube type:  Cuffed    Tube visualized through cords: yes    Placement assessment:     ETT at teeth/gumline (cm):  22    Tube secured with:  ETT smith    Breath sounds:  Equal     Decision-making details documented in ED Course.  Discussion of management or test interpretation with external provider(s): Thoracic surgery    Risk  Prescription drug management.  Decision regarding hospitalization.  Minor surgery with no identified risk factors.          The patient was given the followingmedications:  Orders Placed This Encounter   Medications    magnesium sulfate 2000 mg in 50 mL IVPB premix    ipratropium 0.5 mg-albuterol 2.5 mg (DUONEB) nebulizer solution 1 Dose     Order Specific Question:   Initiate RT Bronchodilator Protocol     Answer:   Yes - Inpatient Protocol    iopamidol (ISOVUE-370) 76 % injection 75 mL    levoFLOXacin (LEVAQUIN) 750 MG/150ML infusion 750 mg     Order Specific Question:   Antimicrobial Indications     Answer:   Pneumonia (CAP)    etomidate (AMIDATE) injection 22.8 mg    rocuronium (ZEMURON) injection 76 mg    fentaNYL (SUBLIMAZE) 1,000 mcg in sodium chloride 0.9% 100 mL infusion     Order Specific Question:   Titrate Infusion?     Answer:   Yes     Order Specific Question:   Initial Infusion Dose:     Answer:   50 mcg/hr     Order Specific Question:   Goal of Therapy is:     Answer:   RASS of 0 to -1     Order Specific Question:   Contact Provider if:     Answer:   Patient is receiving the maximum dose and is not achieving the goal of therapy    propofol infusion     Order Specific Question:   Titrate Infusion?     Answer:   Yes     Order Specific Question:   Initial Infusion Dose:     Answer:   20 mcg/kg/min     Order Specific Question:   Goal of Therapy:     Answer:   RASS of 0 to -1     Order Specific Question:   Contact Provider if:     Answer:   New onset HR less than 50 bpm     Order Specific Question:   Contact Provider if:     Answer:   New onset SBP less than 90 mmHg     Order Specific Question:   Contact Provider if:     Answer:   Patient is receiving maximum dose and is not achieving the goal of therapy     Order Specific Question:   Contact Provider if:

## 2024-11-06 NOTE — PLAN OF CARE
Problem: Safety - Medical Restraint  Goal: Remains free of injury from restraints (Restraint for Interference with Medical Device)  Description: INTERVENTIONS:  1. Determine that other, less restrictive measures have been tried or would not be effective before applying the restraint  2. Evaluate the patient's condition at the time of restraint application  3. Inform patient/family regarding the reason for restraint  4. Q2H: Monitor safety, psychosocial status, comfort, nutrition and hydration  11/6/2024 1223 by Joann Lau RN  Outcome: Progressing  Flowsheets  Taken 11/6/2024 1200  Remains free of injury from restraints (restraint for interference with medical device): Every 2 hours: Monitor safety, psychosocial status, comfort, nutrition and hydration  Taken 11/6/2024 1000  Remains free of injury from restraints (restraint for interference with medical device): Every 2 hours: Monitor safety, psychosocial status, comfort, nutrition and hydration  11/6/2024 0812 by Gilma Jolley RN  Outcome: Progressing  Flowsheets  Taken 11/6/2024 0800 by Joann Lau RN  Remains free of injury from restraints (restraint for interference with medical device): Every 2 hours: Monitor safety, psychosocial status, comfort, nutrition and hydration  Taken 11/6/2024 0600 by Gilma Jolley RN  Remains free of injury from restraints (restraint for interference with medical device):   Determine that other, less restrictive measures have been tried or would not be effective before applying the restraint   Inform patient/family regarding the reason for restraint   Evaluate the patient's condition at the time of restraint application   Every 2 hours: Monitor safety, psychosocial status, comfort, nutrition and hydration  Taken 11/6/2024 0425 by Gilma Jolley RN  Remains free of injury from restraints (restraint for interference with medical device):   Determine that other, less restrictive measures have been tried or would

## 2024-11-06 NOTE — PROGRESS NOTES
11/05/24 2341   Patient Observation   Pulse 99   Respirations 18   SpO2 92 %   Vent Information   Vent Mode AC/VC   Ventilator Settings   FiO2  100 %   Vt (Set, mL) (S)  400 mL   Resp Rate (Set) (S)  18 bpm   PEEP/CPAP (cmH2O) 8   Vent Patient Data (Readings)   Vt (Measured) 405 mL   Peak Inspiratory Pressure (cmH2O) 42 cmH2O   Rate Measured 18 br/min   I:E Ratio 1:2.0   Vent Alarm Settings   High Pressure (cmH2O) 40 cmH2O   Low Minute Volume (lpm) 2 L/min   High Minute Volume (lpm) 20 L/min   Low Exhaled Vt (ml) 200 mL   Additional Respiratoray Assessments   Humidification Source HME   ETT    No placement date or time found.   Placed By: In ED  Placement Verified By: Capnometry  Technique: Video laryngoscopy  Airway Type: Cuffed  Airway Tube Size: 8 mm  Laryngoscope: GlideScope  Blade Size: 3  Location: Oral  Grade View: Full view of the g...   Secured At 23 cm   Measured From Lips   ETT Placement Center   Secured By Commercial tube smith   Cuff Pressure 30 cm H2O

## 2024-11-06 NOTE — H&P
ICU HISTORY AND PHYSICAL       Admit Date:  11/6/24                            Hospital Day: 1  ICU Day: 1    CC: SOB    History obtained from:  chart review    SUBJECTIVE   HPI:    Ms. Shannon Zeng is a 59 y.o. female with a medical hx significant for moderate persistent asthma and vocal cord dysfunction, otherwise as listed in the MHx table below, who presented from home to the [facility] ED on 11/6/24 with shortness of breath.    Per chart review, patient was having severe shortness of breath over the last day. The shortness of breath persisted despite albuterol inhaler use, which prompted her to call EMS. Upon EMS arrival, patient was given additional albuterol treatments and was saturating in the 60s on room air. Of note, patient has a steroid allergy and could not receive steroid medications.     Upon arrival to the ED, patient was unable to saturate above 80%  despite high flow nasal canula . Imaging revealed a small left sided pneumothorax as well as a pneumomediastinum and bilateral lung consolidations, with the overall picture concerning of esophageal perforation, thoracic surgery was contacted who reccommended swallow study.    Patient then began to decompensate in the ED; she had difficulty lying flat and was fearful of coming off of oxygen. At this point, decision was made with patient to proceed with intubation.     Repeat imaging after intubation demonstrated progression of the pneumothorax and a surgical chest tube was placed    Patient was transferred to [facility] per the recommendations of thoracic surgery for further management.     Of note, per chart review, patient called her pulmonologist on 11/5 at 5 PM stating she had woken up the previous day with shortness of breath and cough with dark yellow sputum.       In 2022, patient presented to the ED with SOB, cough, brown sputum, and saturations of 88%. She believed she aspirated due to vocal cord dysfunction. Was treated for possible

## 2024-11-06 NOTE — PROGRESS NOTES
11/05/24 2250   Patient Observation   Pulse (!) 136   Respirations 15   SpO2 94 %   Vent Information   Vent Mode AC/VC   Ventilator Settings   FiO2  100 %   Vt (Set, mL) 450 mL   Resp Rate (Set) 16 bpm   PEEP/CPAP (cmH2O) 8   Vent Patient Data (Readings)   Vt (Measured) 451 mL   Rate Measured 16 br/min   Vent Alarm Settings   High Pressure (cmH2O) 40 cmH2O   Low Minute Volume (lpm) 2 L/min   High Minute Volume (lpm) 20 L/min   Low Exhaled Vt (ml) 200 mL   High Exhaled Vt (ml) 1000 mL   Additional Respiratoray Assessments   Humidification Source HME   ETT    No placement date or time found.   Placed By: In ED  Placement Verified By: Capnometry  Technique: Video laryngoscopy  Airway Type: Cuffed  Airway Tube Size: 8 mm  Laryngoscope: GlideScope  Blade Size: 3  Location: Oral  Grade View: Full view of the g...   $ Intubation Emergent  $ Yes   Secured At 23 cm   Secured By Commercial tube smith   Cuff Pressure 30 cm H2O

## 2024-11-06 NOTE — CONSULTS
Called cardiothoracic surgery @ 2140  PER:  Niels Morrow MD  RE: Pneumomediastinum and small pneumothorax and lung consolidations  Aye Moreira MD responded @ 2144

## 2024-11-06 NOTE — ED NOTES
2358- [facility] called to initiate transfer for CT surgery   0025- Dr. Ceron called to speak to Dr. Morrow about patient transfer  Patient going to [facility].  Bed assignment: 4506  N2N: [phone]  Awaiting transport.   
Dr. Morrow at bedside, informed that her oxygen is reaching about 68% with worsening chest tightness.  
Patient was hooked to vapotherm at 40 LPM with non rebreather 15 LPM, oxygen saturation noted 79%- 81%. Dr. Morrow on bedside doing bedside echo.  
Pushed patient to CT scan on non rebreather 15 LPM and high flow nasal cannula 15 LPM. Dr. Morrow around while on going procedure. Oxygen saturation noted highest at 90%  
Rapid bolus of Fentanyl 100 mcg IV as ordered by Dr. Morrow given.  
esophageal thickening is identified.  No mediastinal lymphadenopathy is seen.  Pneumomediastinum is seen.. Lungs/pleura: Small left-sided hydropneumothorax.  Subsegmental atelectasis is identified as well as patchy ground-glass infiltrates in the lungs bilaterally.  Lower lobar collapse bilaterally.  No spiculated appearing lung mass is identified. Upper Abdomen: No acute inflammatory process seen in the upper abdomen.  No mass is identified. Soft Tissues/Bones: No axillary or supraclavicular lymphadenopathy is identified.  No acute osseous abnormality is identified.  Respiratory motion artifact.  Minimal degenerative change in the spine.  Postoperative change seen related to prior anterior cervical fusion.     1. No pulmonary emboli. 2. Small left-sided hydropneumothorax. 3. Pneumomediastinum. 4. Patchy ground-glass infiltrates in the lungs bilaterally, with lower lobar collapse bilaterally.  Findings suspicious for multilobar pneumonia.     XR CHEST PORTABLE    Result Date: 11/5/2024  EXAMINATION: ONE XRAY VIEW OF THE CHEST 11/5/2024 8:09 pm COMPARISON: 03/13/2023 HISTORY: ORDERING SYSTEM PROVIDED HISTORY: Shortness of breath TECHNOLOGIST PROVIDED HISTORY: Reason for exam:->Shortness of breath Reason for Exam: Shortness of Breat FINDINGS: The cardiac silhouette is normal.  There is pneumomediastinum.  Consolidative opacities are seen in the lower lungs.  Consolidative opacity in the right upper lung.  Small left pleural effusion.  Small left pneumothorax is better evaluated in the subsequently obtained CT chest.  No acute bony abnormalities.  ACDF is noted.     1. Pneumomediastinum. 2. Consolidative opacities in the lower lungs and right upper lung. 3. Small left pleural effusion. 4. I discussed the findings with Dr. Morrow in ED at 9:35 p.m., 11/05/2020

## 2024-11-06 NOTE — TRANSFER CENTER NOTE
[facility] Hospitalist Transfer accept note  Transfer center PS received  Case reviewed with ER physician  Reason for Transfer:    Shannon Zeng 59 y.o. female presents with acute respiratory failure - BL consolidation, PTX of L side and pneumomediastinum. Patient intubated in ER and is being transferred to [facility] - case discussed with thoracic surgery at [facility]. S/p chest tube placed on the left side in the ER. Given levaquin and diflucan in ER- long list of allergies. ? Esophageal perforation as the cause of PTX. May need repeat CT      Patient has been accepted for transfer to [facility] ICU    Once patient arrive please page ON CALL HOSPITALIST so patient can be seen.   If unable to reach physician on [employer] please call hospitalist phone (#[phone])     PCP: No primary care provider on file.     Thanks  MARK ZIEGLER MD  Hospitalist

## 2024-11-06 NOTE — PLAN OF CARE
Problem: Safety - Medical Restraint  Goal: Remains free of injury from restraints (Restraint for Interference with Medical Device)  Description: INTERVENTIONS:  1. Determine that other, less restrictive measures have been tried or would not be effective before applying the restraint  2. Evaluate the patient's condition at the time of restraint application  3. Inform patient/family regarding the reason for restraint  4. Q2H: Monitor safety, psychosocial status, comfort, nutrition and hydration  Outcome: Progressing  Flowsheets  Taken 11/6/2024 0600  Remains free of injury from restraints (restraint for interference with medical device):   Determine that other, less restrictive measures have been tried or would not be effective before applying the restraint   Inform patient/family regarding the reason for restraint   Evaluate the patient's condition at the time of restraint application   Every 2 hours: Monitor safety, psychosocial status, comfort, nutrition and hydration  Taken 11/6/2024 0425  Remains free of injury from restraints (restraint for interference with medical device):   Determine that other, less restrictive measures have been tried or would not be effective before applying the restraint   Evaluate the patient's condition at the time of restraint application   Inform patient/family regarding the reason for restraint   Every 2 hours: Monitor safety, psychosocial status, comfort, nutrition and hydration     Problem: Discharge Planning  Goal: Discharge to home or other facility with appropriate resources  Outcome: Progressing     Problem: Pain  Goal: Verbalizes/displays adequate comfort level or baseline comfort level  Outcome: Progressing

## 2024-11-06 NOTE — SIGNIFICANT EVENT
Patient weaned of propofol and was resting comfortably on precedex 0.4 and fentanyl 75.  She then awoke and was distressed about her breathing and \"gurgling\" sounds in her lungs.  I was able to communicate with her about her steroid issues.  She has had less than Raji responses to certain treatments in the past, specifically steroids.  She had a one time dose of Breo in a pulmonologists office and said she had to go to the emergency room immediately after.  She said they did a CT at that time as well.    I explained that the reactions she was describing aren't consistent with typical, or any reaction that I've heard of related to steroids.  I believe the reactions are real, but while they may correlate with the steroids, they likely aren't caused by the steroids.  Since she has life threatening respiratory failure from uncontrolled asthma, she was agreeable to trying some low dose steroids.  I wrote for inhaled pulmicort neb and divided doses of solumedrol 20mg IV to be given BID.

## 2024-11-07 ENCOUNTER — APPOINTMENT (OUTPATIENT)
Dept: GENERAL RADIOLOGY | Age: 59
DRG: 871 | End: 2024-11-07
Attending: INTERNAL MEDICINE
Payer: COMMERCIAL

## 2024-11-07 ENCOUNTER — APPOINTMENT (OUTPATIENT)
Dept: CT IMAGING | Age: 59
DRG: 871 | End: 2024-11-07
Attending: INTERNAL MEDICINE
Payer: COMMERCIAL

## 2024-11-07 LAB
ANION GAP SERPL CALCULATED.3IONS-SCNC: 7 MMOL/L (ref 3–16)
BASE EXCESS BLDA CALC-SCNC: -6 MMOL/L (ref -3–3)
BASOPHILS # BLD: 0 K/UL (ref 0–0.2)
BASOPHILS NFR BLD: 0.2 %
BUN SERPL-MCNC: 9 MG/DL (ref 7–20)
CALCIUM SERPL-MCNC: 8.4 MG/DL (ref 8.3–10.6)
CHLORIDE SERPL-SCNC: 108 MMOL/L (ref 99–110)
CO2 BLDA-SCNC: 18 MMOL/L
CO2 SERPL-SCNC: 23 MMOL/L (ref 21–32)
CREAT SERPL-MCNC: 0.6 MG/DL (ref 0.6–1.2)
DEPRECATED RDW RBC AUTO: 13.9 % (ref 12.4–15.4)
EOSINOPHIL # BLD: 0 K/UL (ref 0–0.6)
EOSINOPHIL NFR BLD: 0 %
GFR SERPLBLD CREATININE-BSD FMLA CKD-EPI: >90 ML/MIN/{1.73_M2}
GLUCOSE SERPL-MCNC: 133 MG/DL (ref 70–99)
HCO3 BLDA-SCNC: 17 MMOL/L (ref 21–29)
HCT VFR BLD AUTO: 37.4 % (ref 36–48)
HGB BLD-MCNC: 12.3 G/DL (ref 12–16)
LEGIONELLA AG UR QL: NORMAL
LYMPHOCYTES # BLD: 0.4 K/UL (ref 1–5.1)
LYMPHOCYTES NFR BLD: 2.7 %
MAGNESIUM SERPL-MCNC: 2.04 MG/DL (ref 1.8–2.4)
MCH RBC QN AUTO: 29.8 PG (ref 26–34)
MCHC RBC AUTO-ENTMCNC: 32.9 G/DL (ref 31–36)
MCV RBC AUTO: 90.8 FL (ref 80–100)
MONOCYTES # BLD: 0.6 K/UL (ref 0–1.3)
MONOCYTES NFR BLD: 4.1 %
NEUTROPHILS # BLD: 13.1 K/UL (ref 1.7–7.7)
NEUTROPHILS NFR BLD: 93 %
ORGANISM: ABNORMAL
PCO2 BLDA: 20.1 MM HG (ref 35–45)
PERFORMED ON: ABNORMAL
PH BLDA: 7.54 [PH] (ref 7.35–7.45)
PHOSPHATE SERPL-MCNC: 1.7 MG/DL (ref 2.5–4.9)
PLATELET # BLD AUTO: 225 K/UL (ref 135–450)
PMV BLD AUTO: 7.7 FL (ref 5–10.5)
PO2 BLDA: 66.4 MM HG (ref 75–108)
POC SAMPLE TYPE: ABNORMAL
POTASSIUM SERPL-SCNC: 4.2 MMOL/L (ref 3.5–5.1)
RBC # BLD AUTO: 4.12 M/UL (ref 4–5.2)
REPORT: NORMAL
RESP PATH DNA+RNA PNL L RESP NAA+NON-PRB: ABNORMAL
S PNEUM AG UR QL: NORMAL
SAO2 % BLDA: 96 % (ref 93–100)
SODIUM SERPL-SCNC: 138 MMOL/L (ref 136–145)
WBC # BLD AUTO: 14.1 K/UL (ref 4–11)

## 2024-11-07 PROCEDURE — 94761 N-INVAS EAR/PLS OXIMETRY MLT: CPT

## 2024-11-07 PROCEDURE — 2580000003 HC RX 258: Performed by: INTERNAL MEDICINE

## 2024-11-07 PROCEDURE — 83735 ASSAY OF MAGNESIUM: CPT

## 2024-11-07 PROCEDURE — 94640 AIRWAY INHALATION TREATMENT: CPT

## 2024-11-07 PROCEDURE — 6370000000 HC RX 637 (ALT 250 FOR IP)

## 2024-11-07 PROCEDURE — 2000000000 HC ICU R&B

## 2024-11-07 PROCEDURE — 2580000003 HC RX 258

## 2024-11-07 PROCEDURE — C1751 CATH, INF, PER/CENT/MIDLINE: HCPCS

## 2024-11-07 PROCEDURE — 80048 BASIC METABOLIC PNL TOTAL CA: CPT

## 2024-11-07 PROCEDURE — 2500000003 HC RX 250 WO HCPCS

## 2024-11-07 PROCEDURE — 6360000004 HC RX CONTRAST MEDICATION

## 2024-11-07 PROCEDURE — 85025 COMPLETE CBC W/AUTO DIFF WBC: CPT

## 2024-11-07 PROCEDURE — 05HD33Z INSERTION OF INFUSION DEVICE INTO RIGHT CEPHALIC VEIN, PERCUTANEOUS APPROACH: ICD-10-PCS | Performed by: INTERNAL MEDICINE

## 2024-11-07 PROCEDURE — 94003 VENT MGMT INPAT SUBQ DAY: CPT

## 2024-11-07 PROCEDURE — 36410 VNPNXR 3YR/> PHY/QHP DX/THER: CPT

## 2024-11-07 PROCEDURE — 6360000002 HC RX W HCPCS: Performed by: INTERNAL MEDICINE

## 2024-11-07 PROCEDURE — 2700000000 HC OXYGEN THERAPY PER DAY

## 2024-11-07 PROCEDURE — 6360000002 HC RX W HCPCS

## 2024-11-07 PROCEDURE — 84100 ASSAY OF PHOSPHORUS: CPT

## 2024-11-07 PROCEDURE — 99291 CRITICAL CARE FIRST HOUR: CPT | Performed by: INTERNAL MEDICINE

## 2024-11-07 PROCEDURE — 71045 X-RAY EXAM CHEST 1 VIEW: CPT

## 2024-11-07 PROCEDURE — 71250 CT THORAX DX C-: CPT

## 2024-11-07 PROCEDURE — 82803 BLOOD GASES ANY COMBINATION: CPT

## 2024-11-07 RX ORDER — HYDROXYZINE HYDROCHLORIDE 25 MG/1
25 TABLET, FILM COATED ORAL 3 TIMES DAILY PRN
Status: DISCONTINUED | OUTPATIENT
Start: 2024-11-07 | End: 2024-11-10 | Stop reason: HOSPADM

## 2024-11-07 RX ORDER — LIDOCAINE HYDROCHLORIDE 10 MG/ML
50 INJECTION, SOLUTION EPIDURAL; INFILTRATION; INTRACAUDAL; PERINEURAL ONCE
Status: COMPLETED | OUTPATIENT
Start: 2024-11-07 | End: 2024-11-07

## 2024-11-07 RX ORDER — LIDOCAINE 4 G/G
1 PATCH TOPICAL DAILY
Status: DISCONTINUED | OUTPATIENT
Start: 2024-11-08 | End: 2024-11-10 | Stop reason: HOSPADM

## 2024-11-07 RX ORDER — ENOXAPARIN SODIUM 100 MG/ML
40 INJECTION SUBCUTANEOUS EVERY EVENING
Status: DISCONTINUED | OUTPATIENT
Start: 2024-11-07 | End: 2024-11-10 | Stop reason: HOSPADM

## 2024-11-07 RX ORDER — DIATRIZOATE MEGLUMINE AND DIATRIZOATE SODIUM 660; 100 MG/ML; MG/ML
30 SOLUTION ORAL; RECTAL
Status: DISCONTINUED | OUTPATIENT
Start: 2024-11-07 | End: 2024-11-10 | Stop reason: HOSPADM

## 2024-11-07 RX ORDER — SODIUM CHLORIDE 0.9 % (FLUSH) 0.9 %
5-40 SYRINGE (ML) INJECTION PRN
Status: DISCONTINUED | OUTPATIENT
Start: 2024-11-07 | End: 2024-11-10 | Stop reason: HOSPADM

## 2024-11-07 RX ORDER — SODIUM CHLORIDE 9 MG/ML
INJECTION, SOLUTION INTRAVENOUS PRN
Status: DISCONTINUED | OUTPATIENT
Start: 2024-11-07 | End: 2024-11-10 | Stop reason: HOSPADM

## 2024-11-07 RX ORDER — SODIUM CHLORIDE 0.9 % (FLUSH) 0.9 %
5-40 SYRINGE (ML) INJECTION EVERY 12 HOURS SCHEDULED
Status: DISCONTINUED | OUTPATIENT
Start: 2024-11-07 | End: 2024-11-10 | Stop reason: HOSPADM

## 2024-11-07 RX ADMIN — DEXMEDETOMIDINE HYDROCHLORIDE 0.6 MCG/KG/HR: 400 INJECTION, SOLUTION INTRAVENOUS at 08:47

## 2024-11-07 RX ADMIN — METHYLPREDNISOLONE SODIUM SUCCINATE 20 MG: 40 INJECTION INTRAMUSCULAR; INTRAVENOUS at 14:38

## 2024-11-07 RX ADMIN — IPRATROPIUM BROMIDE AND ALBUTEROL SULFATE 1 DOSE: 2.5; .5 SOLUTION RESPIRATORY (INHALATION) at 12:02

## 2024-11-07 RX ADMIN — DEXMEDETOMIDINE HYDROCHLORIDE 0.2 MCG/KG/HR: 400 INJECTION, SOLUTION INTRAVENOUS at 15:54

## 2024-11-07 RX ADMIN — ALBUTEROL SULFATE 2.5 MG: 2.5 SOLUTION RESPIRATORY (INHALATION) at 06:39

## 2024-11-07 RX ADMIN — SODIUM CHLORIDE, PRESERVATIVE FREE 10 ML: 5 INJECTION INTRAVENOUS at 20:22

## 2024-11-07 RX ADMIN — LIDOCAINE HYDROCHLORIDE ANHYDROUS 50 MG: 10 INJECTION, SOLUTION INFILTRATION at 09:32

## 2024-11-07 RX ADMIN — HYDROXYZINE HYDROCHLORIDE 25 MG: 25 TABLET ORAL at 20:22

## 2024-11-07 RX ADMIN — SODIUM CHLORIDE, PRESERVATIVE FREE 10 ML: 5 INJECTION INTRAVENOUS at 20:23

## 2024-11-07 RX ADMIN — VANCOMYCIN HYDROCHLORIDE 1250 MG: 1.25 INJECTION, SOLUTION INTRAVITREAL at 10:54

## 2024-11-07 RX ADMIN — FLUCONAZOLE 400 MG: 2 INJECTION, SOLUTION INTRAVENOUS at 06:12

## 2024-11-07 RX ADMIN — SODIUM CHLORIDE, PRESERVATIVE FREE 10 ML: 5 INJECTION INTRAVENOUS at 09:31

## 2024-11-07 RX ADMIN — BUDESONIDE INHALATION 1000 MCG: 0.5 SUSPENSION RESPIRATORY (INHALATION) at 07:43

## 2024-11-07 RX ADMIN — IPRATROPIUM BROMIDE AND ALBUTEROL SULFATE 1 DOSE: 2.5; .5 SOLUTION RESPIRATORY (INHALATION) at 16:35

## 2024-11-07 RX ADMIN — SODIUM CHLORIDE, PRESERVATIVE FREE 40 MG: 5 INJECTION INTRAVENOUS at 10:50

## 2024-11-07 RX ADMIN — LEVOFLOXACIN 750 MG: 750 INJECTION, SOLUTION INTRAVENOUS at 21:55

## 2024-11-07 RX ADMIN — DIATRIZOATE MEGLUMINE AND DIATRIZOATE SODIUM 30 ML: 660; 100 LIQUID ORAL; RECTAL at 13:04

## 2024-11-07 RX ADMIN — BUDESONIDE INHALATION 1000 MCG: 0.5 SUSPENSION RESPIRATORY (INHALATION) at 20:31

## 2024-11-07 RX ADMIN — IPRATROPIUM BROMIDE AND ALBUTEROL SULFATE 1 DOSE: 2.5; .5 SOLUTION RESPIRATORY (INHALATION) at 07:43

## 2024-11-07 RX ADMIN — VANCOMYCIN HYDROCHLORIDE 1250 MG: 1.25 INJECTION, SOLUTION INTRAVITREAL at 22:46

## 2024-11-07 RX ADMIN — IPRATROPIUM BROMIDE AND ALBUTEROL SULFATE 1 DOSE: 2.5; .5 SOLUTION RESPIRATORY (INHALATION) at 20:31

## 2024-11-07 RX ADMIN — METHYLPREDNISOLONE SODIUM SUCCINATE 20 MG: 40 INJECTION INTRAMUSCULAR; INTRAVENOUS at 02:11

## 2024-11-07 RX ADMIN — DEXMEDETOMIDINE HYDROCHLORIDE 0.2 MCG/KG/HR: 400 INJECTION, SOLUTION INTRAVENOUS at 19:15

## 2024-11-07 ASSESSMENT — PULMONARY FUNCTION TESTS
PIF_VALUE: 18
PIF_VALUE: 20
PIF_VALUE: 13
PIF_VALUE: 16
PIF_VALUE: 13
PIF_VALUE: 12
PIF_VALUE: 13
PIF_VALUE: 13
PIF_VALUE: 20
PIF_VALUE: 20
PIF_VALUE: 19
PIF_VALUE: 7
PIF_VALUE: 13
PIF_VALUE: 18
PIF_VALUE: 13
PIF_VALUE: 18
PIF_VALUE: 14
PIF_VALUE: 11

## 2024-11-07 ASSESSMENT — PAIN SCALES - GENERAL
PAINLEVEL_OUTOF10: 0

## 2024-11-07 NOTE — PLAN OF CARE
Problem: Discharge Planning  Goal: Discharge to home or other facility with appropriate resources  Outcome: Progressing     Problem: Pain  Goal: Verbalizes/displays adequate comfort level or baseline comfort level  Outcome: Progressing     Problem: Safety - Adult  Goal: Free from fall injury  Outcome: Progressing  Flowsheets (Taken 11/7/2024 1454)  Free From Fall Injury: Instruct family/caregiver on patient safety     Problem: Skin/Tissue Integrity  Goal: Absence of new skin breakdown  Description: 1.  Monitor for areas of redness and/or skin breakdown  2.  Assess vascular access sites hourly  3.  Every 4-6 hours minimum:  Change oxygen saturation probe site  4.  Every 4-6 hours:  If on nasal continuous positive airway pressure, respiratory therapy assess nares and determine need for appliance change or resting period.  Outcome: Progressing     Problem: Safety - Medical Restraint  Goal: Remains free of injury from restraints (Restraint for Interference with Medical Device)  Description: INTERVENTIONS:  1. Determine that other, less restrictive measures have been tried or would not be effective before applying the restraint  2. Evaluate the patient's condition at the time of restraint application  3. Inform patient/family regarding the reason for restraint  4. Q2H: Monitor safety, psychosocial status, comfort, nutrition and hydration  Outcome: Completed  Flowsheets  Taken 11/7/2024 0800 by Hugh Hudson RN  Remains free of injury from restraints (restraint for interference with medical device):   Determine that other, less restrictive measures have been tried or would not be effective before applying the restraint   Every 2 hours: Monitor safety, psychosocial status, comfort, nutrition and hydration

## 2024-11-07 NOTE — PROCEDURES
PROCEDURE NOTE  Date: 2024   Name: Shannon Zeng  YOB: 1965    Procedures          POWER MIDLINE PROCEDURE NOTE  Chart reviewed for allergies, diagnosis, labs, known contraindications, reason for line placement and planned length of treatment.  Insertion procedure discussed with patient/family member.  Informed consent not required for Midline placement.  Three patient identifiers - Patient name,   and MRN -  completed &  confirmed verbally.   Hat, mask and eye shield donned.  Midline site scrubbed with Chloraprep  One-Step applicator  for 30 seconds x 1.   Hand Hygiene  performed with 3% Chlorhexidine surgical scrub x1 min prior to  Sterile gloves, sterile gown being donned.  Patient draped using maximal sterile barrier technique ( head to toe ).  Midline site scrubbed a 2nd time with Chloraprep One-Step applicator x 30 sec. Vein located by Ultra Sound and site marked with sterile pen.  1% Lidocaine 5 ml injected intradermal pre-insertion for trimmed Midline.  Midline inserted.  Positive brisk blood return obtained.  Valve applied to lumen.  Midline flushed with 10 mls  0.9% Sterile Sodium Chloride. Midline flushes easily with no resistance.   Skin prep applied to site. Catheter secured with non-sutured locking device per hospital protocol.  Bio-patch/CHG impregnated sterile tegaderm dressing applied. Alcohol Swab Caps applied to valve.   Sterile field maintained during procedure. Positioning wire accounted for post procedure. Pt. Response to procedure, tolerated well. Appearance of site: Clean dry and intact without bleeding or edema. All edges of Tegaderm occlusive.   Site marked with date and initials of RN placing line.Top 2 side rails in up position, call button in reach, RN notified of all of the above.  A Power Midline 13 CM placed in the GODWIN cephalic vein. 0 cm showing from insertion site.

## 2024-11-07 NOTE — TELEPHONE ENCOUNTER
S/W Erwin, .  Patient is in ICU at [facility] with a possible torn esophagus.

## 2024-11-08 ENCOUNTER — APPOINTMENT (OUTPATIENT)
Dept: GENERAL RADIOLOGY | Age: 59
DRG: 871 | End: 2024-11-08
Attending: INTERNAL MEDICINE
Payer: COMMERCIAL

## 2024-11-08 LAB
ALBUMIN SERPL-MCNC: 3.8 G/DL (ref 3.4–5)
ANION GAP SERPL CALCULATED.3IONS-SCNC: 10 MMOL/L (ref 3–16)
BASOPHILS # BLD: 0 K/UL (ref 0–0.2)
BASOPHILS NFR BLD: 0.1 %
BUN SERPL-MCNC: 10 MG/DL (ref 7–20)
CALCIUM SERPL-MCNC: 8.8 MG/DL (ref 8.3–10.6)
CHLORIDE SERPL-SCNC: 106 MMOL/L (ref 99–110)
CO2 SERPL-SCNC: 24 MMOL/L (ref 21–32)
CREAT SERPL-MCNC: 0.6 MG/DL (ref 0.6–1.2)
DEPRECATED RDW RBC AUTO: 14 % (ref 12.4–15.4)
EOSINOPHIL # BLD: 0 K/UL (ref 0–0.6)
EOSINOPHIL NFR BLD: 0 %
GFR SERPLBLD CREATININE-BSD FMLA CKD-EPI: >90 ML/MIN/{1.73_M2}
GLUCOSE SERPL-MCNC: 112 MG/DL (ref 70–99)
HCT VFR BLD AUTO: 38.8 % (ref 36–48)
HGB BLD-MCNC: 12.8 G/DL (ref 12–16)
LYMPHOCYTES # BLD: 0.8 K/UL (ref 1–5.1)
LYMPHOCYTES NFR BLD: 4.5 %
MAGNESIUM SERPL-MCNC: 2.16 MG/DL (ref 1.8–2.4)
MCH RBC QN AUTO: 30 PG (ref 26–34)
MCHC RBC AUTO-ENTMCNC: 32.9 G/DL (ref 31–36)
MCV RBC AUTO: 91.2 FL (ref 80–100)
MONOCYTES # BLD: 1 K/UL (ref 0–1.3)
MONOCYTES NFR BLD: 5.7 %
MRSA SPEC QL CULT: NORMAL
NEUTROPHILS # BLD: 15.3 K/UL (ref 1.7–7.7)
NEUTROPHILS NFR BLD: 89.7 %
PHOSPHATE SERPL-MCNC: 2.6 MG/DL (ref 2.5–4.9)
PLATELET # BLD AUTO: 249 K/UL (ref 135–450)
PMV BLD AUTO: 8.2 FL (ref 5–10.5)
POTASSIUM SERPL-SCNC: 4.2 MMOL/L (ref 3.5–5.1)
RBC # BLD AUTO: 4.26 M/UL (ref 4–5.2)
SODIUM SERPL-SCNC: 140 MMOL/L (ref 136–145)
VANCOMYCIN SERPL-MCNC: 15.5 UG/ML
WBC # BLD AUTO: 17 K/UL (ref 4–11)

## 2024-11-08 PROCEDURE — 6360000002 HC RX W HCPCS

## 2024-11-08 PROCEDURE — 94761 N-INVAS EAR/PLS OXIMETRY MLT: CPT

## 2024-11-08 PROCEDURE — 71045 X-RAY EXAM CHEST 1 VIEW: CPT

## 2024-11-08 PROCEDURE — 6360000002 HC RX W HCPCS: Performed by: INTERNAL MEDICINE

## 2024-11-08 PROCEDURE — 80069 RENAL FUNCTION PANEL: CPT

## 2024-11-08 PROCEDURE — 6370000000 HC RX 637 (ALT 250 FOR IP): Performed by: INTERNAL MEDICINE

## 2024-11-08 PROCEDURE — 97166 OT EVAL MOD COMPLEX 45 MIN: CPT

## 2024-11-08 PROCEDURE — 80202 ASSAY OF VANCOMYCIN: CPT

## 2024-11-08 PROCEDURE — 99233 SBSQ HOSP IP/OBS HIGH 50: CPT | Performed by: INTERNAL MEDICINE

## 2024-11-08 PROCEDURE — 85025 COMPLETE CBC W/AUTO DIFF WBC: CPT

## 2024-11-08 PROCEDURE — 97116 GAIT TRAINING THERAPY: CPT

## 2024-11-08 PROCEDURE — 6370000000 HC RX 637 (ALT 250 FOR IP)

## 2024-11-08 PROCEDURE — 94640 AIRWAY INHALATION TREATMENT: CPT

## 2024-11-08 PROCEDURE — 94669 MECHANICAL CHEST WALL OSCILL: CPT

## 2024-11-08 PROCEDURE — 97162 PT EVAL MOD COMPLEX 30 MIN: CPT

## 2024-11-08 PROCEDURE — 2580000003 HC RX 258

## 2024-11-08 PROCEDURE — 83735 ASSAY OF MAGNESIUM: CPT

## 2024-11-08 PROCEDURE — 2580000003 HC RX 258: Performed by: INTERNAL MEDICINE

## 2024-11-08 PROCEDURE — 97530 THERAPEUTIC ACTIVITIES: CPT

## 2024-11-08 PROCEDURE — 2700000000 HC OXYGEN THERAPY PER DAY

## 2024-11-08 PROCEDURE — 2060000000 HC ICU INTERMEDIATE R&B

## 2024-11-08 PROCEDURE — 97535 SELF CARE MNGMENT TRAINING: CPT

## 2024-11-08 PROCEDURE — 36415 COLL VENOUS BLD VENIPUNCTURE: CPT

## 2024-11-08 RX ORDER — PREDNISONE 20 MG/1
20 TABLET ORAL 2 TIMES DAILY
Status: DISCONTINUED | OUTPATIENT
Start: 2024-11-08 | End: 2024-11-10 | Stop reason: HOSPADM

## 2024-11-08 RX ORDER — ACETAMINOPHEN 160 MG/5ML
650 LIQUID ORAL EVERY 6 HOURS PRN
Status: DISCONTINUED | OUTPATIENT
Start: 2024-11-08 | End: 2024-11-10 | Stop reason: HOSPADM

## 2024-11-08 RX ORDER — HYDROMORPHONE HYDROCHLORIDE 1 MG/ML
0.25 INJECTION, SOLUTION INTRAMUSCULAR; INTRAVENOUS; SUBCUTANEOUS EVERY 4 HOURS PRN
Status: DISCONTINUED | OUTPATIENT
Start: 2024-11-08 | End: 2024-11-10 | Stop reason: HOSPADM

## 2024-11-08 RX ORDER — OXYCODONE HYDROCHLORIDE 5 MG/1
10 TABLET ORAL EVERY 4 HOURS PRN
Status: DISCONTINUED | OUTPATIENT
Start: 2024-11-08 | End: 2024-11-10 | Stop reason: HOSPADM

## 2024-11-08 RX ORDER — OXYCODONE HYDROCHLORIDE 5 MG/1
5 TABLET ORAL EVERY 4 HOURS PRN
Status: DISCONTINUED | OUTPATIENT
Start: 2024-11-08 | End: 2024-11-10 | Stop reason: HOSPADM

## 2024-11-08 RX ORDER — KETOROLAC TROMETHAMINE 15 MG/ML
15 INJECTION, SOLUTION INTRAMUSCULAR; INTRAVENOUS EVERY 6 HOURS PRN
Status: DISCONTINUED | OUTPATIENT
Start: 2024-11-08 | End: 2024-11-08

## 2024-11-08 RX ORDER — IPRATROPIUM BROMIDE AND ALBUTEROL SULFATE 2.5; .5 MG/3ML; MG/3ML
1 SOLUTION RESPIRATORY (INHALATION) EVERY 4 HOURS PRN
Status: DISCONTINUED | OUTPATIENT
Start: 2024-11-08 | End: 2024-11-09

## 2024-11-08 RX ADMIN — IPRATROPIUM BROMIDE AND ALBUTEROL SULFATE 1 DOSE: 2.5; .5 SOLUTION RESPIRATORY (INHALATION) at 12:51

## 2024-11-08 RX ADMIN — HYDROXYZINE HYDROCHLORIDE 25 MG: 25 TABLET ORAL at 02:21

## 2024-11-08 RX ADMIN — ACETAMINOPHEN 650 MG: 650 SOLUTION ORAL at 08:00

## 2024-11-08 RX ADMIN — BUDESONIDE INHALATION 1000 MCG: 0.5 SUSPENSION RESPIRATORY (INHALATION) at 20:09

## 2024-11-08 RX ADMIN — VANCOMYCIN HYDROCHLORIDE 1250 MG: 1.25 INJECTION, SOLUTION INTRAVITREAL at 10:16

## 2024-11-08 RX ADMIN — FLUCONAZOLE 400 MG: 2 INJECTION, SOLUTION INTRAVENOUS at 06:16

## 2024-11-08 RX ADMIN — HYDROMORPHONE HYDROCHLORIDE 0.25 MG: 1 INJECTION, SOLUTION INTRAMUSCULAR; INTRAVENOUS; SUBCUTANEOUS at 10:03

## 2024-11-08 RX ADMIN — OXYCODONE 5 MG: 5 TABLET ORAL at 19:09

## 2024-11-08 RX ADMIN — ACETAMINOPHEN 650 MG: 650 SOLUTION ORAL at 00:59

## 2024-11-08 RX ADMIN — BUDESONIDE INHALATION 1000 MCG: 0.5 SUSPENSION RESPIRATORY (INHALATION) at 08:15

## 2024-11-08 RX ADMIN — METHYLPREDNISOLONE SODIUM SUCCINATE 20 MG: 40 INJECTION INTRAMUSCULAR; INTRAVENOUS at 13:55

## 2024-11-08 RX ADMIN — LEVOFLOXACIN 750 MG: 750 INJECTION, SOLUTION INTRAVENOUS at 21:49

## 2024-11-08 RX ADMIN — SODIUM CHLORIDE, PRESERVATIVE FREE 10 ML: 5 INJECTION INTRAVENOUS at 08:01

## 2024-11-08 RX ADMIN — ALBUTEROL SULFATE 2.5 MG: 2.5 SOLUTION RESPIRATORY (INHALATION) at 02:10

## 2024-11-08 RX ADMIN — METHYLPREDNISOLONE SODIUM SUCCINATE 20 MG: 40 INJECTION INTRAMUSCULAR; INTRAVENOUS at 03:03

## 2024-11-08 RX ADMIN — PREDNISONE 20 MG: 20 TABLET ORAL at 21:35

## 2024-11-08 RX ADMIN — VANCOMYCIN HYDROCHLORIDE 1250 MG: 1.25 INJECTION, SOLUTION INTRAVITREAL at 21:39

## 2024-11-08 RX ADMIN — IPRATROPIUM BROMIDE AND ALBUTEROL SULFATE 1 DOSE: 2.5; .5 SOLUTION RESPIRATORY (INHALATION) at 08:15

## 2024-11-08 RX ADMIN — SODIUM CHLORIDE, PRESERVATIVE FREE 10 ML: 5 INJECTION INTRAVENOUS at 21:35

## 2024-11-08 RX ADMIN — OXYCODONE 5 MG: 5 TABLET ORAL at 13:49

## 2024-11-08 RX ADMIN — SODIUM CHLORIDE, PRESERVATIVE FREE 40 MG: 5 INJECTION INTRAVENOUS at 08:01

## 2024-11-08 RX ADMIN — IPRATROPIUM BROMIDE AND ALBUTEROL SULFATE 1 DOSE: 2.5; .5 SOLUTION RESPIRATORY (INHALATION) at 16:56

## 2024-11-08 RX ADMIN — HYDROXYZINE HYDROCHLORIDE 25 MG: 25 TABLET ORAL at 21:36

## 2024-11-08 ASSESSMENT — PAIN SCALES - GENERAL
PAINLEVEL_OUTOF10: 6
PAINLEVEL_OUTOF10: 4
PAINLEVEL_OUTOF10: 4
PAINLEVEL_OUTOF10: 0
PAINLEVEL_OUTOF10: 4
PAINLEVEL_OUTOF10: 6
PAINLEVEL_OUTOF10: 5
PAINLEVEL_OUTOF10: 10

## 2024-11-08 ASSESSMENT — PAIN DESCRIPTION - DESCRIPTORS
DESCRIPTORS: ACHING
DESCRIPTORS: DISCOMFORT;ACHING
DESCRIPTORS: DISCOMFORT
DESCRIPTORS: ACHING
DESCRIPTORS: DISCOMFORT
DESCRIPTORS: DISCOMFORT;ACHING;SHARP

## 2024-11-08 ASSESSMENT — PAIN DESCRIPTION - LOCATION
LOCATION: FLANK
LOCATION: ABDOMEN
LOCATION: ABDOMEN;BACK
LOCATION: ABDOMEN;BACK
LOCATION: ABDOMEN
LOCATION: ABDOMEN;BACK
LOCATION: FLANK

## 2024-11-08 ASSESSMENT — PAIN - FUNCTIONAL ASSESSMENT: PAIN_FUNCTIONAL_ASSESSMENT: PREVENTS OR INTERFERES SOME ACTIVE ACTIVITIES AND ADLS

## 2024-11-08 ASSESSMENT — PAIN DESCRIPTION - ORIENTATION
ORIENTATION: LEFT

## 2024-11-08 ASSESSMENT — PAIN DESCRIPTION - PAIN TYPE: TYPE: ACUTE PAIN;SURGICAL PAIN

## 2024-11-08 ASSESSMENT — PAIN DESCRIPTION - ONSET: ONSET: ON-GOING

## 2024-11-08 ASSESSMENT — PAIN DESCRIPTION - FREQUENCY: FREQUENCY: CONTINUOUS

## 2024-11-08 NOTE — PLAN OF CARE
Problem: Discharge Planning  Goal: Discharge to home or other facility with appropriate resources  11/8/2024 0125 by Celso Ferrara RN  Outcome: Progressing  11/7/2024 1457 by Hugh Hudson RN  Outcome: Progressing     Problem: Pain  Goal: Verbalizes/displays adequate comfort level or baseline comfort level  11/8/2024 0125 by Celso Ferrara RN  Outcome: Progressing  11/7/2024 1457 by Hugh Hudson RN  Outcome: Progressing     Problem: Safety - Adult  Goal: Free from fall injury  11/8/2024 0125 by Celso Ferrara RN  Outcome: Progressing  11/7/2024 1457 by Hugh Hudson RN  Outcome: Progressing  Flowsheets (Taken 11/7/2024 1454)  Free From Fall Injury: Instruct family/caregiver on patient safety     Problem: Skin/Tissue Integrity  Goal: Absence of new skin breakdown  Description: 1.  Monitor for areas of redness and/or skin breakdown  2.  Assess vascular access sites hourly  3.  Every 4-6 hours minimum:  Change oxygen saturation probe site  4.  Every 4-6 hours:  If on nasal continuous positive airway pressure, respiratory therapy assess nares and determine need for appliance change or resting period.  11/8/2024 0125 by Celso Ferrara RN  Outcome: Progressing  11/7/2024 1457 by Hugh Hudson RN  Outcome: Progressing

## 2024-11-09 LAB
ALBUMIN SERPL-MCNC: 3.6 G/DL (ref 3.4–5)
ANION GAP SERPL CALCULATED.3IONS-SCNC: 10 MMOL/L (ref 3–16)
BASOPHILS # BLD: 0 K/UL (ref 0–0.2)
BASOPHILS NFR BLD: 0.1 %
BUN SERPL-MCNC: 12 MG/DL (ref 7–20)
CALCIUM SERPL-MCNC: 8.7 MG/DL (ref 8.3–10.6)
CHLORIDE SERPL-SCNC: 106 MMOL/L (ref 99–110)
CO2 SERPL-SCNC: 24 MMOL/L (ref 21–32)
CREAT SERPL-MCNC: 0.6 MG/DL (ref 0.6–1.2)
DEPRECATED RDW RBC AUTO: 13.7 % (ref 12.4–15.4)
EOSINOPHIL # BLD: 0 K/UL (ref 0–0.6)
EOSINOPHIL NFR BLD: 0.1 %
GFR SERPLBLD CREATININE-BSD FMLA CKD-EPI: >90 ML/MIN/{1.73_M2}
GLUCOSE SERPL-MCNC: 102 MG/DL (ref 70–99)
HCT VFR BLD AUTO: 36.3 % (ref 36–48)
HGB BLD-MCNC: 12.1 G/DL (ref 12–16)
LYMPHOCYTES # BLD: 1.1 K/UL (ref 1–5.1)
LYMPHOCYTES NFR BLD: 8.4 %
MAGNESIUM SERPL-MCNC: 2.22 MG/DL (ref 1.8–2.4)
MCH RBC QN AUTO: 30.2 PG (ref 26–34)
MCHC RBC AUTO-ENTMCNC: 33.5 G/DL (ref 31–36)
MCV RBC AUTO: 90.1 FL (ref 80–100)
MONOCYTES # BLD: 0.9 K/UL (ref 0–1.3)
MONOCYTES NFR BLD: 7.3 %
NEUTROPHILS # BLD: 10.6 K/UL (ref 1.7–7.7)
NEUTROPHILS NFR BLD: 84.1 %
PHOSPHATE SERPL-MCNC: 3.3 MG/DL (ref 2.5–4.9)
PLATELET # BLD AUTO: 247 K/UL (ref 135–450)
PMV BLD AUTO: 7.4 FL (ref 5–10.5)
POTASSIUM SERPL-SCNC: 4.1 MMOL/L (ref 3.5–5.1)
RBC # BLD AUTO: 4.03 M/UL (ref 4–5.2)
SODIUM SERPL-SCNC: 140 MMOL/L (ref 136–145)
WBC # BLD AUTO: 12.6 K/UL (ref 4–11)

## 2024-11-09 PROCEDURE — 85025 COMPLETE CBC W/AUTO DIFF WBC: CPT

## 2024-11-09 PROCEDURE — 6360000002 HC RX W HCPCS

## 2024-11-09 PROCEDURE — 80069 RENAL FUNCTION PANEL: CPT

## 2024-11-09 PROCEDURE — 6370000000 HC RX 637 (ALT 250 FOR IP): Performed by: INTERNAL MEDICINE

## 2024-11-09 PROCEDURE — 94761 N-INVAS EAR/PLS OXIMETRY MLT: CPT

## 2024-11-09 PROCEDURE — 2580000003 HC RX 258

## 2024-11-09 PROCEDURE — 2060000000 HC ICU INTERMEDIATE R&B

## 2024-11-09 PROCEDURE — 6370000000 HC RX 637 (ALT 250 FOR IP)

## 2024-11-09 PROCEDURE — 94640 AIRWAY INHALATION TREATMENT: CPT

## 2024-11-09 PROCEDURE — 83735 ASSAY OF MAGNESIUM: CPT

## 2024-11-09 PROCEDURE — 36415 COLL VENOUS BLD VENIPUNCTURE: CPT

## 2024-11-09 PROCEDURE — 99233 SBSQ HOSP IP/OBS HIGH 50: CPT | Performed by: INTERNAL MEDICINE

## 2024-11-09 RX ORDER — GUAIFENESIN/DEXTROMETHORPHAN 100-10MG/5
5 SYRUP ORAL EVERY 4 HOURS PRN
Status: DISCONTINUED | OUTPATIENT
Start: 2024-11-09 | End: 2024-11-10 | Stop reason: HOSPADM

## 2024-11-09 RX ORDER — IPRATROPIUM BROMIDE AND ALBUTEROL SULFATE 2.5; .5 MG/3ML; MG/3ML
1 SOLUTION RESPIRATORY (INHALATION)
Status: DISCONTINUED | OUTPATIENT
Start: 2024-11-09 | End: 2024-11-10 | Stop reason: HOSPADM

## 2024-11-09 RX ADMIN — ENOXAPARIN SODIUM 40 MG: 100 INJECTION SUBCUTANEOUS at 17:47

## 2024-11-09 RX ADMIN — IPRATROPIUM BROMIDE AND ALBUTEROL SULFATE 1 DOSE: 2.5; .5 SOLUTION RESPIRATORY (INHALATION) at 20:37

## 2024-11-09 RX ADMIN — OXYCODONE 5 MG: 5 TABLET ORAL at 11:16

## 2024-11-09 RX ADMIN — LEVOFLOXACIN 750 MG: 750 INJECTION, SOLUTION INTRAVENOUS at 22:06

## 2024-11-09 RX ADMIN — HYDROXYZINE HYDROCHLORIDE 25 MG: 25 TABLET ORAL at 21:55

## 2024-11-09 RX ADMIN — HYDROXYZINE HYDROCHLORIDE 25 MG: 25 TABLET ORAL at 08:38

## 2024-11-09 RX ADMIN — PREDNISONE 20 MG: 20 TABLET ORAL at 21:55

## 2024-11-09 RX ADMIN — ALBUTEROL SULFATE 2.5 MG: 2.5 SOLUTION RESPIRATORY (INHALATION) at 09:20

## 2024-11-09 RX ADMIN — VANCOMYCIN HYDROCHLORIDE 1250 MG: 1.25 INJECTION, SOLUTION INTRAVITREAL at 10:46

## 2024-11-09 RX ADMIN — ALBUTEROL SULFATE 2.5 MG: 2.5 SOLUTION RESPIRATORY (INHALATION) at 16:04

## 2024-11-09 RX ADMIN — BUDESONIDE INHALATION 1000 MCG: 0.5 SUSPENSION RESPIRATORY (INHALATION) at 20:37

## 2024-11-09 RX ADMIN — SODIUM CHLORIDE, PRESERVATIVE FREE 10 ML: 5 INJECTION INTRAVENOUS at 08:44

## 2024-11-09 RX ADMIN — BUDESONIDE INHALATION 1000 MCG: 0.5 SUSPENSION RESPIRATORY (INHALATION) at 09:10

## 2024-11-09 RX ADMIN — GUAIFENESIN SYRUP AND DEXTROMETHORPHAN 5 ML: 100; 10 SYRUP ORAL at 14:40

## 2024-11-09 RX ADMIN — SODIUM CHLORIDE, PRESERVATIVE FREE 10 ML: 5 INJECTION INTRAVENOUS at 21:55

## 2024-11-09 RX ADMIN — PREDNISONE 20 MG: 20 TABLET ORAL at 08:38

## 2024-11-09 RX ADMIN — SODIUM CHLORIDE, PRESERVATIVE FREE 40 MG: 5 INJECTION INTRAVENOUS at 08:38

## 2024-11-09 RX ADMIN — SODIUM CHLORIDE, PRESERVATIVE FREE 10 ML: 5 INJECTION INTRAVENOUS at 22:04

## 2024-11-09 ASSESSMENT — PAIN SCALES - GENERAL
PAINLEVEL_OUTOF10: 0
PAINLEVEL_OUTOF10: 6
PAINLEVEL_OUTOF10: 0
PAINLEVEL_OUTOF10: 3

## 2024-11-09 ASSESSMENT — PAIN DESCRIPTION - DESCRIPTORS: DESCRIPTORS: ACHING;DULL

## 2024-11-09 ASSESSMENT — PAIN DESCRIPTION - LOCATION: LOCATION: CHEST

## 2024-11-09 ASSESSMENT — PAIN DESCRIPTION - PAIN TYPE: TYPE: ACUTE PAIN

## 2024-11-09 ASSESSMENT — PAIN - FUNCTIONAL ASSESSMENT: PAIN_FUNCTIONAL_ASSESSMENT: ACTIVITIES ARE NOT PREVENTED

## 2024-11-09 ASSESSMENT — PAIN DESCRIPTION - ONSET: ONSET: ON-GOING

## 2024-11-09 ASSESSMENT — PAIN DESCRIPTION - FREQUENCY: FREQUENCY: CONTINUOUS

## 2024-11-09 ASSESSMENT — PAIN DESCRIPTION - ORIENTATION: ORIENTATION: LEFT

## 2024-11-09 NOTE — CONSULTS
[facility] -  Clinical Pharmacy Note    Vancomycin - Management by Pharmacy    Consult Date(s): 11/6/24  Consulting Provider(s): Masood Rivera, DO     Assessment / Plan  CAP + Pneumomediastinum - Vancomycin  Concurrent Antimicrobials: levofloxacin, fluconazole  Day of Vanc Therapy / Ordered Duration: 1 of 7  Current Dosing Method: Bayesian-Guided AUC Dosing  Therapeutic Goal: -600 mg/L*hr  Current Dose / Plan:   Renal function appears to be at baseline, Scr today = 0.5 mg/dL  Loading dose of 1750 mg x 1 followed by 1250 mg q12h  AUCss = 512 mg/L*hr, Tr = 12.5 mg/L  Will plan for random level in ~48h to refine kinetics  Will continue to monitor clinical condition and make adjustments to regimen as appropriate.    Thank you for consulting pharmacy,    Carson Paris, PharmD  Main Pharmacy: [id number]  11/6/2024 9:37 AM      Interval update:  patient intubated and sedated on propofol/fentanyl, BP soft with norepi gtt running    Subjective/Objective:   Shannon Zeng is a 59 y.o. female with a PMHx significant for moderate persistent asthma, vocal cord dysfunction, and esophageal webs requiring dilation who presents as a transfer from [facility] with shortness of breath, found to have pneumothorax now s/p chest tube placement.    Ht Readings from Last 1 Encounters:   11/05/24 1.651 m (5' 5\")     Wt Readings from Last 1 Encounters:   11/06/24 72.1 kg (158 lb 15.2 oz)     Current & Prior Antimicrobial Regimen(s):  Fluconazole (11/6 - current)  Levofloxacin (11/5 - current)  Vancomycin  1750 mg x1 (11/6)  1250 mg q12h (11/6 - current)    Vancomycin Level(s) / Doses:    Date Time Dose Type of Level / Level Interpretation                 Note: Serum levels collected for AUC-based dosing may be high if collected in close proximity to the dose administered. This is not necessarily indicative of toxicity.    Cultures & Sensitivities:    Date Site Micro Susceptibility / Result   11/6 Sputum cx Sent    11/6 
Thoracic Surgery   Resident Consult Note    Reason for Consult: SOB, L pneumothorax, concern for esophageal perforation    History of Present Illness:   Shannon Zeng is a 59 y.o. female with Hx of moderate persistent asthma, steroid allergy (SOB), penicillin allergy (anaphylaxis), vocal cord dysfunction, and esophageal webs requiring dilation (2016) who presents as a transfer from [facility] with shortness of breath, pneumomediastinum, left spontaneous pneumothorax with chest tube placement on -20 of suction. Thoracic surgery consulted for management of CT and due to concern of esophageal perforation.     Per chart review, she presented to [facility] with worsening SOB not relieved with home albuterol inhaler and hypoxia of 60% on 11/5. Patient continued to decompensate on HFNC and required intubation, CXR showed L pneumothorax. Labs at this time were significant for VBG: pH 7.339, pCO2 42.9, bicarb 22.6, leukocytosis at 15 and placed on fluconazole and levofloxacin.    CT PE 11/5 shows small left sided hydropneumothorax, pneumomediastinum, Patchy bilat infiltrates in the lungs with lower lobar collapse bilaterally, suspicious for multilobar pneumonia.    At [facility], patient is currently intubated due to acute hypoxic respiratory failure. She is able to communicate via writing and reports no chest pain. SBP 80s-110s on 3 levo, O2 sat 92%.  WBC 13.9. CXR small L pneumothorax.       Past Medical History:        Diagnosis Date    Asthma     Migraines     Vocal cord dysfunction        Past Surgical History:        Procedure Laterality Date    CERVICAL DISCECTOMY      CERVICAL FUSION  3/29/10    C5,6,7; [facility]    SINUS SURGERY      x2    TUBAL LIGATION         Allergies:  Breo ellipta [fluticasone furoate-vilanterol], Penicillins, Pulmicort [budesonide], Avelox [moxifloxacin], Cephalosporins, Clindamycin/lincomycin, Codeine, Levaquin [levofloxacin in d5w], Lincomycin hcl, Prednisone, Sulfa antibiotics, and 
Vancomycin has been discontinued. Pharmacy will sign off consult. If medication dosing is resumed, please re-consult pharmacy.    Maggie Bustamante PharmD, [facility] 11/9/2024 11:11 AM      
y.o. female with asthma, history of vocal cord dysfunction, ACDF, esophageal webbing presented with shortness of breath and chest pain to [facility] and was found to have acute hypoxemic respiratory failure and a pneumomediastinum with pneumothorax.    Vent Mode: CPAP/PS Resp Rate (Set): 14 bpm/Vt (Set, mL): 450 mL/ /FiO2 : 40 %  PEEP/CPAP (cmH2O): 5   Peak Inspiratory Pressure (cmH2O): 13 cmH2O  ETCO2 (mmHg): 47 mmHg   Recent Labs     11/06/24  0458 11/06/24  1137   PHART 7.339* 7.321*   YHY7MCD 44.8 45.6*   PO2ART 97.6 71.3*     Patient intubate and sedated for my examination.  Blood gas at the other facility was well compensated prior to intubation, with the exception of hypoxia, but she was reportedly in severe discomfort and distress so was intubated and transferred to [facility] for thoracic surgery evaluation.  She had a 32 F chest tube placed on the left.  I don't see any air leak or tidalling.  Oxygen requirements have come down to 40%.     CT is impressive with complete opacification of the lower lobes bilaterally, GGOs in the RUL and diffusely in the JEAN, hydropneumothorax on the left and extensive pneumomediastinum.  She is wheezing on exam and has a prolonged expiratory phase.  Spoke with thoracic surgery and we agree that based on presentation the likelihood of an esophageal injury causing the pneumomediastinum etc is very very low.  Instead is much more likely to be related to her obstructive lung disease/asthma.    Unfortunately treating her asthma is going to be difficult as she has a long history of reported allergies/intolerances of inhalers, specifically steroids.  She saw Dr. Birch with  until 2016 and then switched to Dr. Hendrix.  I included some comments from Dr. Birch's note in 2016 below:    \"On Albuterol and Ipratropium bromide  Used to be on Advair, did not work very well, she tells me that she did not tolerate multiple other inhalers (Symbicort, pulmicort), Singulair gave her ear

## 2024-11-09 NOTE — PLAN OF CARE
Problem: Discharge Planning  Goal: Discharge to home or other facility with appropriate resources  11/8/2024 2230 by Sue Thurston RN  Outcome: Progressing  Flowsheets (Taken 11/8/2024 2230)  Discharge to home or other facility with appropriate resources:   Identify barriers to discharge with patient and caregiver   Identify discharge learning needs (meds, wound care, etc)   Arrange for needed discharge resources and transportation as appropriate  11/8/2024 2043 by Melissa Foy RN  Outcome: Progressing     Problem: Pain  Goal: Verbalizes/displays adequate comfort level or baseline comfort level  11/8/2024 2230 by Sue Thurston RN  Outcome: Progressing  Flowsheets (Taken 11/8/2024 2230)  Verbalizes/displays adequate comfort level or baseline comfort level:   Encourage patient to monitor pain and request assistance   Assess pain using appropriate pain scale   Administer analgesics based on type and severity of pain and evaluate response   Implement non-pharmacological measures as appropriate and evaluate response  11/8/2024 2043 by Melissa Foy RN  Outcome: Progressing     Problem: Safety - Adult  Goal: Free from fall injury  11/8/2024 2230 by Sue Thurston RN  Outcome: Progressing  Flowsheets (Taken 11/8/2024 2230)  Free From Fall Injury: Instruct family/caregiver on patient safety  11/8/2024 2043 by Melissa Foy RN  Outcome: Progressing     Problem: Skin/Tissue Integrity  Goal: Absence of new skin breakdown  Description: 1.  Monitor for areas of redness and/or skin breakdown  2.  Assess vascular access sites hourly  3.  Every 4-6 hours minimum:  Change oxygen saturation probe site  4.  Every 4-6 hours:  If on nasal continuous positive airway pressure, respiratory therapy assess nares and determine need for appliance change or resting period.  11/8/2024 2230 by Sue Thurston RN  Outcome: Progressing  11/8/2024 2043 by Melissa Foy RN  Outcome: Progressing

## 2024-11-09 NOTE — PLAN OF CARE
Problem: Discharge Planning  Goal: Discharge to home or other facility with appropriate resources  11/9/2024 0923 by Jarett Demarco, RN  Outcome: Progressing   Pt plans to return home at d/c. CM/SW following for d/c needs.  Problem: Safety - Adult  Goal: Free from fall injury  11/9/2024 0923 by Jarett Demarco, RN  Outcome: Progressing   All fall precautions in place. Bed locked and in lowest position with alarm on. Overbed table and personal belonings within reach. Call light within reach and patient instructed to use call light for assistance. Non-skid socks on.

## 2024-11-10 VITALS
BODY MASS INDEX: 26.74 KG/M2 | WEIGHT: 160.5 LBS | OXYGEN SATURATION: 92 % | RESPIRATION RATE: 16 BRPM | HEART RATE: 80 BPM | HEIGHT: 65 IN | SYSTOLIC BLOOD PRESSURE: 137 MMHG | TEMPERATURE: 98.1 F | DIASTOLIC BLOOD PRESSURE: 81 MMHG

## 2024-11-10 LAB
BASOPHILS # BLD: 0 K/UL (ref 0–0.2)
BASOPHILS NFR BLD: 0.1 %
DEPRECATED RDW RBC AUTO: 13.7 % (ref 12.4–15.4)
EOSINOPHIL # BLD: 0 K/UL (ref 0–0.6)
EOSINOPHIL NFR BLD: 0.3 %
HCT VFR BLD AUTO: 37.7 % (ref 36–48)
HGB BLD-MCNC: 12.7 G/DL (ref 12–16)
LYMPHOCYTES # BLD: 1.4 K/UL (ref 1–5.1)
LYMPHOCYTES NFR BLD: 14.8 %
MCH RBC QN AUTO: 30 PG (ref 26–34)
MCHC RBC AUTO-ENTMCNC: 33.6 G/DL (ref 31–36)
MCV RBC AUTO: 89.2 FL (ref 80–100)
MONOCYTES # BLD: 0.8 K/UL (ref 0–1.3)
MONOCYTES NFR BLD: 8.2 %
NEUTROPHILS # BLD: 7.5 K/UL (ref 1.7–7.7)
NEUTROPHILS NFR BLD: 76.6 %
PLATELET # BLD AUTO: 260 K/UL (ref 135–450)
PMV BLD AUTO: 7.4 FL (ref 5–10.5)
RBC # BLD AUTO: 4.22 M/UL (ref 4–5.2)
WBC # BLD AUTO: 9.8 K/UL (ref 4–11)

## 2024-11-10 PROCEDURE — 6370000000 HC RX 637 (ALT 250 FOR IP)

## 2024-11-10 PROCEDURE — 6360000002 HC RX W HCPCS

## 2024-11-10 PROCEDURE — 6370000000 HC RX 637 (ALT 250 FOR IP): Performed by: INTERNAL MEDICINE

## 2024-11-10 PROCEDURE — 99232 SBSQ HOSP IP/OBS MODERATE 35: CPT | Performed by: INTERNAL MEDICINE

## 2024-11-10 PROCEDURE — 36415 COLL VENOUS BLD VENIPUNCTURE: CPT

## 2024-11-10 PROCEDURE — 2580000003 HC RX 258

## 2024-11-10 PROCEDURE — 94640 AIRWAY INHALATION TREATMENT: CPT

## 2024-11-10 PROCEDURE — 94761 N-INVAS EAR/PLS OXIMETRY MLT: CPT

## 2024-11-10 PROCEDURE — 85025 COMPLETE CBC W/AUTO DIFF WBC: CPT

## 2024-11-10 RX ORDER — BUDESONIDE 0.5 MG/2ML
1 INHALANT ORAL
Qty: 60 EACH | Refills: 3 | Status: SHIPPED | OUTPATIENT
Start: 2024-11-10

## 2024-11-10 RX ORDER — LIDOCAINE 4 G/G
1 PATCH TOPICAL DAILY
Qty: 24 PATCH | Refills: 0 | Status: SHIPPED | OUTPATIENT
Start: 2024-11-11

## 2024-11-10 RX ORDER — GUAIFENESIN/DEXTROMETHORPHAN 100-10MG/5
5 SYRUP ORAL EVERY 4 HOURS PRN
Qty: 120 ML | Refills: 0 | Status: SHIPPED | OUTPATIENT
Start: 2024-11-10 | End: 2024-11-20

## 2024-11-10 RX ORDER — OXYCODONE HYDROCHLORIDE 5 MG/1
5-10 TABLET ORAL EVERY 4 HOURS PRN
Qty: 20 TABLET | Refills: 0 | Status: SHIPPED | OUTPATIENT
Start: 2024-11-10 | End: 2024-11-13

## 2024-11-10 RX ORDER — PREDNISONE 20 MG/1
20 TABLET ORAL 2 TIMES DAILY
Qty: 10 TABLET | Refills: 0 | Status: SHIPPED | OUTPATIENT
Start: 2024-11-10 | End: 2024-11-15

## 2024-11-10 RX ADMIN — SODIUM CHLORIDE, PRESERVATIVE FREE 10 ML: 5 INJECTION INTRAVENOUS at 09:09

## 2024-11-10 RX ADMIN — IPRATROPIUM BROMIDE AND ALBUTEROL SULFATE 1 DOSE: 2.5; .5 SOLUTION RESPIRATORY (INHALATION) at 12:19

## 2024-11-10 RX ADMIN — OXYCODONE 5 MG: 5 TABLET ORAL at 15:15

## 2024-11-10 RX ADMIN — PREDNISONE 20 MG: 20 TABLET ORAL at 09:03

## 2024-11-10 RX ADMIN — SODIUM CHLORIDE, PRESERVATIVE FREE 40 MG: 5 INJECTION INTRAVENOUS at 09:04

## 2024-11-10 RX ADMIN — IPRATROPIUM BROMIDE AND ALBUTEROL SULFATE 1 DOSE: 2.5; .5 SOLUTION RESPIRATORY (INHALATION) at 16:33

## 2024-11-10 RX ADMIN — BUDESONIDE INHALATION 1000 MCG: 0.5 SUSPENSION RESPIRATORY (INHALATION) at 08:40

## 2024-11-10 RX ADMIN — SODIUM CHLORIDE, PRESERVATIVE FREE 10 ML: 5 INJECTION INTRAVENOUS at 09:10

## 2024-11-10 RX ADMIN — IPRATROPIUM BROMIDE AND ALBUTEROL SULFATE 1 DOSE: 2.5; .5 SOLUTION RESPIRATORY (INHALATION) at 08:39

## 2024-11-10 RX ADMIN — TIOTROPIUM BROMIDE AND OLODATEROL 2 PUFF: 3.124; 2.736 SPRAY, METERED RESPIRATORY (INHALATION) at 08:40

## 2024-11-10 RX ADMIN — ALBUTEROL SULFATE 2.5 MG: 2.5 SOLUTION RESPIRATORY (INHALATION) at 00:26

## 2024-11-10 RX ADMIN — HYDROXYZINE HYDROCHLORIDE 25 MG: 25 TABLET ORAL at 09:03

## 2024-11-10 ASSESSMENT — PAIN DESCRIPTION - ORIENTATION
ORIENTATION: LEFT
ORIENTATION: LEFT;MID

## 2024-11-10 ASSESSMENT — PAIN SCALES - GENERAL
PAINLEVEL_OUTOF10: 6
PAINLEVEL_OUTOF10: 5

## 2024-11-10 ASSESSMENT — PAIN - FUNCTIONAL ASSESSMENT: PAIN_FUNCTIONAL_ASSESSMENT: ACTIVITIES ARE NOT PREVENTED

## 2024-11-10 ASSESSMENT — PAIN DESCRIPTION - FREQUENCY
FREQUENCY: INTERMITTENT
FREQUENCY: INTERMITTENT

## 2024-11-10 ASSESSMENT — PAIN DESCRIPTION - PAIN TYPE
TYPE: ACUTE PAIN
TYPE: ACUTE PAIN

## 2024-11-10 ASSESSMENT — PAIN DESCRIPTION - ONSET
ONSET: GRADUAL
ONSET: ON-GOING

## 2024-11-10 ASSESSMENT — PAIN DESCRIPTION - DESCRIPTORS
DESCRIPTORS: ACHING
DESCRIPTORS: ACHING

## 2024-11-10 ASSESSMENT — PAIN DESCRIPTION - LOCATION
LOCATION: CHEST
LOCATION: CHEST

## 2024-11-10 NOTE — PLAN OF CARE
Problem: Discharge Planning  Goal: Discharge to home or other facility with appropriate resources  11/9/2024 1956 by Sue Thurston RN  Outcome: Progressing  Flowsheets (Taken 11/9/2024 1956)  Discharge to home or other facility with appropriate resources:   Identify barriers to discharge with patient and caregiver   Arrange for needed discharge resources and transportation as appropriate   Identify discharge learning needs (meds, wound care, etc)  11/9/2024 0923 by Jarett Demarco RN  Outcome: Progressing     Problem: Pain  Goal: Verbalizes/displays adequate comfort level or baseline comfort level  Outcome: Progressing  Flowsheets (Taken 11/9/2024 1956)  Verbalizes/displays adequate comfort level or baseline comfort level:   Encourage patient to monitor pain and request assistance   Assess pain using appropriate pain scale   Administer analgesics based on type and severity of pain and evaluate response   Implement non-pharmacological measures as appropriate and evaluate response     Problem: Safety - Adult  Goal: Free from fall injury  11/9/2024 1956 by Sue Thurston RN  Outcome: Progressing  Flowsheets (Taken 11/9/2024 1956)  Free From Fall Injury: Instruct family/caregiver on patient safety  11/9/2024 0923 by Jarett Demarco RN  Outcome: Progressing     Problem: Skin/Tissue Integrity  Goal: Absence of new skin breakdown  Description: 1.  Monitor for areas of redness and/or skin breakdown  2.  Assess vascular access sites hourly  3.  Every 4-6 hours minimum:  Change oxygen saturation probe site  4.  Every 4-6 hours:  If on nasal continuous positive airway pressure, respiratory therapy assess nares and determine need for appliance change or resting period.  Outcome: Progressing     Problem: ABCDS Injury Assessment  Goal: Absence of physical injury  Outcome: Progressing  Flowsheets (Taken 11/9/2024 1956)  Absence of Physical Injury: Implement safety measures based on patient assessment

## 2024-11-10 NOTE — PLAN OF CARE
Problem: Pain  Goal: Verbalizes/displays adequate comfort level or baseline comfort level  Outcome: Progressing  Flowsheets (Taken 11/10/2024 1322)  Verbalizes/displays adequate comfort level or baseline comfort level: Encourage patient to monitor pain and request assistance  Note: Pt having pain 7/10 offered prn medication pt refused, heat packs given. Pt stated pain is improved with heat pack. Hourly rounding in place and pt encouraged to call if pain increases.      Problem: Safety - Adult  Goal: Free from fall injury  Outcome: Progressing  Flowsheets (Taken 11/10/2024 1322)  Free From Fall Injury: Instruct family/caregiver on patient safety  Note: Patient is a fall risk. See Fall Risk assessment for details. Bed is in low, lock position; call light/belongings within reach. No attempts to get out of bed have been made, calls appropriately when assistance is needed. Bed alarm and hourly rounds in place for safety. Pt is steady when ambulating and wears non-skid socks

## 2024-11-10 NOTE — DISCHARGE SUMMARY
according to patient size and/or use of iterative reconstruction technique. FINDINGS: Minimal oral contrast is seen within the stomach. No evidence of extraluminal oral contrast from the esophagus. There is a left-sided chest tube in place with a small left-sided pneumothorax. The mediastinal gas appears to have almost completely resolved. There is groundglass right upper lobe opacities as before. There is right middle lobe consolidation with right  mediastinal soft tissue fullness. Minimal lingular consolidation. Heart size borderline. Sludge or contrast within the gallbladder. Bones unremarkable.     1. No evidence of extraluminal oral contrast from the esophagus to suggest esophageal perforation 2. There is a small left-sided pneumothorax with a left-sided chest tube in place with interval near- resolution of pneumomediastinum 3. Multifocal consolidation as described. There is soft tissue fullness in the right hilum with right middle lobe consolidation/atelectasis which could represent mucous plugging or hilar mass. Electronically signed by Eliecer Davis    XR CHEST PORTABLE    Result Date: 11/7/2024  Portable chest x-ray single view. Indication: Left pneumothorax. Comparison study: Chest x-ray from 11/6/2024. Findings: Endotracheal tube with tip 2.6 cm above gisele. Left pleural catheter tip projecting over left upper lung zone. No visualized pneumothorax. Stable mild consolidation lower lung zones. No pleural effusion. Cardiac silhouette stable size.     Impression: 1. Mild atelectasis lung bases. 2. No visualized pneumothorax. Electronically signed by Silviano Varela MD    XR CHEST PORTABLE    Result Date: 11/6/2024  XR CHEST PORTABLE Indication: concern for pneumomediastinum COMPARISON: Correlation is made with outside hospital study dated 11/5/2024 Findings: AP semiupright view of the chest was obtained. Endotracheal tube terminates above the level of the gisele, distal to the thoracic inlet. Large bore

## 2024-11-10 NOTE — CARE COORDINATION
DISCHARGE PLANNING:  Chart reviewed.  Patient is from home with her spouse and no current services.    Current discharge plan is TBD pending medical course.  Therapy evaluations pending.    CM team will continue to follow.  Melva Munoz RN Case Manager  [phone]    
discharge:              Potential DME:    Patient expects to discharge to:    Plan for transportation at discharge:      Financial    Payor: CAROL / Plan: KEYANA MEDINA FEDERAL / Product Type: *No Product type* /     Does insurance require precert for SNF: Yes    Potential assistance Purchasing Medications:    Meds-to-Beds request:        [facility] [address] [place], [place] - [address] - P [phone] - F [phone]  [address] [facility] [address]  Phone: [phone] Fax: [phone]    [facility] [address] [place], [place] [address] [facility] -  [phone] - F [phone]  [address] [facility] [facility] [address]  Phone: [phone] Fax: [phone]      Notes:    Factors facilitating achievement of predicted outcomes: Family support    Barriers to discharge: Medical complications    Additional Case Management Notes: Chart reviewed. Patient is from home with her spouse and no current services.     Current discharge plan is TBD pending medical course. I asked Dr. Arguello to place therapy evaluation orders when appropriate.    The Plan for Transition of Care is related to the following treatment goals of Acute respiratory failure [J96.00]  Pneumothorax on left [J93.9]    IF APPLICABLE: The Patient and/or patient representative Shannon and her family were provided with a choice of provider and agrees with the discharge plan. Freedom of choice list with basic dialogue that supports the patient's individualized plan of care/goals and shares the quality data associated with the providers was provided to:     Patient Representative Name:       The Patient and/or Patient Representative Agree with the Discharge Plan?      MORE MORENO  Case Management Department  Ph: [phone] Fax:     
their way out of the hospital at discharge, or pharmacy can deliver to the bedside if staff is available. (payment due at time of pick-up or delivery - cash, check, or card accepted)     Able to afford home medications/ co-pay costs: Yes    ADLS:  Current PT AM-PAC Score: 18 /24  Current OT AM-PAC Score: 20 /24    DISCHARGE Disposition: Home- No Services Needed    LOC at discharge: Not Applicable  JOMAR Completed: No    Notification completed in HENS/PAS?:  Not Applicable    IMM Completed:   No         Transportation:  Transportation PLAN for discharge: family   Mode of Transport: Private Car    Home Oxygen and Respiratory Equipment:  Oxygen needed at discharge?: Yes  Home Oxygen Company: [employer] Phone: [phone]   Portable tank available for discharge?: Yes    Referrals made at DISCHARGE for outpatient continued care:  Not Applicable    Additional CM Notes:   Sw et w/pt and  at bedside  Patient is from home with her spouse and no current services.     Pt will dc home w/her spouse. Pt is in agreement with home O2. SW setup pt through [employer] they will come to her home to set up the o2. SW delivered POC     COVID Result:    Lab Results   Component Value Date/Time    COVID19 NOT DETECTED 11/05/2024 08:08 PM       The Plan for Transition of Care is related to the following treatment goals of Acute respiratory failure [J96.00]  Pneumothorax on left [J93.9]    The Patient and/or patient representative Shannon and her family were provided with a choice of provider and agrees with the discharge plan Yes    Freedom of choice list was provided with basic dialogue that supports the patient's individualized plan of care/goals and shares the quality data associated with the providers. Yes    Care Transitions patient: No    Jesusita Corrales MSW, LSW  [facility]  Case Management Department  Ph: [phone]

## 2024-11-10 NOTE — PROGRESS NOTES
Pulmonary Followup Note    CC: severe asthma exacerbation, pneumomediastinum with pneumothorax  Subjective:  Saturating well on room air, and in no acute distress.  Left-sided chest tube has been removed.    ROS:  Denies headache, nausea or chest pain.    24HR INTAKE/OUTPUT:    Intake/Output Summary (Last 24 hours) at 2024 1653  Last data filed at 2024 1230  Gross per 24 hour   Intake 1198.98 ml   Output 2430 ml   Net -1231.02 ml        predniSONE  20 mg Oral BID    tiotropium-olodaterol  2 puff Inhalation Daily    sodium chloride flush  5-40 mL IntraVENous 2 times per day    enoxaparin  40 mg SubCUTAneous QPM    lidocaine  1 patch TransDERmal Daily    sodium chloride flush  5-40 mL IntraVENous 2 times per day    pantoprazole (PROTONIX) 40 mg in sodium chloride (PF) 0.9 % 10 mL injection  40 mg IntraVENous Daily    levofloxacin  750 mg IntraVENous Q24H    [Held by provider] calcium gluconate  2,000 mg IntraVENous Once    vancomycin HCl in Dextrose  1,250 mg IntraVENous Q12H    budesonide  1 mg Nebulization BID RT           PHYSICAL EXAMINATION:  BP (!) 160/85   Pulse 75   Temp 97.5 °F (36.4 °C) (Oral)   Resp 14   Ht 1.651 m (5' 5\")   Wt 72 kg (158 lb 11.7 oz)   LMP 2016 Comment: prior to this one, it was 2 years ago  SpO2 93%   BMI 26.41 kg/m²   CURRENT PULSE OXIMETRY:  SpO2: 93 %  24HR PULSE OXIMETRY RANGE:  SpO2  Av.3 %  Min: 90 %  Max: 98 %   O2 Flow Rate (L/min): 1 L/min      Gen: No distress. Speaking in full sentences with out accessory muscle use  HEENT: PERRL, EOMI, OP nl  Lung: Scattered inspiratory squeaks and faint end expiratory wheezing heard best at the lung bases.  No stridor  CV: RRR without M/R/R  Abd: +BS, soft, NT/ND  Ext: No edema.    DATA  CBC:   Recent Labs     24  0836 24  1106 24  0604   WBC 13.9* 14.1* 17.0*   HGB 13.8 12.3 12.8   HCT 41.6 37.4 38.8   MCV 90.4 90.8 91.2    225 249     BMP:   Recent 
                                       Pulmonary Followup Note    CC: severe asthma exacerbation, pneumomediastinum with pneumothorax  Subjective:  Saturating well on room air, was feeling much better this morning, and asking to go home.  However, she then had a coughing fit and is now having pleuritic left sided chest discomfort.  She refused the Stiolto that I ordered saying she thought we were going to try it as an outpatient.    ROS:  Denies headache, nausea or chest pain.    24HR INTAKE/OUTPUT:    Intake/Output Summary (Last 24 hours) at 2024 1428  Last data filed at 2024 1300  Gross per 24 hour   Intake 1520 ml   Output 0 ml   Net 1520 ml        predniSONE  20 mg Oral BID    tiotropium-olodaterol  2 puff Inhalation Daily    sodium chloride flush  5-40 mL IntraVENous 2 times per day    enoxaparin  40 mg SubCUTAneous QPM    lidocaine  1 patch TransDERmal Daily    sodium chloride flush  5-40 mL IntraVENous 2 times per day    pantoprazole (PROTONIX) 40 mg in sodium chloride (PF) 0.9 % 10 mL injection  40 mg IntraVENous Daily    levofloxacin  750 mg IntraVENous Q24H    [Held by provider] calcium gluconate  2,000 mg IntraVENous Once    budesonide  1 mg Nebulization BID RT           PHYSICAL EXAMINATION:  /77   Pulse 75   Temp 98.2 °F (36.8 °C) (Oral)   Resp 18   Ht 1.651 m (5' 5\")   Wt 73.7 kg (162 lb 7.7 oz)   LMP 2016 Comment: prior to this one, it was 2 years ago  SpO2 93%   BMI 27.04 kg/m²   CURRENT PULSE OXIMETRY:  SpO2: 93 %  24HR PULSE OXIMETRY RANGE:  SpO2  Av.4 %  Min: 91 %  Max: 96 %   O2 Flow Rate (L/min): 2 L/min      Gen: No distress. Speaking in full sentences with out accessory muscle use  HEENT: PERRL, EOMI, OP nl  Lung: faint Ronchi on the left, no wheezing.  No stridor  CV: RRR without M/R/R  Abd: +BS, soft, NT/ND  Ext: No edema.    DATA  CBC:   Recent Labs     24  1106 24  0604 24  0727   WBC 14.1* 17.0* 12.6*   HGB 12.3 12.8 12.1   HCT 37.4 
     ICU Progress Note    Admit Date: 11/6/2024  Day: 2  Vent Day: 2  IV Access:Peripheral  IV Fluids:None  Vasopressors:None                Antibiotics: levaquin, fluconazole  Diet: Diet NPO    CC: SOB    Interval history:   Sedation weaned to precedex and fentanyl.  Patient complaining of \"gurgling\" around her ET tube that was relieved with suctioning.  Pt and family agreed to low dose steroid treatment 11/6.  Adequate response since initiation will plan for extubation this am.    HPI:   Ms. Zeng is a 59 y.o. F with pmhx of moderate persistent asthma, vocal cord dysfunction, and esophageal webs requiring dilation who presents as a transfer from [facility] with shortness of breath.     Patient began having progressively worsening shortness of breath for the past four days that was not relieved with her home inhalers.  On 11/5 she felt as though she was unable to breath for which she called EMS.  Upon arrival patient was found to be hypoxic to 60% on RA and taken to ED.  She has a reported allergy to steroids and hx of refusing treatment with steroids.     She was placed on HFNC with improvement in saturations to 80%.  Imaging as noted below.  Pt further decompensated unable to lay flat for which decision was made to proceed with intubation.  Repeat imaging revealed progression of pneumothorax.  Left sided chest tube placed in ED and patient was transferred to [facility] for thoracic surgery evaluation.     Per chart review she contacted her pulmonologist on 11/5 with symptoms of shortness of breath with productive dark yellow sputum.        ED Course:  On arrival to the ED, patient was afebrile with BP of 137/75 , saturating 74% on supplementary oxygen.   Labs were significant for:  WBC 15.0  D-dimer 0.71  VBG: pH 7.339, pCO2 42.9, bicarb 22.6  Influenza, COVID negative  Imaging:  CXR:  Small left sided pneumothorax  Bibasilar airspace consolidation in possible small right effusion. The left sided pleural 
    Progress Note  Admit Date: 11/6/2024             CC: F/U for dyspnea, hypoxia    59 y.o. F with pmhx of moderate persistent asthma, vocal cord dysfunction, and esophageal webs requiring dilation who presents as a transfer from [facility] with shortness of breath.     Patient began having progressively worsening shortness of breath for the past four days that was not relieved with her home inhalers.  On 11/5 she felt as though she was unable to breath for which she called EMS.  Upon arrival patient was found to be hypoxic to 60% on RA and taken to ED.  She has a reported allergy to steroids and hx of refusing treatment with steroids.     She was placed on HFNC with improvement in saturations to 80%.  Imaging as noted below.  Pt further decompensated unable to lay flat for which decision was made to proceed with intubation.  Repeat imaging revealed progression of pneumothorax.  Left sided chest tube placed in ED and patient was transferred to [facility] for thoracic surgery evaluation.     Per chart review she contacted her pulmonologist on 11/5 with symptoms of shortness of breath with productive dark yellow sputum.        ED Course:  On arrival to the ED, patient was afebrile with BP of 137/75 , saturating 74% on supplementary oxygen.   Labs were significant for:  WBC 15.0  D-dimer 0.71  VBG: pH 7.339, pCO2 42.9, bicarb 22.6  Influenza, COVID negative  Imaging:  CXR:  Small left sided pneumothorax  Bibasilar airspace consolidation in possible small right effusion. The left sided pleural effusion not well demonstrated  Pneumomediastinum  Prominent patchy interstitial change may represent pneumonitis or edema  CT PE  No pulmonary emboli  Small left sided hydropneumothorax  Pneumomediastinum  Patchy ground glass infiltrates in the lungs bilaterally with lower lobar collapse bilaterally. Findings suspicious for multilobar pneumonia   While in the ED, she received   Levofloxacin (patient has allergic reactions to 
    V2.0    [facility] [place]      Name:  Shannon Zeng /Age/Sex: 1965  (59 y.o. female)   MRN & CSN:  [id number] & [id number] Encounter Date/Time: 2024 12:57 PM EST   Location:  [facility]- PCP: No primary care provider on file.     Attending:Sid Wallis MD       Hospital Day: 4    Assessment and Recommendations       Assessment/Plan:     Multi lobar pneumonia with sepsis at time of admission:  Discussed with Dr. Jeffers  Complete course of Levaquin    Pneumomediastinum and pneumothorax:  CT has ruled out esophageal perforation:  Chest tube removed from left side on   Patient is complaining of some pain and swelling    Asthma exacerbation:  Discussed with Dr. Jeffers  Reviewed note from pulmonology  Adjust medications as insurance allows  Follow-up outpatient pulmonologist      MDM       [x] High (any 2)     A. Problems (any 1)  [x] Acute/Chronic Illness/injury posing threat to life or bodily function:    [] Severe exacerbation of chronic illness:    ---------------------------------------------------------------------  B. Risk of Treatment (any 1)   [] Drugs/treatments that require intensive monitoring for toxicity include:    [] IV ABX requiring serial renal monitoring for nephrotoxicity:     [] IV Narcotic analgesia for adverse drug reaction  [] IV diuresis requiring serial monitoring for renal impairment and electrolyte derangements  [] Critical electrolyte abnormalities requiring IV replacement and close serial monitoring  [] Insulin - monitoring serial FSBS for Hypoglycemic adverse drug reaction  [] Anticoagulation requiring serial monitoring of coagulation factors  [] Other -   [] Change in code status:    [] Decision to escalate care:    [] Major surgery/procedure with associated risk factors:    ----------------------------------------------------------------------  C. Data (any 2)  [] Discussed current management and discharge planning options with Case Management.  [x] 
 [facility]    Respiratory Therapy     Home Oxygen Evaluation        Name: Shannon Zeng  Medical Record Number: [id number]  Age: 59 y.o.  Gender:  female   : 1965  Today's date: 11/10/2024  Room: [address] [place] [address]      Assessment        /78   Pulse 80   Temp 98.3 °F (36.8 °C) (Oral)   Resp 16   Ht 1.651 m (5' 5\")   Wt 72.8 kg (160 lb 7.9 oz)   LMP 2016 Comment: prior to this one, it was 2 years ago  SpO2 91%   BMI 26.71 kg/m²     Patient Active Problem List   Diagnosis    Former smoker    RAD (reactive airway disease)    Chronic cough    Moderate persistent asthma without complication    Vocal cord dysfunction    Aspiration of foreign body    Bronchiolitis    Multifocal pneumonia    Hypoxia    SOB (shortness of breath)    Laryngospasms    Acute respiratory failure with hypoxia    Acute aspiration pneumonia (HCC)    Acute respiratory failure    Pneumothorax on left    Pneumomediastinum (HCC)    Severe persistent asthma with acute exacerbation       Social History:  Social History     Tobacco Use    Smoking status: Former     Current packs/day: 0.00     Average packs/day: 0.5 packs/day for 11.0 years (5.5 ttl pk-yrs)     Types: Cigarettes     Start date: 1989     Quit date: 2000     Years since quittin.8    Smokeless tobacco: Never   Vaping Use    Vaping status: Never Used   Substance Use Topics    Alcohol use: Yes     Alcohol/week: 4.0 standard drinks of alcohol     Types: 4 Cans of beer per week    Drug use: No       Patient Room Air saturation at rest 91  %  Patient Room Air saturation upon ambulation 88 %    Oxygen saturations of 88% or less on RA qualifies patient for Home Oxygen    Patient resting on 0  lmp  with an oxygen saturation of  91 %     Patient ambulated on 2 lpm with an oxygen saturation of 92%      Qualifying patient for home oxygen with ambulation and continuous flow  @ 2 lpm.      In your clinical assessment does the Patient Require Portable Oxygen 
4 Eyes Skin Assessment     NAME:  Shannon Zeng  YOB: 1965  MEDICAL RECORD NUMBER:  [id number]    The patient is being assessed for  Admission    I agree that at least one RN has performed a thorough Head to Toe Skin Assessment on the patient. ALL assessment sites listed below have been assessed.      Areas assessed by both nurses:    Head, Face, Ears, Shoulders, Back, Chest, Arms, Elbows, Hands, Sacrum. Buttock, Coccyx, Ischium, Legs. Feet and Heels, and Under Medical Devices         Does the Patient have a Wound? No noted wound(s)       Jose Prevention initiated by RN: No  Wound Care Orders initiated by RN: No    Pressure Injury (Stage 3,4, Unstageable, DTI, NWPT, and Complex wounds) if present, place Wound referral order by RN under : No    New Ostomies, if present place, Ostomy referral order under : No     Nurse 1 eSignature: Electronically signed by Gilma Jolley RN on 11/6/24 at 8:19 AM EST    **SHARE this note so that the co-signing nurse can place an eSignature**    Nurse 2 eSignature: Electronically signed by Ruth Aguero RN on 11/6/24 at 8:20 AM EST    
Discharge order received. Patient informed of discharge order. Discharge instructions reviewed with patient and family. Copy of discharge instructions given to patient. Signed prescriptions sent electronically to pt's preferred pharmacy. Patient verbalized understanding, denies needs or questions at this time. IV and telemetry removed. All patient belongings packed and sent with patient upon discharge. Patient left and family drove her home  
Dressing to left chest where chest tube was removed remains clean dry and intact.   
General Internal Medicine Attending    Chart and data reviewed.    Labs and imaging studies reviewed.     Admitted earlier today, 11/6/24         59 y.o. female with moderate persistent asthma and vocal cord dysfunction, history of esophageal webs, for which she had an esophageal dilation who presented from home to the [facility] ED on 11/6/24 with shortness of breath.     Had called her pulmonologist on 11/5/24 at 5 PM stating she had woken up the previous day with shortness of breath and cough with dark yellow sputum     EMS gave additional albuterol treatments pxt sat in the 60s on room air. In ED, sat could not go above 80% despite high flow nasal canula     Of note, patient has a \"steroid allergy\" and could not receive steroid      Imaging: small left sided pneumothorax as well as a pneumomediastinum and bilateral lung consolidations    Was intubated in the ED    Repeat imaging after intubation demonstrated progression of the pneumothorax and a surgical chest tube was placed, transferred to [facility] per the recommendations of thoracic surgery           Acute hypoxic respiratory failure likely multifactorial sec to aspiration and multifocal pneumonia due to aspiration, pneumomediastinum and Pneumothorax    Pneumomediastinum and Pneumothorax  - Felt by CTS less likely sec to esophageal injury, and more likely sec to obstructive lung disease/asthma.     Sepsis sec to Multilobar pneumonia     Asthma exacerbation/VCD         - Acute resp failure possibly sec to asthma exacerbation, VCD, as well as aspiration    - Per chart review, appears Hx of multiple intolerance to standard of care treatments for asthma, including steroids., bronchodilators likely leading to poorly controlled asthma, and asthma exacerbation, aggravated by VSD, and ultimateley leading to pneumothorax and peumomediastinum with picture aggravated by hx of esophageal web requiring esophageal dilation in the past.    - Intubated in the ED 
Occupational / Physical Therapy  Attempt   Attempt to see pt this AM for therapy evals. Pt anxious, tearful and having pain. Attempting to make pt more comfortable. Unable to cont with eval. Will follow up at later date and time as pt is appropriate.     10 min spent with pt     Karen Song, MOT, OTR/L, CNS  Chasity Guo, PT      
Occupational Therapy  Facility/Department: [address]  Occupational Therapy Initial Assessment/ Treatment    Name: Shannon Zeng  : 1965  MRN: [id number]  Date of Service: 2024    Discharge Recommendations:  Home with Home health OT, 24 hour supervision or assist  OT Equipment Recommendations  Equipment Needed: No  Other: defer    Patient Diagnosis(es): Acute respiratory failure   Past Medical History:  has a past medical history of Asthma, Migraines, and Vocal cord dysfunction.  Past Surgical History:  has a past surgical history that includes Cervical discectomy; cervical fusion (3/29/10); Tubal ligation; and sinus surgery.    Treatment Diagnosis: decreased fxl mobility and ADLs    Assessment  Performance deficits / Impairments: Decreased functional mobility ;Decreased endurance  Assessment: Pt is a 60 y/o female admitted for acute respiratory failure. Pt independent at baseline w fxl mobility and ADLs. Pt requiring MinAx2 progressing to CGA for transfers and fxl mobility. Pt completing grooming and footwear mgmt. Pt reports feeling much weaker than prior to admission and would benefit from HHOT and 24hrA (which she has from ) at d/c to increase strength and independence. Will cont to follow on acute OT POC.  Treatment Diagnosis: decreased fxl mobility and ADLs  Prognosis: Good  Decision Making: Low Complexity  REQUIRES OT FOLLOW-UP: Yes  Activity Tolerance  Activity Tolerance: Patient Tolerated treatment well     Plan  Occupational Therapy Plan  Times Per Week: 2-5x  Current Treatment Recommendations: Strengthening, Self-Care / ADL, Balance training, ROM, Functional mobility training, Endurance training, Patient/Caregiver education & training, Safety education & training    Restrictions  Position Activity Restriction  Other position/activity restrictions: up with assist, droplet isolation    Subjective  General  Chart Reviewed: Yes  Patient assessed for rehabilitation services?: 
Patient was evaluated today for the diagnosis of  Persisitent severe asthma, pneuonia .  I entered a DME order for home oxygen at 2 lpm because the diagnosis and testing require the patient to have supplemental oxygen.  Condition will improve or be benefited by oxygen use.  The patient is not able to perform good mobility in a home setting and therefore does require the use of a portable oxygen system.  The need for this equipment was discussed with the patient and she understands and is in agreement.    
Point of care note:    S/p chest tube removal, post pull CXR revealed no new or progressive pneumothorax.  Plan: advance diet as tolerated per medicine. Thoracic surgery will sign off. Please call with any questions or concerns  
Pt admitted to 4506 from [place] ED. Pt is intubated and on sedation at time of admission. Pt not following commands. Sedation turned off due to hypotension. L chest tube in place hooked up to suction and dressing changed. Skin assessment and CHG bath given.   
Pt extubated at 0950. Tolerating well, currently on 2L NC.   
Pt remains intubated/sedated, following commands and writing wants/questions on paper. Expiratory wheezes heard throughout. Chest tube in place -20 suction, no drainage. Gibson in place, will remove today.   
Pt tolerating CPAP trial. ABG completed. ICU notified. Will titrate down on prop and substitute with precedex.   
Pt wide awake on ventilator. Pt is able to follow commands and write with pen and paper. Pt is wheezing and complaining of left chest pain and points to area where chest tube is located. Sedation, pain medication, and levophed restarted. RT and ICU team at bedside. CT of abd/pelivc/chest ordered however, after speaking with team plan to delay due to Pts discomfort while being on travel ventilator. Plan to sedate patient comfortably before. Pt nods appropriately.   
Pts family would like her pulmonologist contacted regarding steroid use, informed Dr Jeffers of this. Notified RT to not administer steroids until further clarification.   
Pulse oximetry assessment   88% at rest on room air (if 88% or less, skip next steps)  88% while ambulating on room air  92% at rest on 2LPM  90% while ambulating on 2LPM      
Report called to oncoming RN. All questions answered. Patient wheeled to room 4317 with all belongings. Transfer of care complete.  
Rn changed dressing to left chest where chest tube was placed per dr Galina Jeffers to see if area was sealed with glue or stitches, no stitches noted.  
Rn taught pt how to use oxygen concentrator   
[facility] -  Clinical Pharmacy Note    Vancomycin - Management by Pharmacy    Consult Date(s): 11/6/24  Consulting Provider(s): Masood Rivera,      Assessment / Plan  CAP + Pneumomediastinum - Vancomycin  Concurrent Antimicrobials: levofloxacin  Day of Vanc Therapy / Ordered Duration: 4 of 7  Current Dosing Method: Bayesian-Guided AUC Dosing  Therapeutic Goal: -600 mg/L*hr  Current Dose / Plan:   Renal function stable at baseline, SCr 0.6 today.  UOP 0.3 mL/kg/hr in the past 24h but unmeasured UOP x4 s/p catheter removal.    Currently on 1250 mg IV q12h  Calculated AUC = 514 mg/L*hr with trough = 11 mg/L  Will continue same regimen for now.    Plan to repeat level in ~48h unless otherwise clinically indicated  MRSA nares negative, will monitor for discontinuation  Will continue to monitor clinical condition and make adjustments to regimen as appropriate.    Please call with any questions,  Mary PérezD  PGY1 Pharmacy Resident  Wireless: [phone]  11/9/2024 10:11 AM          Interval update:  transferred to PCU; catheter removed; MRSA nares negative - will mon for therapy de-escalation; WBC 17.0 -> 12.6; now requiring 2L NC as of this AM (previously on RA x1 day)    Subjective/Objective:   Shannon Zeng is a 59 y.o. female with a PMHx significant for moderate persistent asthma, vocal cord dysfunction, and esophageal webs requiring dilation who presents as a transfer from [facility] with shortness of breath, found to have pneumothorax now s/p chest tube placement.    Ht Readings from Last 1 Encounters:   11/07/24 1.651 m (5' 5\")     Wt Readings from Last 1 Encounters:   11/09/24 73.7 kg (162 lb 7.7 oz)     Current & Prior Antimicrobial Regimen(s):  Fluconazole (11/6 - 11/8)  Levofloxacin (11/5 - 11/10)  Vancomycin  1750 mg x1 (11/6)  1250 mg q12h (11/6 - current)    Vancomycin Level(s) / Doses:    Date Time Dose Type of Level / Level Interpretation   11/8 0600 1250mg IV q12h 
[facility] -  Clinical Pharmacy Note    Vancomycin - Management by Pharmacy    Consult Date(s): 11/6/24  Consulting Provider(s): Masood Rivera, DO     Assessment / Plan  CAP + Pneumomediastinum - Vancomycin  Concurrent Antimicrobials: levofloxacin, fluconazole  Day of Vanc Therapy / Ordered Duration: 2 of 7  Current Dosing Method: Bayesian-Guided AUC Dosing  Therapeutic Goal: -600 mg/L*hr  Current Dose / Plan:   Renal function had been at baseline, SCr 0.7 --> 0.6 today.  UOP adequate at 0.8 mL/kg/hr in the past 24h.    Currently on 1250 mg q12h  Predicted AUCss ~586 mg/L*hr, Tr ~13.2 mg/L.    Will continue same regimen for now.    Plan to obtain a level 11/8 AM to assess kinetics.  Will continue to monitor clinical condition and make adjustments to regimen as appropriate.    Thank you for consulting pharmacy,  Hali HernandezD., BCPS   11/7/2024 2:19 PM  Wireless: 2-1600        Interval update:  Patient extubated this morning.  Pressors off.  Rhinovirus detected in PNA panel.  Patient agreeable and now receiving methylprednisolone and inhaled budesonide.      Subjective/Objective:   Shannon Zeng is a 59 y.o. female with a PMHx significant for moderate persistent asthma, vocal cord dysfunction, and esophageal webs requiring dilation who presents as a transfer from [facility] with shortness of breath, found to have pneumothorax now s/p chest tube placement.    Ht Readings from Last 1 Encounters:   11/05/24 1.651 m (5' 5\")     Wt Readings from Last 1 Encounters:   11/07/24 72 kg (158 lb 11.7 oz)     Current & Prior Antimicrobial Regimen(s):  Fluconazole (11/6 - current)  Levofloxacin (11/5 - current)  Vancomycin  1750 mg x1 (11/6)  1250 mg q12h (11/6 - current)    Vancomycin Level(s) / Doses:    Date Time Dose Type of Level / Level Interpretation   11/8 0600 1250mg IV q12h Random - ordered           Note: Serum levels collected for AUC-based dosing may be high if collected in 
[facility] [facility] -  Clinical Pharmacy Note    Vancomycin - Management by Pharmacy    Consult Date(s): 11/6/24  Consulting Provider(s): Masood Rivera,      Assessment / Plan  CAP + Pneumomediastinum - Vancomycin  Concurrent Antimicrobials: levofloxacin  Day of Vanc Therapy / Ordered Duration: 3 of 7  Current Dosing Method: Bayesian-Guided AUC Dosing  Therapeutic Goal: -600 mg/L*hr  Current Dose / Plan:   Renal function stable at baseline, SCr 0.6 today.  UOP robust at 1.6 mL/kg/hr in the past 24h.    Currently on 1250 mg q12h  Level today = 15.5 mg/L, drawn ~7 hr after 4th dose.    Calculated AUC = 513 mg/L*hr with trough = 11 mg/L, which is within goal range.    Will continue same regimen for now.    Plan to repeat level in ~3 days, or sooner if clinically indicated.  MRSA nares in process.    Will continue to monitor clinical condition and make adjustments to regimen as appropriate.    Thank you for consulting pharmacy,  Hali Novak PharmD., [facility]   11/8/2024 10:57 AM  Wireless: [phone]        Interval update:  Patient on room air this AM.  Per Thoracic Surg, no intervention needed, and perforated ruled out given CT with oral contrast shows no extravasation.  Continues on vancomycin and Levofloxacin for CAP.       Subjective/Objective:   Shannon Zeng is a 59 y.o. female with a PMHx significant for moderate persistent asthma, vocal cord dysfunction, and esophageal webs requiring dilation who presents as a transfer from [facility] with shortness of breath, found to have pneumothorax now s/p chest tube placement.    Ht Readings from Last 1 Encounters:   11/07/24 1.651 m (5' 5\")     Wt Readings from Last 1 Encounters:   11/07/24 72 kg (158 lb 11.7 oz)     Current & Prior Antimicrobial Regimen(s):  Fluconazole (11/6 - 11/8)  Levofloxacin (11/5 - current)  Vancomycin  1750 mg x1 (11/6)  1250 mg q12h (11/6 - current)    Vancomycin Level(s) / Doses:    Date Time Dose Type of Level / Level 
Thoracic Surgery   Daily Progress Note  Patient: Shannon Zeng      CC: SOB, L pneumothorax, concern for esophageal perforation     SUBJECTIVE:   NAEON. Patient is awake and intubated. She is able to communicate via writing on paper. She reports no chest pain this AM, no pain.     ROS:   A 14 point review of systems was conducted, significant findings as noted above. All other systems negative.    OBJECTIVE:    PHYSICAL EXAM:    Vitals:    11/07/24 0630 11/07/24 0640 11/07/24 0645 11/07/24 0700   BP: 113/89 (!) 80/67 (!) 80/67 95/80   Pulse: 82 98 90 93   Resp: 20 18 15 14   Temp:       TempSrc:       SpO2:   92% 100%   Weight:           General appearance: Intubated  Eyes: No scleral icterus, EOM grossly intact  Neck: trachea midline, no JVD  Chest/Lungs: Normal effort on vent.Vent Mode: CPAP/PS, Vt (Set, mL): 450 mL, Resp Rate (Set): 14 bpm, FiO2 : 40 %, PEEP/CPAP (cmH2O): 5,  , Peak Inspiratory Pressure (cmH2O): 13 cmH2O CT to -20 suction. No air leak, minimal SS output.   Cardiovascular: perfused.   Skin: warm and dry, no rashes  Extremities: no edema, no cyanosis  Genitourinary: Gibson in place with clear yellow urine  Neuro: A&Ox3, no focal deficits, sensation intact    ASSESSMENT & PLAN:   Shannon Zeng is a 59 y.o. female with Hx of moderate persistent asthma, steroid allergy (SOB), penicillin allergy (anaphylaxis), vocal cord dysfunction, and esophageal webs requiring dilation (2016) who presents as a transfer from [facility] with shortness of breath, pneumomediastinum, left spontaneous pneumothorax with chest tube placement on -20 of suction. Thoracic surgery consulted for management of CT and due to concern of esophageal perforation.     - No acute thoracic surgical intervention indicated at this time  - Extubation per ICU team  - Will keep CT to -20 suction  - Esophageal perforation is of low suspicion at this time as patient has no vomiting history. Will obtain swallow study after 
Thoracic Surgery   Daily Progress Note  Patient: Shannon Zeng      CC: SOB, L pneumothorax, concern for esophageal perforation     SUBJECTIVE:   Patient reports chest pain overnight, not associated with deep inspiration. No SOB. It has since resolved. She reports no chest pain this AM and is using IS to 1500. No chest pain on inspiration.     ROS:   A 14 point review of systems was conducted, significant findings as noted above. All other systems negative.    OBJECTIVE:    PHYSICAL EXAM:    Vitals:    11/08/24 0600 11/08/24 0700 11/08/24 0800 11/08/24 0814   BP: 132/68 (!) 127/55 (!) 143/87    Pulse: 97 81 91 (!) 109   Resp: 15 18 28 23   Temp:   97.6 °F (36.4 °C)    TempSrc:   Oral    SpO2: 98% 96% 96% 95%   Weight:       Height:           General appearance: alert and oriented.   Eyes: No scleral icterus, EOM grossly intact  Neck: trachea midline, no JVD  Chest/Lungs: Normal effort, no use of accessory. CT in place, tidaling with small air leak on water seal.   Cardiovascular: well perfused.   Skin: warm and dry, no rashes  Extremities: no edema, no cyanosis  Genitourinary: Purewick in place with clear yellow urine  Neuro: A&Ox3, no focal deficits, sensation intact    ASSESSMENT & PLAN:   Shannon Zeng is a 59 y.o. female with Hx of moderate persistent asthma, steroid allergy (SOB), penicillin allergy (anaphylaxis), vocal cord dysfunction, and esophageal webs requiring dilation (2016) who presents as a transfer from [facility] with shortness of breath, pneumomediastinum, left spontaneous pneumothorax with chest tube. Thoracic surgery consulted for management of CT and due to concern of esophageal perforation.     - No acute thoracic surgical intervention indicated at this time  - Patient was extubated yesterday and chest tube placed to water seal with small air leak.   - Will d/c chest tube today, and obtain 4hr CXR.   - Esophageal perforation ruled out, as CT with oral contrast showed no 
VSS on room air with intermittent O2 needs via NC for comfort. AOx4. No acute events this shift. Pt is up assist x1 CGA, denies dizziness. Voiding via BRP, denies pain or difficulty when urinating. Tolerating PO diet/fluids, denies N/V. All fall precautions in place.    
   140   K 4.2 4.1    106   CO2 24 24   PHOS 2.6 3.3   BUN 10 12   CREATININE 0.6 0.6     No results for input(s): \"PHART\", \"NJZ7TQN\", \"PO2ART\" in the last 72 hours.    LIVER PROFILE:   No results for input(s): \"AST\", \"ALT\", \"LIPASE\", \"AMYLASE\", \"BILIDIR\", \"BILITOT\", \"ALKPHOS\" in the last 72 hours.    Invalid input(s): \"ALB\"    Procalcitonin:    Lab Results   Component Value Date/Time    PROCAL 0.50 11/06/2024 08:36 AM       CXR REVIEWED BY ME AND SHOWED:  XR CHEST PORTABLE   Final Result   Status post left thoracostomy tube removal with no new or   progressive pneumothorax.      Electronically signed by Kyle Talamantes      XR CHEST PORTABLE   Final Result      Small left pneumothorax which may be stable or decreased recent prior CT.      Electronically signed by Femi Ram      CT CHEST WO CONTRAST   Final Result      1. No evidence of extraluminal oral contrast from the esophagus to suggest esophageal perforation   2. There is a small left-sided pneumothorax with a left-sided chest tube in place with interval near- resolution of pneumomediastinum   3. Multifocal consolidation as described. There is soft tissue fullness in the right hilum with right middle lobe consolidation/atelectasis which could represent mucous plugging or hilar mass.      Electronically signed by Eliecer Davis      XR CHEST PORTABLE   Final Result   Impression:       1. Mild atelectasis lung bases.   2. No visualized pneumothorax.      Electronically signed by Silviano Varela MD      XR CHEST PORTABLE   Final Result   No significant change.      Electronically signed by Kyle Talamantes           ASSESSMENT/PLAN:  This is a 59 y.o. female with severe persistent asthma with acute exacerbation, pneumomediastinum and pneumothorax, 2/2 rhinovirus.    Needs 2L of oxygen with exertion  Still with pain with coughing, which occurs with deep breathing.  I recommended that she use the cough suppressant.    Continue nebulized budesonide and 
Pt would benefit from RW.  Stairs/Curb  Stairs?: No     Balance  Comments: CGA-SBA to maintain dynamic standing balance at sink for oral care          OutComes Score                                                  AM-PAC - Mobility    AM-PAC Basic Mobility - Inpatient   How much help is needed turning from your back to your side while in a flat bed without using bedrails?: A Little  How much help is needed moving from lying on your back to sitting on the side of a flat bed without using bedrails?: A Little  How much help is needed moving to and from a bed to a chair?: A Little  How much help is needed standing up from a chair using your arms?: A Little  How much help is needed walking in hospital room?: A Little  How much help is needed climbing 3-5 steps with a railing?: A Little  AM-PAC Inpatient Mobility Raw Score : 18  AM-PAC Inpatient T-Scale Score : 43.63  Mobility Inpatient CMS 0-100% Score: 46.58  Mobility Inpatient CMS G-Code Modifier : CK         Tinneti Score       Goals  Short Term Goals  Time Frame for Short Term Goals: Discharge  Short Term Goal 1: Pt will perform all bed mobility with supervision  Short Term Goal 2: Pt will perform sit to/from stand with RW and supervision  Short Term Goal 3: Pt will ambulate 150' with RW and supervision  Short Term Goal 4: Pt will ascend/descend 1 curb with RW and SBA  Short Term Goal 5: Pt will ascend/descend 12 stairs with unilateral HR and CGA  Patient Goals   Patient Goals : \"To be back to where I was on Saturday.\"       Education  Patient Education  Education Given To: Patient;Family  Education Provided: Role of Therapy;Plan of Care  Education Method: Verbal  Barriers to Learning: None  Education Outcome: Verbalized understanding      Therapy Time   Individual Concurrent Group Co-treatment   Time In 1350         Time Out 1429         Minutes 39             Timed Code Treatment Minutes:   24    Total Treatment Minutes:  39      Chasity Guo, PT     This note 
were informed of the results of any tests, a time was given to answer questions, a plan was proposed and they agreed with plan.    Full Code            Disposition: Transfer from the ICU  PT/OT eval: 24 hour supervision or assist, Home with Home health PT   Pxt is from Home  Possible discharge in 1 to 2 days, possibly tomorrow          Janeth Arguello MD

## 2024-11-11 ENCOUNTER — TELEPHONE (OUTPATIENT)
Dept: PULMONOLOGY | Age: 59
End: 2024-11-11

## 2024-11-11 NOTE — TELEPHONE ENCOUNTER
Patient said that Dr. Jeffers has been talking to you about the patient.  She said she was put on Albuterol neb Solution and thought it was Duoneb.  She was also put on Stiolto and Pulmicort inhalers.  Patient wants to know [facility] will Dr. Jeffers.

## 2024-11-11 NOTE — TELEPHONE ENCOUNTER
Patient left VM and wanted to update on hospital visit and talk about an inhaler.    PH: [phone]

## 2024-11-12 NOTE — TELEPHONE ENCOUNTER
She should continue her rescue inhaler that she was taking before the admission.  I started her on pulmicort nebulizer treatments and Stiolto in the hospital and wanted both to continue provided she could get insurance to cover them.  She has a lot of reported allergies so we know she can tolerate these two medications for maintenance.  I also recommended a prednisone taper which should have been detailed in her discharge summary.

## 2024-11-14 ENCOUNTER — TELEPHONE (OUTPATIENT)
Dept: PULMONOLOGY | Age: 59
End: 2024-11-14

## 2024-11-14 DIAGNOSIS — J45.40 MODERATE PERSISTENT ASTHMA WITHOUT COMPLICATION: Primary | ICD-10-CM

## 2024-11-14 NOTE — TELEPHONE ENCOUNTER
Patient called and needs prescription for a spacer sent to pharmacy    [id number] [place] [place] [address] - P [phone] - F [phone]     PH: [phone]

## 2024-11-22 ENCOUNTER — TELEPHONE (OUTPATIENT)
Dept: PULMONOLOGY | Age: 59
End: 2024-11-22

## 2024-11-22 RX ORDER — PREDNISONE 5 MG/1
5 TABLET ORAL DAILY
COMMUNITY
Start: 2024-11-22 | End: 2024-11-26

## 2024-11-22 NOTE — TELEPHONE ENCOUNTER
Prednisone 5 mg #5 phoned into [facility] pharmacy/ [facility] [place].

## 2024-11-22 NOTE — TELEPHONE ENCOUNTER
Spoke with patient. She was recently in the hospital.  She states she took her last dose of Prednisone yesterday (5mg).  She started on 11/10/24 with 40mg.  She is requesting a few more days of the 5mg because she is getting a headache from tapering too fast.  She denies any pulmonary issues- cough, shortness of breath, wheezing.  She is afraid she will end up back in the hospital with a headache from tapering the prednisone too fast.

## 2024-11-22 NOTE — TELEPHONE ENCOUNTER
Patient left  and said when she was in the hospital she was given a nebulizer solution budesonide and needs a refill on medication said she will run out of on Monday. Also wanted to talk about her prednisone.      [id number] [place] [facility] [address] - P [phone] - F [phone]       PH: [phone]

## 2024-12-03 ENCOUNTER — OFFICE VISIT (OUTPATIENT)
Dept: PULMONOLOGY | Age: 59
End: 2024-12-03
Payer: COMMERCIAL

## 2024-12-03 VITALS
WEIGHT: 154.2 LBS | SYSTOLIC BLOOD PRESSURE: 124 MMHG | HEART RATE: 66 BPM | HEIGHT: 65 IN | BODY MASS INDEX: 25.69 KG/M2 | OXYGEN SATURATION: 98 % | DIASTOLIC BLOOD PRESSURE: 76 MMHG

## 2024-12-03 DIAGNOSIS — G47.33 OSA (OBSTRUCTIVE SLEEP APNEA): Primary | ICD-10-CM

## 2024-12-03 DIAGNOSIS — J96.11 CHRONIC HYPOXEMIC RESPIRATORY FAILURE: ICD-10-CM

## 2024-12-03 DIAGNOSIS — J45.40 MODERATE PERSISTENT ASTHMA WITHOUT COMPLICATION: ICD-10-CM

## 2024-12-03 DIAGNOSIS — J93.83 SPONTANEOUS PNEUMOTHORAX: ICD-10-CM

## 2024-12-03 PROCEDURE — 99214 OFFICE O/P EST MOD 30 MIN: CPT | Performed by: INTERNAL MEDICINE

## 2024-12-03 NOTE — PROGRESS NOTES
methylprednisolone in Nov 2024    Pulmicort [Budesonide] Other (See Comments)     Per pt - difficulty breathing/asthma attack per patient  Pt tolerating in Nov 2024 (pt convinced to trial by Pulmonologist)      Sulfa Antibiotics Hives    Sulfacetamide Sodium Hives     Breathing problems     Prior to Visit Medications    Medication Sig Taking? Authorizing Provider   Spacer/Aero-Holding Chambers SUSAN 1 Device by Does not apply route daily Yes Humble Hendrix MD   budesonide (PULMICORT) 0.5 MG/2ML nebulizer suspension Take 4 mLs by nebulization in the morning and 4 mLs in the evening. Yes Sid Wallis MD   tiotropium-olodaterol (STIOLTO) 2.5-2.5 MCG/ACT AERS Inhale 2 puffs into the lungs daily Yes Sid Wallis MD   lidocaine 4 % external patch Place 1 patch onto the skin daily Yes Sid Wallis MD   levalbuterol (XOPENEX HFA) 45 MCG/ACT inhaler Inhale 2 puffs into the lungs every 6 hours as needed for Shortness of Breath Yes Juice Mendez MD   albuterol (PROVENTIL) (5 MG/ML) 0.5% nebulizer solution Take 1 mL by nebulization 4 times daily as needed for Wheezing Yes Tai Meier MD   fexofenadine (ALLEGRA) 180 MG tablet Take 1 tablet by mouth daily Yes Fred Duque MD   ZOLMitriptan (ZOMIG) 5 MG tablet Take 1 tablet by mouth as needed for Migraine Yes Karen Chavez MD   acetaminophen (TYLENOL) 325 MG tablet Take 2 tablets by mouth as needed Yes ProviderFred MD       Vitals:    12/03/24 1606   BP: 124/76   Site: Left Upper Arm   Position: Sitting   Cuff Size: Medium Adult   Pulse: 66   SpO2: 98%   Weight: 69.9 kg (154 lb 3.2 oz)   Height: 1.651 m (5' 5\")     Body mass index is 25.66 kg/m².     Wt Readings from Last 3 Encounters:   12/03/24 69.9 kg (154 lb 3.2 oz)   11/10/24 72.8 kg (160 lb 7.9 oz)   11/05/24 76.2 kg (168 lb)     BP Readings from Last 3 Encounters:   12/03/24 124/76   11/10/24 137/81   11/06/24 (!) 79/54        Social History     Tobacco Use   Smoking

## 2024-12-12 ENCOUNTER — TELEPHONE (OUTPATIENT)
Dept: SLEEP CENTER | Age: 59
End: 2024-12-12

## 2024-12-22 ENCOUNTER — HOSPITAL ENCOUNTER (OUTPATIENT)
Dept: SLEEP CENTER | Age: 59
Discharge: HOME | End: 2024-12-22
Payer: COMMERCIAL

## 2024-12-22 DIAGNOSIS — G47.33 OSA (OBSTRUCTIVE SLEEP APNEA): ICD-10-CM

## 2024-12-22 PROCEDURE — 95810 POLYSOM 6/> YRS 4/> PARAM: CPT

## 2024-12-31 ENCOUNTER — TELEPHONE (OUTPATIENT)
Dept: PULMONOLOGY | Age: 59
End: 2024-12-31

## 2025-01-15 ENCOUNTER — OFFICE VISIT (OUTPATIENT)
Dept: PULMONOLOGY | Age: 60
End: 2025-01-15
Payer: COMMERCIAL

## 2025-01-15 VITALS
HEIGHT: 65 IN | OXYGEN SATURATION: 96 % | SYSTOLIC BLOOD PRESSURE: 122 MMHG | BODY MASS INDEX: 27.02 KG/M2 | WEIGHT: 162.2 LBS | HEART RATE: 66 BPM | DIASTOLIC BLOOD PRESSURE: 74 MMHG

## 2025-01-15 DIAGNOSIS — J93.83 SPONTANEOUS PNEUMOTHORAX: ICD-10-CM

## 2025-01-15 DIAGNOSIS — Z87.891 FORMER SMOKER: ICD-10-CM

## 2025-01-15 DIAGNOSIS — J96.11 CHRONIC HYPOXEMIC RESPIRATORY FAILURE: Primary | ICD-10-CM

## 2025-01-15 DIAGNOSIS — J45.40 MODERATE PERSISTENT ASTHMA WITHOUT COMPLICATION: ICD-10-CM

## 2025-01-15 PROCEDURE — 99214 OFFICE O/P EST MOD 30 MIN: CPT | Performed by: INTERNAL MEDICINE

## 2025-01-15 RX ORDER — LEVALBUTEROL TARTRATE 45 UG/1
2 AEROSOL, METERED ORAL EVERY 6 HOURS PRN
Qty: 45 G | Refills: 5 | Status: SHIPPED | OUTPATIENT
Start: 2025-01-15

## 2025-01-15 NOTE — PROGRESS NOTES
[facility]  Progress Note  Humble Hendrix MD       Shannon Zeng   YOB: 1965    Date of Visit:  1/15/2025    Assessment/Plan:  1. Chronic hypoxemic respiratory failure  She was sent home from the hospital with supplemental oxygen.  Her PSG did not show significant desaturation during sleep while breathing RA.      2. Moderate persistent asthma without complication  She has had difficulty in the past with some of the medications.    She is concerned that the medication may contribute to her current symptoms.  However, if she missed the Stiolto she can feel it in her breathing    Discussed options including changing her inhaled medication but ultimately we decided to keep everything the same for now    Pulmicort HHN once per day  Stiolto 2 puffs once per day  Xopenex as needed    3.  Spontaneous left pneumothorax  No supporting evidence for esophageal perforation.  I reviewed imaging from her hospitalization and her last chest x-ray after the tube was removed looks normal.  She would carry an increased risk for recurrence and I made her aware of that today.    4.  Obstructive sleep apnea     Media Information            Document Information    Other Order: Sleep Lab Result   Baseline Sleep Study Report 12/22/24 12/20/2024 07:58   Attached To:   Baseline Diagnostic Sleep Study [[id number]]   Hospital Encounter on 12/22/24 with [facility] SLEEP LAB ROOM 2   Source Information    Soraya Meeks  [facility]   Document History      I reviewed sleep study with the patient today  No evidence of significant GIGI or O2 desaturation    FOLLOW UP: 3 months      Chief Complaint   Patient presents with    1 Month Follow-Up     Moderate persistent asthma without complication  GIGI         HPI  The patient presents today for follow-up after recent hospitalization related to spontaneous pneumothorax.  She presented to [facility] with shortness of

## 2025-02-21 ENCOUNTER — TELEPHONE (OUTPATIENT)
Dept: PULMONOLOGY | Age: 60
End: 2025-02-21

## 2025-02-21 RX ORDER — AZITHROMYCIN 250 MG/1
TABLET, FILM COATED ORAL
Qty: 6 TABLET | Refills: 0 | Status: SHIPPED | OUTPATIENT
Start: 2025-02-21 | End: 2025-03-03

## 2025-02-21 NOTE — TELEPHONE ENCOUNTER
Patient just got out of the hospital and refuses to go to the ER.  Patient is having wheezing, eyes are blurred.  Patient would like an antibiotic called in?     Symptom Duration- 2 day  Cough(Productive)- no  Chest Congestion-no  Headache-no  Fever-no  Covid test, Flu or RSV taken- no  Sore Throat-no  Shortness of breath- yes  Wheezing- yes  Are you on Oxygen-yes  How Many Liters-2 liters  O2 stat-exertion 87, sitting 91   Body Aches-no  Fatigue- a little  Any Over the counter meds tried-no     Pharmacy-  [facility] [address]

## 2025-02-24 ENCOUNTER — TELEPHONE (OUTPATIENT)
Dept: PULMONOLOGY | Age: 60
End: 2025-02-24

## 2025-02-24 ENCOUNTER — HOSPITAL ENCOUNTER (OUTPATIENT)
Age: 60
Discharge: HOME | End: 2025-02-24
Payer: COMMERCIAL

## 2025-02-24 ENCOUNTER — HOSPITAL ENCOUNTER (OUTPATIENT)
Dept: GENERAL RADIOLOGY | Age: 60
Discharge: HOME | End: 2025-02-24
Payer: COMMERCIAL

## 2025-02-24 ENCOUNTER — OFFICE VISIT (OUTPATIENT)
Dept: PULMONOLOGY | Age: 60
End: 2025-02-24
Payer: COMMERCIAL

## 2025-02-24 VITALS
OXYGEN SATURATION: 96 % | DIASTOLIC BLOOD PRESSURE: 80 MMHG | BODY MASS INDEX: 27.02 KG/M2 | WEIGHT: 162.2 LBS | HEART RATE: 82 BPM | HEIGHT: 65 IN | SYSTOLIC BLOOD PRESSURE: 134 MMHG

## 2025-02-24 DIAGNOSIS — J96.11 CHRONIC HYPOXEMIC RESPIRATORY FAILURE (HCC): ICD-10-CM

## 2025-02-24 DIAGNOSIS — J20.9 MODERATE PERSISTENT ASTHMA WITH ACUTE BRONCHITIS AND ACUTE EXACERBATION: Primary | ICD-10-CM

## 2025-02-24 DIAGNOSIS — J45.40 MODERATE PERSISTENT ASTHMA WITHOUT COMPLICATION: Primary | ICD-10-CM

## 2025-02-24 DIAGNOSIS — J45.41 MODERATE PERSISTENT ASTHMA WITH ACUTE BRONCHITIS AND ACUTE EXACERBATION: Primary | ICD-10-CM

## 2025-02-24 DIAGNOSIS — J45.41 MODERATE PERSISTENT ASTHMA WITH ACUTE BRONCHITIS AND ACUTE EXACERBATION: ICD-10-CM

## 2025-02-24 DIAGNOSIS — J20.9 MODERATE PERSISTENT ASTHMA WITH ACUTE BRONCHITIS AND ACUTE EXACERBATION: ICD-10-CM

## 2025-02-24 DIAGNOSIS — Z87.891 FORMER SMOKER: ICD-10-CM

## 2025-02-24 PROCEDURE — 99214 OFFICE O/P EST MOD 30 MIN: CPT | Performed by: INTERNAL MEDICINE

## 2025-02-24 PROCEDURE — 71046 X-RAY EXAM CHEST 2 VIEWS: CPT

## 2025-02-24 RX ORDER — ALBUTEROL SULFATE 5 MG/ML
5 SOLUTION RESPIRATORY (INHALATION) 4 TIMES DAILY PRN
Qty: 120 EACH | Refills: 3 | Status: SHIPPED | OUTPATIENT
Start: 2025-02-24 | End: 2025-02-24

## 2025-02-24 RX ORDER — PREDNISONE 10 MG/1
TABLET ORAL
Qty: 30 TABLET | Refills: 0 | Status: SHIPPED | OUTPATIENT
Start: 2025-02-24 | End: 2025-03-06

## 2025-02-24 RX ORDER — DOXYCYCLINE 100 MG/1
100 CAPSULE ORAL 2 TIMES DAILY
Qty: 20 CAPSULE | Refills: 0 | Status: SHIPPED | OUTPATIENT
Start: 2025-02-24 | End: 2025-03-06

## 2025-02-24 RX ORDER — BUDESONIDE 0.5 MG/2ML
1 INHALANT ORAL
Qty: 60 EACH | Refills: 11 | Status: SHIPPED | OUTPATIENT
Start: 2025-02-24

## 2025-02-24 NOTE — TELEPHONE ENCOUNTER
Pt needs refills on:    albuterol (PROVENTIL) (5 MG/ML) 0.5% nebulizer solution     Send to:    [address] [place] [address] - P [phone] - F [phone] [address] [facility] [address]  Phone: [phone]  Fax: [phone]

## 2025-02-24 NOTE — PROGRESS NOTES
Pulmicort, Stiolto and Xopenex.    Review of Systems  As documented in HPI     Physical Exam:  Well developed, well nourished  Alert and oriented  Sclera is clear  No cervical adenopathy  No JVD.  Chest examination is wheezing  Cardiac examination reveals regular rate and rhythm without murmur, gallop or rub.  The abdomen is soft, nontender and nondistended.   There is no clubbing, cyanosis or edema of the extremities.  There is no obvious skin rash.  No focal neuro deficicts  Normal mood and affect    Allergies   Allergen Reactions    Breo Ellipta [Fluticasone Furoate-Vilanterol] Shortness Of Breath and Other (See Comments)     Difficulty breathing  Patient tolerated IV methylprednisolone in Nov 2024    Penicillins Anaphylaxis    Avelox [Moxifloxacin]     Cephalosporins     Clindamycin/Lincomycin      Stomach pain      Codeine Itching    Levaquin [Levofloxacin In D5w]      Joint pains      Lincomycin Hcl      Stomach pain    Prednisone Other (See Comments)     Swelling head hurts neause  Skin hurts  Pt tolerated IV methylprednisolone in Nov 2024    Pulmicort [Budesonide] Other (See Comments)     Per pt - difficulty breathing/asthma attack per patient  Pt tolerating in Nov 2024 (pt convinced to trial by Pulmonologist)      Sulfa Antibiotics Hives    Sulfacetamide Sodium Hives     Breathing problems     Prior to Visit Medications    Medication Sig Taking? Authorizing Provider   albuterol (PROVENTIL) (5 MG/ML) 0.5% nebulizer solution Take 1 mL by nebulization 4 times daily as needed for Wheezing Yes Humble Hendrix MD   azithromycin (ZITHROMAX) 250 MG tablet 500mg on day 1 followed by 250mg on days 2 - 5 Yes Juice Mendez MD   levalbuterol (XOPENEX HFA) 45 MCG/ACT inhaler Inhale 2 puffs into the lungs every 6 hours as needed for Shortness of Breath Yes Humble Hendrix MD   tiotropium-olodaterol (STIOLTO) 2.5-2.5 MCG/ACT AERS Inhale 2 puffs into the lungs daily Yes Humble Hendrix MD   Spacer/Aero-Holding

## 2025-02-25 ENCOUNTER — TELEPHONE (OUTPATIENT)
Dept: PULMONOLOGY | Age: 60
End: 2025-02-25

## 2025-02-25 DIAGNOSIS — J45.40 MODERATE PERSISTENT ASTHMA WITHOUT COMPLICATION: ICD-10-CM

## 2025-02-25 RX ORDER — LEVALBUTEROL TARTRATE 45 UG/1
2 AEROSOL, METERED ORAL EVERY 6 HOURS PRN
Qty: 45 G | Refills: 5 | Status: SHIPPED | OUTPATIENT
Start: 2025-02-25

## 2025-02-25 NOTE — TELEPHONE ENCOUNTER
Pt called and is still needing refills on Levalbuterol nebulizer solution.    Send to:    [address] - [address] [place] [address] - P [phone] - F [phone] [address] [facility] [address]  Phone: [phone]  Fax: [phone]

## 2025-02-26 DIAGNOSIS — J45.40 MODERATE PERSISTENT ASTHMA WITHOUT COMPLICATION: Primary | ICD-10-CM

## 2025-02-26 RX ORDER — BUDESONIDE 0.5 MG/2ML
1 INHALANT ORAL
Qty: 60 EACH | Refills: 11 | Status: CANCELLED | OUTPATIENT
Start: 2025-02-26

## 2025-02-27 ENCOUNTER — TELEPHONE (OUTPATIENT)
Dept: PULMONOLOGY | Age: 60
End: 2025-02-27

## 2025-02-27 RX ORDER — LEVALBUTEROL INHALATION SOLUTION 0.63 MG/3ML
1 SOLUTION RESPIRATORY (INHALATION) EVERY 4 HOURS PRN
COMMUNITY

## 2025-02-27 NOTE — TELEPHONE ENCOUNTER
Patient called very upset because Xopenex inhaler was called in instead of Xopenex neb solution.  S/W Primitivo at [facility] and called in Xopenex neb solution, 0.63 mg, 3 refills, take every 4 hrs as neeed.

## 2025-04-16 ENCOUNTER — OFFICE VISIT (OUTPATIENT)
Dept: PULMONOLOGY | Age: 60
End: 2025-04-16
Payer: COMMERCIAL

## 2025-04-16 VITALS
SYSTOLIC BLOOD PRESSURE: 126 MMHG | BODY MASS INDEX: 27.63 KG/M2 | WEIGHT: 165.8 LBS | OXYGEN SATURATION: 92 % | HEART RATE: 74 BPM | DIASTOLIC BLOOD PRESSURE: 70 MMHG | HEIGHT: 65 IN

## 2025-04-16 DIAGNOSIS — J45.40 MODERATE PERSISTENT ASTHMA WITHOUT COMPLICATION: Primary | ICD-10-CM

## 2025-04-16 PROCEDURE — 99214 OFFICE O/P EST MOD 30 MIN: CPT | Performed by: INTERNAL MEDICINE

## 2025-04-16 NOTE — PROGRESS NOTES
[facility]  Progress Note  Humble Hendrix MD       Shannon Zeng   YOB: 1965    Date of Visit:  4/16/2025    Assessment/Plan:  1. Chronic hypoxemic respiratory failure  No longer on supplemental O2      2. Moderate persistent asthma with exacerbation  She has had difficulty in the past with some of the medications.    She is concerned that the medication may contribute to her current symptoms.  However, if she missed the Stiolto she can feel it in her breathing    Discussed options including changing her inhaled medication but ultimately we decided to keep everything the same for now    Pulmicort HHN once per day ==> She is not taking this as all forms of steroids give her problems  Singulair gave her ear infections  Stiolto 2 puffs once per day ==> doesn't help much.   Xopenex as needed ==> not as good as Proventil but that is no longer available.  She is not willing to consider injectables at this time.     Plan:  Continue Stiolto  Try to take the Pulmicort Tues and Friday in hopes of benefiting from the anti-inflammatory effect without having the limiting side effects.      3.  Spontaneous left pneumothorax  No clinical recurrence    4.  Obstructive sleep apnea     Media Information            I reviewed sleep study with the patient today  No evidence of significant GIGI or O2 desaturation    FOLLOW UP: 3 months      Chief Complaint   Patient presents with    3 Month Follow-Up     Moderate persistent asthma with acute bronchitis and acute exacerbation  Chronic hypoxemic respiratory failure         HPI  The patient presents today acutely with chest congestion and cough. She has wheezing and her chest is tight. She has been sick about 4-5 days. She got a Z pack but that does not seem to be helping.  She is not having fever or chills. She does take Pulmicort, Stiolto and Xopenex.    Review of Systems  As documented in HPI     Physical Exam:  Well

## 2025-05-19 ENCOUNTER — TELEPHONE (OUTPATIENT)
Dept: PULMONOLOGY | Age: 60
End: 2025-05-19

## 2025-05-19 NOTE — TELEPHONE ENCOUNTER
Pt called and needs letter sent to [facility] for discontinue of O2.  Per her last office visit on 4/16/25 the note says \"No longer on supplemental O2\"

## 2025-05-19 NOTE — TELEPHONE ENCOUNTER
Patient called saying on her last visit 4/16 the no said, \"No longer on supplemental O2.\"  I did not see anything that said D/C O2.  Patient wants the O2 .  Should O let [facility] know to D/C O2?

## 2025-07-23 ENCOUNTER — TELEPHONE (OUTPATIENT)
Dept: PULMONOLOGY | Age: 60
End: 2025-07-23

## 2025-07-23 RX ORDER — DOXYCYCLINE 100 MG/1
100 CAPSULE ORAL 2 TIMES DAILY
Qty: 20 CAPSULE | Refills: 0 | Status: SHIPPED | OUTPATIENT
Start: 2025-07-23 | End: 2025-08-02

## 2025-07-23 NOTE — TELEPHONE ENCOUNTER
Pt called stating she was exposed to a respiratory infection a couple days ago and is now experiencing a tight chest, sob, dry cough and wheezing, she has been using her rescue inhaler a lot more. she is asking for an antibiotic to be sent into the pharmacy

## 2025-07-23 NOTE — TELEPHONE ENCOUNTER
Patient said a few days ago she was exposed to a respiratory infection.  She is having symptoms of chest tightness, SOB, cough and wheezing.  Patient is using her rescue inhaler more now.  She would like to know if an antibiotic could be called in her pharmacy.

## 2025-07-24 DIAGNOSIS — J45.40 MODERATE PERSISTENT ASTHMA WITHOUT COMPLICATION: Primary | ICD-10-CM

## 2025-07-25 RX ORDER — TIOTROPIUM BROMIDE AND OLODATEROL 3.124; 2.736 UG/1; UG/1
SPRAY, METERED RESPIRATORY (INHALATION)
Qty: 4 G | Refills: 0 | Status: SHIPPED | OUTPATIENT
Start: 2025-07-25

## 2025-07-25 NOTE — TELEPHONE ENCOUNTER
Last appointment:  4/16/2025    Next appointment:  Visit date not found    Last refill: [unfilled]